# Patient Record
Sex: MALE | Race: BLACK OR AFRICAN AMERICAN | NOT HISPANIC OR LATINO | Employment: FULL TIME | URBAN - METROPOLITAN AREA
[De-identification: names, ages, dates, MRNs, and addresses within clinical notes are randomized per-mention and may not be internally consistent; named-entity substitution may affect disease eponyms.]

---

## 2022-01-10 ENCOUNTER — OFFICE VISIT (OUTPATIENT)
Dept: FAMILY MEDICINE CLINIC | Facility: CLINIC | Age: 37
End: 2022-01-10
Payer: COMMERCIAL

## 2022-01-10 DIAGNOSIS — B34.9 VIRAL SYNDROME: Primary | ICD-10-CM

## 2022-01-10 PROCEDURE — U0003 INFECTIOUS AGENT DETECTION BY NUCLEIC ACID (DNA OR RNA); SEVERE ACUTE RESPIRATORY SYNDROME CORONAVIRUS 2 (SARS-COV-2) (CORONAVIRUS DISEASE [COVID-19]), AMPLIFIED PROBE TECHNIQUE, MAKING USE OF HIGH THROUGHPUT TECHNOLOGIES AS DESCRIBED BY CMS-2020-01-R: HCPCS | Performed by: FAMILY MEDICINE

## 2022-01-10 PROCEDURE — 99203 OFFICE O/P NEW LOW 30 MIN: CPT | Performed by: FAMILY MEDICINE

## 2022-01-10 PROCEDURE — U0005 INFEC AGEN DETEC AMPLI PROBE: HCPCS | Performed by: FAMILY MEDICINE

## 2022-01-10 NOTE — PROGRESS NOTES
Assessment/Plan:  Patient will rest and increase fluids  Patient have COVID testing done at this time  Self quarantine  Note to return to work Saturday       Diagnoses and all orders for this visit:    Viral syndrome            Subjective:        Patient ID: Maura Haines is a 39 y o  male  Patient is here as a new patient to establish care  Past medical history surgical history allergies medications family history and social history all reviewed present time  Patient is here with fever since Wednesday  The fever has improved  Patient with backache /achiness  This is improved  Patient had some diarrhea  This is improved  Patient also had cough  Patient with some URI symptoms  No medications use  Patient did use NyQuil as well as TheraFlu  Patient will need COVID testing for work  The following portions of the patient's history were reviewed and updated as appropriate: allergies, current medications, past family history, past medical history, past social history, past surgical history and problem list       Review of Systems   Constitutional: Negative  HENT: Positive for congestion, postnasal drip, rhinorrhea, sinus pressure and sinus pain  Eyes: Negative  Respiratory: Positive for cough  Cardiovascular: Negative  Gastrointestinal: Negative  Endocrine: Negative  Genitourinary: Negative  Musculoskeletal: Negative  Skin: Negative  Allergic/Immunologic: Negative  Neurological: Negative  Hematological: Negative  Psychiatric/Behavioral: Negative  Objective: There were no vitals taken for this visit  Physical Exam  Vitals and nursing note reviewed  Constitutional:       General: He is not in acute distress  Appearance: Normal appearance  He is not ill-appearing, toxic-appearing or diaphoretic  HENT:      Head: Normocephalic and atraumatic        Right Ear: Tympanic membrane, ear canal and external ear normal  There is no impacted cerumen  Left Ear: Tympanic membrane, ear canal and external ear normal  There is no impacted cerumen  Nose: Rhinorrhea present  No congestion  Mouth/Throat:      Mouth: Mucous membranes are moist       Pharynx: Oropharyngeal exudate present  No posterior oropharyngeal erythema  Eyes:      General: No scleral icterus  Right eye: No discharge  Left eye: No discharge  Extraocular Movements: Extraocular movements intact  Conjunctiva/sclera: Conjunctivae normal       Pupils: Pupils are equal, round, and reactive to light  Neck:      Vascular: No carotid bruit  Cardiovascular:      Rate and Rhythm: Normal rate and regular rhythm  Pulses: Normal pulses  Heart sounds: Normal heart sounds  No murmur heard  No friction rub  No gallop  Pulmonary:      Effort: Pulmonary effort is normal  No respiratory distress  Breath sounds: Normal breath sounds  No stridor  No wheezing, rhonchi or rales  Chest:      Chest wall: No tenderness  Abdominal:      General: Abdomen is flat  Bowel sounds are normal  There is no distension  Palpations: Abdomen is soft  Tenderness: There is no abdominal tenderness  There is no guarding or rebound  Musculoskeletal:         General: No swelling, tenderness, deformity or signs of injury  Normal range of motion  Cervical back: Normal range of motion and neck supple  No rigidity  No muscular tenderness  Right lower leg: No edema  Left lower leg: No edema  Lymphadenopathy:      Cervical: No cervical adenopathy  Skin:     General: Skin is warm and dry  Capillary Refill: Capillary refill takes less than 2 seconds  Coloration: Skin is not jaundiced  Findings: No bruising, erythema, lesion or rash  Neurological:      General: No focal deficit present  Mental Status: He is alert and oriented to person, place, and time  Cranial Nerves: No cranial nerve deficit        Sensory: No sensory deficit  Motor: No weakness  Coordination: Coordination normal       Gait: Gait normal    Psychiatric:         Mood and Affect: Mood normal          Behavior: Behavior normal          Thought Content:  Thought content normal          Judgment: Judgment normal

## 2022-01-10 NOTE — LETTER
January 10, 2022     Patient: Willow Ontiveros   YOB: 1985   Date of Visit: 1/10/2022       To Whom it May Concern:    Willow Ontiveros is under my professional care  He was seen in my office on 1/10/2022  He may return to work on 01/15/2022  If you have any questions or concerns, please don't hesitate to call           Sincerely,          Joseph Marroquin DO        CC: No Recipients

## 2022-01-11 LAB — SARS-COV-2 RNA RESP QL NAA+PROBE: POSITIVE

## 2022-08-15 ENCOUNTER — DOCUMENTATION (OUTPATIENT)
Dept: FAMILY MEDICINE CLINIC | Facility: CLINIC | Age: 37
End: 2022-08-15

## 2022-08-15 ENCOUNTER — OFFICE VISIT (OUTPATIENT)
Dept: FAMILY MEDICINE CLINIC | Facility: CLINIC | Age: 37
End: 2022-08-15
Payer: COMMERCIAL

## 2022-08-15 VITALS
WEIGHT: 131.8 LBS | BODY MASS INDEX: 19.52 KG/M2 | DIASTOLIC BLOOD PRESSURE: 82 MMHG | HEIGHT: 69 IN | TEMPERATURE: 98.9 F | SYSTOLIC BLOOD PRESSURE: 120 MMHG

## 2022-08-15 DIAGNOSIS — H60.332 ACUTE SWIMMER'S EAR OF LEFT SIDE: Primary | ICD-10-CM

## 2022-08-15 DIAGNOSIS — H66.002 NON-RECURRENT ACUTE SUPPURATIVE OTITIS MEDIA OF LEFT EAR WITHOUT SPONTANEOUS RUPTURE OF TYMPANIC MEMBRANE: ICD-10-CM

## 2022-08-15 PROCEDURE — 99213 OFFICE O/P EST LOW 20 MIN: CPT | Performed by: FAMILY MEDICINE

## 2022-08-15 RX ORDER — NEOMYCIN SULFATE, POLYMYXIN B SULFATE AND HYDROCORTISONE 10; 3.5; 1 MG/ML; MG/ML; [USP'U]/ML
4 SUSPENSION/ DROPS AURICULAR (OTIC) 4 TIMES DAILY
Qty: 10 ML | Refills: 0 | Status: SHIPPED | OUTPATIENT
Start: 2022-08-15

## 2022-08-15 RX ORDER — AMOXICILLIN 500 MG/1
1000 CAPSULE ORAL EVERY 12 HOURS SCHEDULED
Qty: 28 CAPSULE | Refills: 0 | Status: SHIPPED | OUTPATIENT
Start: 2022-08-15 | End: 2022-08-22

## 2022-08-15 RX ORDER — IBUPROFEN 600 MG/1
600 TABLET ORAL EVERY 6 HOURS PRN
Qty: 90 TABLET | Refills: 0 | Status: SHIPPED | OUTPATIENT
Start: 2022-08-15

## 2022-08-15 NOTE — PROGRESS NOTES
Assessment/Plan: note for work given at this time  Patient have modified duty for the next week       Diagnoses and all orders for this visit:    Acute swimmer's ear of left side  -     neomycin-polymyxin-hydrocortisone (CORTISPORIN) 0 35%-10,000 units/mL-1% otic suspension; Administer 4 drops into the left ear 4 (four) times a day  -     amoxicillin (AMOXIL) 500 mg capsule; Take 2 capsules (1,000 mg total) by mouth every 12 (twelve) hours for 7 days  -     ibuprofen (MOTRIN) 600 mg tablet; Take 1 tablet (600 mg total) by mouth every 6 (six) hours as needed for mild pain or moderate pain    Non-recurrent acute suppurative otitis media of left ear without spontaneous rupture of tympanic membrane  -     amoxicillin (AMOXIL) 500 mg capsule; Take 2 capsules (1,000 mg total) by mouth every 12 (twelve) hours for 7 days  -     ibuprofen (MOTRIN) 600 mg tablet; Take 1 tablet (600 mg total) by mouth every 6 (six) hours as needed for mild pain or moderate pain            Subjective:        Patient ID: Lisa Mazariegos is a 39 y o  male  Patient is here with left ear swelling and pain over the past 2 days  No fever noted  No other URI symptoms noted with this  Patient is using Motrin with some relief  The following portions of the patient's history were reviewed and updated as appropriate: allergies, current medications, past family history, past medical history, past social history, past surgical history and problem list       Review of Systems   Constitutional: Negative  HENT: Positive for ear pain  Eyes: Negative  Respiratory: Negative  Cardiovascular: Negative  Gastrointestinal: Negative  Endocrine: Negative  Genitourinary: Negative  Musculoskeletal: Negative  Skin: Negative  Allergic/Immunologic: Negative  Neurological: Negative  Hematological: Negative  Psychiatric/Behavioral: Negative              Objective:               /82 (BP Location: Right arm, Patient Position: Sitting, Cuff Size: Standard)   Temp 98 9 °F (37 2 °C) (Temporal)   Ht 5' 9" (1 753 m) Comment: Per patient  Wt 59 8 kg (131 lb 12 8 oz)   BMI 19 46 kg/m²          Physical Exam  Vitals and nursing note reviewed  Constitutional:       General: He is not in acute distress  Appearance: Normal appearance  He is not ill-appearing, toxic-appearing or diaphoretic  HENT:      Head: Normocephalic and atraumatic  Right Ear: Tympanic membrane, ear canal and external ear normal  There is no impacted cerumen  Left Ear: External ear normal  There is no impacted cerumen  Ears:      Comments: Left ear canal with debris /discharge  Nose: Nose normal  No congestion or rhinorrhea  Mouth/Throat:      Mouth: Mucous membranes are moist       Pharynx: No oropharyngeal exudate or posterior oropharyngeal erythema  Eyes:      General: No scleral icterus  Right eye: No discharge  Left eye: No discharge  Extraocular Movements: Extraocular movements intact  Conjunctiva/sclera: Conjunctivae normal       Pupils: Pupils are equal, round, and reactive to light  Neck:      Vascular: No carotid bruit  Cardiovascular:      Rate and Rhythm: Normal rate and regular rhythm  Pulses: Normal pulses  Heart sounds: Normal heart sounds  No murmur heard  No friction rub  No gallop  Pulmonary:      Effort: Pulmonary effort is normal  No respiratory distress  Breath sounds: Normal breath sounds  No stridor  No wheezing, rhonchi or rales  Chest:      Chest wall: No tenderness  Musculoskeletal:         General: No swelling, tenderness, deformity or signs of injury  Normal range of motion  Cervical back: Normal range of motion and neck supple  No rigidity  No muscular tenderness  Right lower leg: No edema  Left lower leg: No edema  Lymphadenopathy:      Cervical: No cervical adenopathy  Skin:     General: Skin is warm and dry        Capillary Refill: Capillary refill takes less than 2 seconds  Coloration: Skin is not jaundiced  Findings: No bruising, erythema, lesion or rash  Neurological:      Mental Status: He is alert and oriented to person, place, and time  Mental status is at baseline  Cranial Nerves: No cranial nerve deficit  Sensory: No sensory deficit  Motor: No weakness  Coordination: Coordination normal       Gait: Gait normal    Psychiatric:         Mood and Affect: Mood normal          Behavior: Behavior normal          Thought Content:  Thought content normal          Judgment: Judgment normal

## 2022-08-26 ENCOUNTER — OFFICE VISIT (OUTPATIENT)
Dept: FAMILY MEDICINE CLINIC | Facility: CLINIC | Age: 37
End: 2022-08-26
Payer: COMMERCIAL

## 2022-08-26 VITALS
TEMPERATURE: 97.1 F | SYSTOLIC BLOOD PRESSURE: 110 MMHG | DIASTOLIC BLOOD PRESSURE: 74 MMHG | HEART RATE: 61 BPM | BODY MASS INDEX: 19.4 KG/M2 | WEIGHT: 131 LBS | HEIGHT: 69 IN | RESPIRATION RATE: 18 BRPM | OXYGEN SATURATION: 98 %

## 2022-08-26 DIAGNOSIS — H60.332 ACUTE SWIMMER'S EAR OF LEFT SIDE: ICD-10-CM

## 2022-08-26 DIAGNOSIS — H66.002 NON-RECURRENT ACUTE SUPPURATIVE OTITIS MEDIA OF LEFT EAR WITHOUT SPONTANEOUS RUPTURE OF TYMPANIC MEMBRANE: Primary | ICD-10-CM

## 2022-08-26 PROCEDURE — 99213 OFFICE O/P EST LOW 20 MIN: CPT | Performed by: FAMILY MEDICINE

## 2022-08-26 RX ORDER — LEVOFLOXACIN 500 MG/1
500 TABLET, FILM COATED ORAL EVERY 24 HOURS
Qty: 7 TABLET | Refills: 0 | Status: SHIPPED | OUTPATIENT
Start: 2022-08-26 | End: 2022-09-02

## 2022-08-26 NOTE — PROGRESS NOTES
Assessment/Plan:  Patient will try to refrain from getting water in ear  Diagnoses and all orders for this visit:    Non-recurrent acute suppurative otitis media of left ear without spontaneous rupture of tympanic membrane  -     levofloxacin (LEVAQUIN) 500 mg tablet; Take 1 tablet (500 mg total) by mouth every 24 hours for 7 days    Acute swimmer's ear of left side  -     levofloxacin (LEVAQUIN) 500 mg tablet; Take 1 tablet (500 mg total) by mouth every 24 hours for 7 days            Subjective:        Patient ID: Albert Merchant is a 39 y o  male  Patient follow-up on left otitis media as well as some reserve  Patient has seen improvement with treatment but has noticed slight recurrence at this time  No fever noted  Scant discharge noted  No URI symptoms otherwise  The following portions of the patient's history were reviewed and updated as appropriate: allergies, current medications, past family history, past medical history, past social history, past surgical history and problem list       Review of Systems   Constitutional: Negative  HENT: Positive for ear pain  Eyes: Negative  Respiratory: Negative  Cardiovascular: Negative  Gastrointestinal: Negative  Endocrine: Negative  Genitourinary: Negative  Musculoskeletal: Negative  Skin: Negative  Allergic/Immunologic: Negative  Neurological: Negative  Hematological: Negative  Psychiatric/Behavioral: Negative  Objective:        Depression Screening and Follow-up Plan: Patient was screened for depression during today's encounter  They screened negative with a PHQ-2 score of 0             /74 (BP Location: Right arm, Patient Position: Sitting, Cuff Size: Adult)   Pulse 61   Temp (!) 97 1 °F (36 2 °C) (Tympanic)   Resp 18   Ht 5' 9" (1 753 m)   Wt 59 4 kg (131 lb)   SpO2 98%   BMI 19 35 kg/m²          Physical Exam  Vitals and nursing note reviewed     Constitutional:       General: He is not in acute distress  Appearance: Normal appearance  He is not ill-appearing, toxic-appearing or diaphoretic  HENT:      Head: Normocephalic and atraumatic  Right Ear: Tympanic membrane, ear canal and external ear normal       Left Ear: Tympanic membrane normal       Ears:      Comments: White discharge left external auditory canal   Neurological:      Mental Status: He is alert

## 2022-10-13 ENCOUNTER — OFFICE VISIT (OUTPATIENT)
Dept: FAMILY MEDICINE CLINIC | Facility: CLINIC | Age: 37
End: 2022-10-13
Payer: COMMERCIAL

## 2022-10-13 VITALS
SYSTOLIC BLOOD PRESSURE: 110 MMHG | HEART RATE: 65 BPM | DIASTOLIC BLOOD PRESSURE: 68 MMHG | BODY MASS INDEX: 19.55 KG/M2 | OXYGEN SATURATION: 98 % | TEMPERATURE: 98.7 F | WEIGHT: 132 LBS | HEIGHT: 69 IN

## 2022-10-13 DIAGNOSIS — Z23 ENCOUNTER FOR IMMUNIZATION: ICD-10-CM

## 2022-10-13 DIAGNOSIS — Z00.00 WELL ADULT EXAM: Primary | ICD-10-CM

## 2022-10-13 PROCEDURE — 90715 TDAP VACCINE 7 YRS/> IM: CPT

## 2022-10-13 PROCEDURE — 99395 PREV VISIT EST AGE 18-39: CPT | Performed by: FAMILY MEDICINE

## 2022-10-13 PROCEDURE — 90471 IMMUNIZATION ADMIN: CPT

## 2022-10-13 NOTE — PROGRESS NOTES
Assessment/Plan:  Adacel shot given at this time  Some other vaccines reviewed  No early family history of colon or prostate cancer  Patient will consider laboratory studies for the future  Follow-up in 1 year  Recommend exercise and appropriate diet well       Diagnoses and all orders for this visit:    Well adult exam            Subjective:        Patient ID: Kaleigh Allred is a 39 y o  male  Patient is here for wellness exam   Labs and vaccines reviewed  No early family history of colon or prostate cancer  Patient feeling fairly well overall  The following portions of the patient's history were reviewed and updated as appropriate: allergies, current medications, past family history, past medical history, past social history, past surgical history and problem list       Review of Systems   Constitutional: Negative  HENT: Negative  Eyes: Negative  Respiratory: Negative  Cardiovascular: Negative  Gastrointestinal: Negative  Endocrine: Negative  Genitourinary: Negative  Musculoskeletal: Negative  Skin: Negative  Allergic/Immunologic: Negative  Neurological: Negative  Hematological: Negative  Psychiatric/Behavioral: Negative  Objective:               /68 (BP Location: Left arm, Patient Position: Sitting, Cuff Size: Standard)   Pulse 65   Temp 98 7 °F (37 1 °C) (Temporal)   Ht 5' 9" (1 753 m)   Wt 59 9 kg (132 lb)   SpO2 98%   BMI 19 49 kg/m²          Physical Exam  Vitals and nursing note reviewed  Constitutional:       General: He is not in acute distress  Appearance: Normal appearance  He is not ill-appearing, toxic-appearing or diaphoretic  HENT:      Head: Normocephalic and atraumatic  Right Ear: Tympanic membrane, ear canal and external ear normal  There is no impacted cerumen  Left Ear: Tympanic membrane, ear canal and external ear normal  There is no impacted cerumen        Nose: Nose normal  No congestion or rhinorrhea  Mouth/Throat:      Mouth: Mucous membranes are moist       Pharynx: No oropharyngeal exudate or posterior oropharyngeal erythema  Eyes:      General: No scleral icterus  Right eye: No discharge  Left eye: No discharge  Extraocular Movements: Extraocular movements intact  Conjunctiva/sclera: Conjunctivae normal       Pupils: Pupils are equal, round, and reactive to light  Neck:      Vascular: No carotid bruit  Cardiovascular:      Rate and Rhythm: Normal rate and regular rhythm  Pulses: Normal pulses  Heart sounds: Normal heart sounds  No murmur heard  No friction rub  No gallop  Pulmonary:      Effort: Pulmonary effort is normal  No respiratory distress  Breath sounds: Normal breath sounds  No stridor  No wheezing, rhonchi or rales  Chest:      Chest wall: No tenderness  Abdominal:      General: Abdomen is flat  Bowel sounds are normal  There is no distension  Palpations: Abdomen is soft  Tenderness: There is no abdominal tenderness  There is no guarding or rebound  Musculoskeletal:         General: No swelling, tenderness, deformity or signs of injury  Normal range of motion  Cervical back: Normal range of motion and neck supple  No rigidity  No muscular tenderness  Right lower leg: No edema  Left lower leg: No edema  Lymphadenopathy:      Cervical: No cervical adenopathy  Skin:     General: Skin is warm and dry  Capillary Refill: Capillary refill takes less than 2 seconds  Coloration: Skin is not jaundiced  Findings: No bruising, erythema, lesion or rash  Neurological:      Mental Status: He is alert and oriented to person, place, and time  Mental status is at baseline  Cranial Nerves: No cranial nerve deficit  Sensory: No sensory deficit  Motor: No weakness        Coordination: Coordination normal       Gait: Gait normal    Psychiatric:         Mood and Affect: Mood normal  Behavior: Behavior normal          Thought Content:  Thought content normal          Judgment: Judgment normal

## 2022-10-14 PROBLEM — H60.332 ACUTE SWIMMER'S EAR OF LEFT SIDE: Status: RESOLVED | Noted: 2022-08-15 | Resolved: 2022-10-14

## 2022-10-14 PROBLEM — H66.002 NON-RECURRENT ACUTE SUPPURATIVE OTITIS MEDIA OF LEFT EAR: Status: RESOLVED | Noted: 2022-08-15 | Resolved: 2022-10-14

## 2022-12-12 PROBLEM — Z00.00 WELL ADULT EXAM: Status: RESOLVED | Noted: 2022-10-13 | Resolved: 2022-12-12

## 2024-03-11 ENCOUNTER — DOCUMENTATION (OUTPATIENT)
Dept: FAMILY MEDICINE CLINIC | Facility: CLINIC | Age: 39
End: 2024-03-11

## 2024-07-18 ENCOUNTER — EVALUATION (OUTPATIENT)
Dept: PHYSICAL THERAPY | Facility: CLINIC | Age: 39
End: 2024-07-18
Payer: COMMERCIAL

## 2024-07-18 VITALS — DIASTOLIC BLOOD PRESSURE: 78 MMHG | SYSTOLIC BLOOD PRESSURE: 116 MMHG

## 2024-07-18 DIAGNOSIS — G89.29 NECK PAIN, CHRONIC: Primary | ICD-10-CM

## 2024-07-18 DIAGNOSIS — G89.29 CHRONIC LEFT-SIDED THORACIC BACK PAIN: ICD-10-CM

## 2024-07-18 DIAGNOSIS — M54.6 CHRONIC LEFT-SIDED THORACIC BACK PAIN: ICD-10-CM

## 2024-07-18 DIAGNOSIS — M54.2 NECK PAIN, CHRONIC: Primary | ICD-10-CM

## 2024-07-18 PROCEDURE — 97161 PT EVAL LOW COMPLEX 20 MIN: CPT

## 2024-07-18 PROCEDURE — 97112 NEUROMUSCULAR REEDUCATION: CPT
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
## 2024-07-22 ENCOUNTER — OFFICE VISIT (OUTPATIENT)
Dept: PHYSICAL THERAPY | Facility: CLINIC | Age: 39
End: 2024-07-22
Payer: COMMERCIAL

## 2024-07-22 DIAGNOSIS — G89.29 CHRONIC LEFT-SIDED THORACIC BACK PAIN: ICD-10-CM

## 2024-07-22 DIAGNOSIS — M54.2 NECK PAIN, CHRONIC: Primary | ICD-10-CM

## 2024-07-22 DIAGNOSIS — G89.29 NECK PAIN, CHRONIC: Primary | ICD-10-CM

## 2024-07-22 DIAGNOSIS — M54.6 CHRONIC LEFT-SIDED THORACIC BACK PAIN: ICD-10-CM

## 2024-07-22 PROCEDURE — 97110 THERAPEUTIC EXERCISES: CPT

## 2024-07-22 PROCEDURE — 97112 NEUROMUSCULAR REEDUCATION: CPT
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
## 2024-07-25 ENCOUNTER — OFFICE VISIT (OUTPATIENT)
Dept: PHYSICAL THERAPY | Facility: CLINIC | Age: 39
End: 2024-07-25
Payer: COMMERCIAL

## 2024-07-25 DIAGNOSIS — M54.6 CHRONIC LEFT-SIDED THORACIC BACK PAIN: ICD-10-CM

## 2024-07-25 DIAGNOSIS — M54.2 NECK PAIN, CHRONIC: Primary | ICD-10-CM

## 2024-07-25 DIAGNOSIS — G89.29 CHRONIC LEFT-SIDED THORACIC BACK PAIN: ICD-10-CM

## 2024-07-25 DIAGNOSIS — G89.29 NECK PAIN, CHRONIC: Primary | ICD-10-CM

## 2024-07-25 PROCEDURE — 97110 THERAPEUTIC EXERCISES: CPT

## 2024-07-25 PROCEDURE — 97112 NEUROMUSCULAR REEDUCATION: CPT
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
## 2024-07-29 ENCOUNTER — OFFICE VISIT (OUTPATIENT)
Dept: PHYSICAL THERAPY | Facility: CLINIC | Age: 39
End: 2024-07-29
Payer: COMMERCIAL

## 2024-07-29 DIAGNOSIS — G89.29 CHRONIC LEFT-SIDED THORACIC BACK PAIN: ICD-10-CM

## 2024-07-29 DIAGNOSIS — M54.6 CHRONIC LEFT-SIDED THORACIC BACK PAIN: ICD-10-CM

## 2024-07-29 DIAGNOSIS — M54.2 NECK PAIN, CHRONIC: Primary | ICD-10-CM

## 2024-07-29 DIAGNOSIS — G89.29 NECK PAIN, CHRONIC: Primary | ICD-10-CM

## 2024-07-29 PROCEDURE — 97112 NEUROMUSCULAR REEDUCATION: CPT

## 2024-07-29 PROCEDURE — 97110 THERAPEUTIC EXERCISES: CPT
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
## 2024-08-02 ENCOUNTER — OFFICE VISIT (OUTPATIENT)
Dept: PHYSICAL THERAPY | Facility: CLINIC | Age: 39
End: 2024-08-02
Payer: COMMERCIAL

## 2024-08-02 DIAGNOSIS — M54.6 CHRONIC LEFT-SIDED THORACIC BACK PAIN: ICD-10-CM

## 2024-08-02 DIAGNOSIS — G89.29 NECK PAIN, CHRONIC: Primary | ICD-10-CM

## 2024-08-02 DIAGNOSIS — M54.2 NECK PAIN, CHRONIC: Primary | ICD-10-CM

## 2024-08-02 DIAGNOSIS — G89.29 CHRONIC LEFT-SIDED THORACIC BACK PAIN: ICD-10-CM

## 2024-08-02 PROCEDURE — 97112 NEUROMUSCULAR REEDUCATION: CPT

## 2024-08-02 PROCEDURE — 97110 THERAPEUTIC EXERCISES: CPT

## 2024-08-02 PROCEDURE — 97530 THERAPEUTIC ACTIVITIES: CPT
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
## 2024-08-05 ENCOUNTER — OFFICE VISIT (OUTPATIENT)
Dept: PHYSICAL THERAPY | Facility: CLINIC | Age: 39
End: 2024-08-05
Payer: COMMERCIAL

## 2024-08-05 DIAGNOSIS — M54.6 CHRONIC LEFT-SIDED THORACIC BACK PAIN: ICD-10-CM

## 2024-08-05 DIAGNOSIS — G89.29 CHRONIC LEFT-SIDED THORACIC BACK PAIN: ICD-10-CM

## 2024-08-05 DIAGNOSIS — M54.2 NECK PAIN, CHRONIC: Primary | ICD-10-CM

## 2024-08-05 DIAGNOSIS — G89.29 NECK PAIN, CHRONIC: Primary | ICD-10-CM

## 2024-08-05 PROCEDURE — 97110 THERAPEUTIC EXERCISES: CPT
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
## 2024-08-09 ENCOUNTER — OFFICE VISIT (OUTPATIENT)
Dept: PHYSICAL THERAPY | Facility: CLINIC | Age: 39
End: 2024-08-09
Payer: COMMERCIAL

## 2024-08-09 DIAGNOSIS — M54.2 NECK PAIN, CHRONIC: Primary | ICD-10-CM

## 2024-08-09 DIAGNOSIS — G89.29 NECK PAIN, CHRONIC: Primary | ICD-10-CM

## 2024-08-09 DIAGNOSIS — M54.6 CHRONIC LEFT-SIDED THORACIC BACK PAIN: ICD-10-CM

## 2024-08-09 DIAGNOSIS — G89.29 CHRONIC LEFT-SIDED THORACIC BACK PAIN: ICD-10-CM

## 2024-08-09 PROCEDURE — 97112 NEUROMUSCULAR REEDUCATION: CPT

## 2024-08-09 PROCEDURE — 97535 SELF CARE MNGMENT TRAINING: CPT

## 2024-08-09 PROCEDURE — 97110 THERAPEUTIC EXERCISES: CPT
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
## 2024-08-12 ENCOUNTER — OFFICE VISIT (OUTPATIENT)
Dept: PHYSICAL THERAPY | Facility: CLINIC | Age: 39
End: 2024-08-12
Payer: COMMERCIAL

## 2024-08-12 DIAGNOSIS — G89.29 NECK PAIN, CHRONIC: Primary | ICD-10-CM

## 2024-08-12 DIAGNOSIS — M54.2 NECK PAIN, CHRONIC: Primary | ICD-10-CM

## 2024-08-12 DIAGNOSIS — G89.29 CHRONIC LEFT-SIDED THORACIC BACK PAIN: ICD-10-CM

## 2024-08-12 DIAGNOSIS — M54.6 CHRONIC LEFT-SIDED THORACIC BACK PAIN: ICD-10-CM

## 2024-08-12 PROCEDURE — 97535 SELF CARE MNGMENT TRAINING: CPT

## 2024-08-12 PROCEDURE — 97112 NEUROMUSCULAR REEDUCATION: CPT

## 2024-08-12 PROCEDURE — 97110 THERAPEUTIC EXERCISES: CPT
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
## 2024-08-16 ENCOUNTER — OFFICE VISIT (OUTPATIENT)
Dept: PHYSICAL THERAPY | Facility: CLINIC | Age: 39
End: 2024-08-16
Payer: COMMERCIAL

## 2024-08-16 DIAGNOSIS — G89.29 NECK PAIN, CHRONIC: Primary | ICD-10-CM

## 2024-08-16 DIAGNOSIS — M54.6 CHRONIC LEFT-SIDED THORACIC BACK PAIN: ICD-10-CM

## 2024-08-16 DIAGNOSIS — M54.2 NECK PAIN, CHRONIC: Primary | ICD-10-CM

## 2024-08-16 DIAGNOSIS — G89.29 CHRONIC LEFT-SIDED THORACIC BACK PAIN: ICD-10-CM

## 2024-08-16 PROCEDURE — 97535 SELF CARE MNGMENT TRAINING: CPT

## 2024-08-16 PROCEDURE — 97112 NEUROMUSCULAR REEDUCATION: CPT

## 2024-08-16 PROCEDURE — 97110 THERAPEUTIC EXERCISES: CPT

## 2024-08-23 ENCOUNTER — OFFICE VISIT (OUTPATIENT)
Dept: PHYSICAL THERAPY | Facility: CLINIC | Age: 39
End: 2024-08-23
Payer: COMMERCIAL

## 2024-08-23 DIAGNOSIS — G89.29 CHRONIC LEFT-SIDED THORACIC BACK PAIN: ICD-10-CM

## 2024-08-23 DIAGNOSIS — M54.6 CHRONIC LEFT-SIDED THORACIC BACK PAIN: ICD-10-CM

## 2024-08-23 DIAGNOSIS — M54.2 NECK PAIN, CHRONIC: Primary | ICD-10-CM

## 2024-08-23 DIAGNOSIS — G89.29 NECK PAIN, CHRONIC: Primary | ICD-10-CM

## 2024-08-23 PROCEDURE — 97112 NEUROMUSCULAR REEDUCATION: CPT

## 2024-08-23 PROCEDURE — 97110 THERAPEUTIC EXERCISES: CPT
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
## 2024-08-30 ENCOUNTER — OFFICE VISIT (OUTPATIENT)
Dept: PHYSICAL THERAPY | Facility: CLINIC | Age: 39
End: 2024-08-30
Payer: COMMERCIAL

## 2024-08-30 DIAGNOSIS — M54.2 NECK PAIN, CHRONIC: Primary | ICD-10-CM

## 2024-08-30 DIAGNOSIS — G89.29 NECK PAIN, CHRONIC: Primary | ICD-10-CM

## 2024-08-30 DIAGNOSIS — G89.29 CHRONIC LEFT-SIDED THORACIC BACK PAIN: ICD-10-CM

## 2024-08-30 DIAGNOSIS — M54.6 CHRONIC LEFT-SIDED THORACIC BACK PAIN: ICD-10-CM

## 2024-08-30 PROCEDURE — 97112 NEUROMUSCULAR REEDUCATION: CPT

## 2024-08-30 PROCEDURE — 97110 THERAPEUTIC EXERCISES: CPT

## 2024-08-30 PROCEDURE — 97140 MANUAL THERAPY 1/> REGIONS: CPT
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
## 2024-09-04 ENCOUNTER — OFFICE VISIT (OUTPATIENT)
Dept: PHYSICAL THERAPY | Facility: CLINIC | Age: 39
End: 2024-09-04
Payer: COMMERCIAL

## 2024-09-04 DIAGNOSIS — M54.2 NECK PAIN, CHRONIC: Primary | ICD-10-CM

## 2024-09-04 DIAGNOSIS — M54.6 CHRONIC LEFT-SIDED THORACIC BACK PAIN: ICD-10-CM

## 2024-09-04 DIAGNOSIS — G89.29 CHRONIC LEFT-SIDED THORACIC BACK PAIN: ICD-10-CM

## 2024-09-04 DIAGNOSIS — G89.29 NECK PAIN, CHRONIC: Primary | ICD-10-CM

## 2024-09-04 PROCEDURE — 97140 MANUAL THERAPY 1/> REGIONS: CPT

## 2024-09-04 PROCEDURE — 97112 NEUROMUSCULAR REEDUCATION: CPT

## 2024-09-04 PROCEDURE — 97110 THERAPEUTIC EXERCISES: CPT
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
## 2024-09-06 ENCOUNTER — OFFICE VISIT (OUTPATIENT)
Dept: PHYSICAL THERAPY | Facility: CLINIC | Age: 39
End: 2024-09-06
Payer: COMMERCIAL

## 2024-09-06 DIAGNOSIS — M54.6 CHRONIC LEFT-SIDED THORACIC BACK PAIN: ICD-10-CM

## 2024-09-06 DIAGNOSIS — G89.29 NECK PAIN, CHRONIC: Primary | ICD-10-CM

## 2024-09-06 DIAGNOSIS — G89.29 CHRONIC LEFT-SIDED THORACIC BACK PAIN: ICD-10-CM

## 2024-09-06 DIAGNOSIS — M54.2 NECK PAIN, CHRONIC: Primary | ICD-10-CM

## 2024-09-06 PROCEDURE — 97110 THERAPEUTIC EXERCISES: CPT

## 2024-09-06 PROCEDURE — 97140 MANUAL THERAPY 1/> REGIONS: CPT
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
## 2024-09-09 ENCOUNTER — APPOINTMENT (OUTPATIENT)
Dept: PHYSICAL THERAPY | Facility: CLINIC | Age: 39
End: 2024-09-09
Payer: COMMERCIAL
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
## 2024-09-13 ENCOUNTER — OFFICE VISIT (OUTPATIENT)
Dept: PHYSICAL THERAPY | Facility: CLINIC | Age: 39
End: 2024-09-13
Payer: COMMERCIAL

## 2024-09-13 DIAGNOSIS — G89.29 CHRONIC LEFT-SIDED THORACIC BACK PAIN: ICD-10-CM

## 2024-09-13 DIAGNOSIS — G89.29 NECK PAIN, CHRONIC: Primary | ICD-10-CM

## 2024-09-13 DIAGNOSIS — M54.6 CHRONIC LEFT-SIDED THORACIC BACK PAIN: ICD-10-CM

## 2024-09-13 DIAGNOSIS — M54.2 NECK PAIN, CHRONIC: Primary | ICD-10-CM

## 2024-09-13 PROCEDURE — 97140 MANUAL THERAPY 1/> REGIONS: CPT

## 2024-09-13 PROCEDURE — 97110 THERAPEUTIC EXERCISES: CPT

## 2024-09-13 PROCEDURE — 97164 PT RE-EVAL EST PLAN CARE: CPT | Performed by: PHYSICAL THERAPIST
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
## 2024-09-16 ENCOUNTER — APPOINTMENT (OUTPATIENT)
Dept: PHYSICAL THERAPY | Facility: CLINIC | Age: 39
End: 2024-09-16
Payer: COMMERCIAL
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
## 2024-09-20 ENCOUNTER — OFFICE VISIT (OUTPATIENT)
Dept: PHYSICAL THERAPY | Facility: CLINIC | Age: 39
End: 2024-09-20
Payer: COMMERCIAL

## 2024-09-20 DIAGNOSIS — M54.2 NECK PAIN, CHRONIC: Primary | ICD-10-CM

## 2024-09-20 DIAGNOSIS — G89.29 CHRONIC LEFT-SIDED THORACIC BACK PAIN: ICD-10-CM

## 2024-09-20 DIAGNOSIS — G89.29 NECK PAIN, CHRONIC: Primary | ICD-10-CM

## 2024-09-20 DIAGNOSIS — M54.6 CHRONIC LEFT-SIDED THORACIC BACK PAIN: ICD-10-CM

## 2024-09-20 PROCEDURE — 97140 MANUAL THERAPY 1/> REGIONS: CPT

## 2024-09-20 PROCEDURE — 97112 NEUROMUSCULAR REEDUCATION: CPT

## 2024-09-20 PROCEDURE — 97110 THERAPEUTIC EXERCISES: CPT
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
## 2024-09-25 ENCOUNTER — OFFICE VISIT (OUTPATIENT)
Dept: PHYSICAL THERAPY | Facility: CLINIC | Age: 39
End: 2024-09-25
Payer: COMMERCIAL

## 2024-09-25 DIAGNOSIS — M54.6 CHRONIC LEFT-SIDED THORACIC BACK PAIN: ICD-10-CM

## 2024-09-25 DIAGNOSIS — M54.2 NECK PAIN, CHRONIC: Primary | ICD-10-CM

## 2024-09-25 DIAGNOSIS — G89.29 CHRONIC LEFT-SIDED THORACIC BACK PAIN: ICD-10-CM

## 2024-09-25 DIAGNOSIS — G89.29 NECK PAIN, CHRONIC: Primary | ICD-10-CM

## 2024-09-25 PROCEDURE — 97110 THERAPEUTIC EXERCISES: CPT

## 2024-09-25 PROCEDURE — 97112 NEUROMUSCULAR REEDUCATION: CPT
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
## 2024-09-27 ENCOUNTER — OFFICE VISIT (OUTPATIENT)
Dept: PHYSICAL THERAPY | Facility: CLINIC | Age: 39
End: 2024-09-27
Payer: COMMERCIAL

## 2024-09-27 DIAGNOSIS — G89.29 NECK PAIN, CHRONIC: Primary | ICD-10-CM

## 2024-09-27 DIAGNOSIS — M54.6 CHRONIC LEFT-SIDED THORACIC BACK PAIN: ICD-10-CM

## 2024-09-27 DIAGNOSIS — M54.2 NECK PAIN, CHRONIC: Primary | ICD-10-CM

## 2024-09-27 DIAGNOSIS — G89.29 CHRONIC LEFT-SIDED THORACIC BACK PAIN: ICD-10-CM

## 2024-09-27 PROCEDURE — 97110 THERAPEUTIC EXERCISES: CPT

## 2024-09-27 PROCEDURE — 97112 NEUROMUSCULAR REEDUCATION: CPT

## 2024-09-27 PROCEDURE — 97140 MANUAL THERAPY 1/> REGIONS: CPT
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
## 2024-09-30 ENCOUNTER — OFFICE VISIT (OUTPATIENT)
Dept: PHYSICAL THERAPY | Facility: CLINIC | Age: 39
End: 2024-09-30
Payer: COMMERCIAL

## 2024-09-30 DIAGNOSIS — G89.29 NECK PAIN, CHRONIC: Primary | ICD-10-CM

## 2024-09-30 DIAGNOSIS — M54.6 CHRONIC LEFT-SIDED THORACIC BACK PAIN: ICD-10-CM

## 2024-09-30 DIAGNOSIS — G89.29 CHRONIC LEFT-SIDED THORACIC BACK PAIN: ICD-10-CM

## 2024-09-30 DIAGNOSIS — M54.2 NECK PAIN, CHRONIC: Primary | ICD-10-CM

## 2024-09-30 PROCEDURE — 97140 MANUAL THERAPY 1/> REGIONS: CPT

## 2024-09-30 PROCEDURE — 97110 THERAPEUTIC EXERCISES: CPT

## 2024-09-30 PROCEDURE — 97112 NEUROMUSCULAR REEDUCATION: CPT
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
## 2024-10-04 ENCOUNTER — OFFICE VISIT (OUTPATIENT)
Dept: PHYSICAL THERAPY | Facility: CLINIC | Age: 39
End: 2024-10-04
Payer: COMMERCIAL

## 2024-10-04 DIAGNOSIS — M54.2 NECK PAIN, CHRONIC: Primary | ICD-10-CM

## 2024-10-04 DIAGNOSIS — G89.29 NECK PAIN, CHRONIC: Primary | ICD-10-CM

## 2024-10-04 DIAGNOSIS — M54.6 CHRONIC LEFT-SIDED THORACIC BACK PAIN: ICD-10-CM

## 2024-10-04 DIAGNOSIS — G89.29 CHRONIC LEFT-SIDED THORACIC BACK PAIN: ICD-10-CM

## 2024-10-04 PROCEDURE — 97112 NEUROMUSCULAR REEDUCATION: CPT

## 2024-10-04 PROCEDURE — 97140 MANUAL THERAPY 1/> REGIONS: CPT

## 2024-10-04 PROCEDURE — 97010 HOT OR COLD PACKS THERAPY: CPT

## 2024-10-04 PROCEDURE — 97110 THERAPEUTIC EXERCISES: CPT
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
## 2024-10-07 ENCOUNTER — OFFICE VISIT (OUTPATIENT)
Dept: PHYSICAL THERAPY | Facility: CLINIC | Age: 39
End: 2024-10-07
Payer: COMMERCIAL

## 2024-10-07 DIAGNOSIS — G89.29 NECK PAIN, CHRONIC: Primary | ICD-10-CM

## 2024-10-07 DIAGNOSIS — M54.2 NECK PAIN, CHRONIC: Primary | ICD-10-CM

## 2024-10-07 DIAGNOSIS — G89.29 CHRONIC LEFT-SIDED THORACIC BACK PAIN: ICD-10-CM

## 2024-10-07 DIAGNOSIS — M54.6 CHRONIC LEFT-SIDED THORACIC BACK PAIN: ICD-10-CM

## 2024-10-07 PROCEDURE — 97112 NEUROMUSCULAR REEDUCATION: CPT

## 2024-10-07 PROCEDURE — 97110 THERAPEUTIC EXERCISES: CPT

## 2024-10-07 PROCEDURE — 97530 THERAPEUTIC ACTIVITIES: CPT
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
## 2024-10-11 ENCOUNTER — APPOINTMENT (OUTPATIENT)
Dept: PHYSICAL THERAPY | Facility: CLINIC | Age: 39
End: 2024-10-11
Payer: COMMERCIAL

## 2024-10-14 ENCOUNTER — APPOINTMENT (OUTPATIENT)
Dept: PHYSICAL THERAPY | Facility: CLINIC | Age: 39
End: 2024-10-14
Payer: COMMERCIAL

## 2024-10-14 NOTE — PROGRESS NOTES
PT Re-Evaluation     Today's date: 2024  Patient name: Elvira Grant  : 1985  MRN: [id number]  Referring provider: Koki Conrad,*  Dx:   Encounter Diagnosis     ICD-10-CM    1. Neck pain, chronic  M54.2     G89.29       2. Chronic left-sided thoracic back pain  M54.6     G89.29           Assessment  Assessment details:  Elvira has attended 2 months of PT for neck pain adding in vestibular dx as per physician. Tolerated session without adverse effects. Recommend continued skilled therapy to improve overall strength and mobility for functional return with decreased compensation and pain.       Elvira has attended 14 visits of physical therapy and demonstrates improvements in cervical ROM and UE strength leading to improved function with lifting and carrying, but continues to have high pain levels as well as scapular dyskinesis with hypertonicity in surrounding scapular muscles, as well as thoracic hypomobility continuing to affect his function with lifting, working, sleeping, exercising, prolonged sitting or standing, and reaching/lifting overhead.  Pt will benefit from continued skilled care to meet set goals and address functional impairments.       Goals  Pt will demonstrate Loving with HEP to promote PT carry over and improve ability to manage symptoms independently once Discharged - met  Pt will demonstrate 20% improvement in FOTO score to increase ease with all ADLs and functional activities per PLOF.   Pt will demonstrate 10 deg improvement in cervical AROM to increase tolerance to turning head when driving, and looking down when reading. - met  Pt will demonstrate 4+/5 in bilateral Ue/periscap strength to increase tolerance to lifting overhead and improve prolonged sitting   Pt will demonstrate ability to perform repeated overhead lifting at least 5Ibs without pain and no signs of compensation, or poor scapular movement to improve ease with putting away items in overhead shelves  at work with min to no difficulty.   Pt will demonstrate completing proper pushing motion to improve tolerance to pushing boxes, opening doors etc. Without discomfort to improve tolerance to work activities.     Plan  Patient would benefit from: PT eval and skilled physical therapy    Frequency: 2x week  Plan of Care beginning date: 10/14/2024  Plan of Care expiration date: 2024  Treatment plan discussed with: patient        Subjective Evaluation    History of Present Illness  10/14/24:      : Pt reports improved function, but increased pain and symptoms with continued numbness and tingling in his left arm.  He continues to have some difficulty sleeping.  His neck motion is improving but he still has pain turning.  He continues at work with some pain in his shoulder lifting objects and totes 5-15lbs.      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   Hypotonic in the levator scapulae.   Tenderness of the cervical paraspinals, levator scapulae, lower trapezius, middle trapezius and upper trapezius.   Trigger point to cervical paraspinals, levator scapulae, lower trapezius, middle trapezius and upper trapezius.     Right   Hypertonic in the serratus anterior.   Tenderness of the levator scapulae, serratus anterior and teres minor.   Trigger point to levator scapulae,  teres minor and upper trapezius.     Tenderness     Left Shoulder   No tenderness     Right Shoulder  Tenderness in the lateral scapula and medial scapula and subscapularis. No tenderness in the acromion, biceps tendon (proximal) and bicipital groove.       Active Range of Motion   Cervical  Subcranial protraction:  WFL   Subcranial retraction: minimal  Flexion:  WFL w pain  Extension:  WFL w pain  Left lateral flexion:  WFL with pain  Right lateral flexion:  WFL   Left rotation:  with pain Restriction level: minimal  Right rotation:  with pain Restriction level: minimal  Left Shoulder   Normal active range of motion    Right Shoulder   Normal active range of motion    Additional Active Range of Motion Details  Extension compensation excessive upper cervical extension , lower cervical extension     Scapular Mobility     Right Shoulder   Scapular Dyskinesis: grade I  Scapular mobility: good  Scapular Mobility with Shoulder to 90° FF   Scapular winging: minimal  Scapular elevation: minimal  Upward rotation: premature  Downward rotation: excessive    Scapular Mobility beyond 90° FF   Scapular winging: minimal  Upward rotation: premature  Downward rotation: excessive    Joint Play   Joints within functional limits: C4, C5, C6, C7 and T1     Hypomobile: C1, C2 and C3, T2-T7    Strength/Myotome Testing   Cervical Spine     Left   Interossei strength (t1): 4+    Right   Interossei strength (t1): 4+    Left Shoulder     Planes of Motion   Flexion: 5  Abduction: 5   External rotation at 0°: 4  Internal rotation at 0°: 4     Isolated Muscles   Lower trapezius: 4   Middle trapezius: 4   Serratus anterior: 4    Right Shoulder     Planes of Motion   Flexion: 5   Abduction: 5   External rotation at 0°: 5   Internal rotation at 0°: 5     Isolated Muscles   Lower trapezius: 4+   Middle trapezius: 4+   Serratus anterior: 4+     Left Elbow   Flexion: 5  Extension: 5    Right Elbow   Flexion: 5  Extension: 5    Left Wrist/Hand   Wrist  "extension: 4+  Thumb extension: 4+    Right Wrist/Hand   Wrist extension: 4+  Thumb extension: 4+    Tests   Cervical   Negative Lhermitte's sign, alar ligament test, Sharp-Shane test, transverse ligament test, VBI, craniocervical flexion test  and neck flexor muscle endurance test.     Left   Negative Spurling's Test A, Spurling's Test B and cervical flexion-rotation test.     Right   Negative Spurling's Test A, Spurling's Test B and cervical flexion-rotation test.     Left Shoulder   Negative ULTT1.     Right Shoulder   Positive Hawkin's, painful arc, wall push, scapular relocation and scapular assistance .   Negative ULTT1.   Neuro Exam:     Headaches   Patient reports headaches: No.   Graphical documentation:      and       Flowsheet Rows      Flowsheet Row Most Recent Value   PT/OT G-Codes    Current Score 59   Projected Score 73           Date 8/30 9/4 9/6 9/13 9/20 9/25 9/27 9/30 10/4 10/7   Visit # 11 12 13 14 15 16 17 18 19 20   FOTO       X               Re-eval       SF                     Focus on NM control of Left scapula and posterior cervical musculature.      Manuals  9/4 9/6 9/13 9/20 9/26 9/27 9/30 10/4 10/7   T/S Mobe    PWK J-scoop SF PWK   PWK         UT/LS Str            PWK         Scapular Mob    PWK SF PWK   PWK HY PWK     R Shldr PROM and STM      SF PWK             CS gentle traction  PK PWK   PWK             SOR  PK PWK       PWK HY PWK     T spine STM    PWK   PWK   PWK HY PWK     Neuro Re-Ed  9/4 9/6 9/13 9/20 9/25 9/27 9/30 10/4 10/7   Push up plus                  Scap wall push ups  x 20    Serratus press   4# 30x punches supine         30x 2# punches 30x 2# punches       Doorway Pec Str            3x30\" 3x30\" 3x30\"      Openbooks        10x3\" ea 10x3\"            SNAG ext                      SNAG rot                       UT/LS Str                      Back         3' 3'           Lax Ball Rollout          3'           Deep Neck flex supine      NV 20x5\" 20x5\"  20x5\"  20x5\" " " 15x5\"      Prone neck ext   np                   Pain/postural ucation, dx edu                  KR   Ther Ex  9/4 9/6 9/13 9/20 9/25 9/27 9/30 10/4 10/7   UBE  3'/3' L=5 3'/3' L=5 3'/3' L=5 3'/3' 3'/3' 3'/3' 3'/3' 3'/3' 3'/3'    Foam roll protocol   np                   No moneys                  GTB x 20    T/S ext, rot  10x5\" seated ea 10x5\" seated ea             Pball stretch 10x10\" / thoracic rot stretch 5x10    Tband rows, ext  Purple band 3\"   x 30 ea Black TB 30x 3\" ea     30x 15# ea 20x 15# ea 20x 15# ea 20x 15# ea 15# x30 ea   B/l ER            20x 5# 20x 5# 20x 5#     B/l IR            20x 5# 20x 5# 20x 5#     D2 Flexion                      90/90 ER      2# 15x 20x 2# 20x            Quad Ts, Ys, Ws as able                      Prone I, T, Y  1# 15x 2ea c stablizatoin to decrease Lshrug  1# 15x ea c stabilizatiom to decrease Lshrugh T only 2# 15x ea c stabilizatiom to decrease Lshrugh T only 2# 15x ea c stabilizatiom to decrease Lshrugh T only   10x all directions 2# on SB 2x10 all directions 2# on SB 10x all directions 2# on SB     Repeated flex, scap with scap assist                      Shoulder SHurgs          20x 3\" 2# 20x 3\" 2# 20x 3\" 2# 20x 3\" 2#     Scap retraction          20x3\" 2# 20x3\" 2# 20x3\" 2# 20x3\" 2#     Ther Activity  9/4   9/13       9/30 10/4 10/7                                                 Gait Training  9/4   9/13       9/30 10/4 10/7                                                 Modalities  9/4   9/13       9/30 10/4 10/7   [facility]                8'                                    "

## 2024-10-18 ENCOUNTER — EVALUATION (OUTPATIENT)
Dept: PHYSICAL THERAPY | Facility: CLINIC | Age: 39
End: 2024-10-18
Payer: COMMERCIAL

## 2024-10-18 DIAGNOSIS — G89.29 NECK PAIN, CHRONIC: Primary | ICD-10-CM

## 2024-10-18 DIAGNOSIS — M54.6 CHRONIC LEFT-SIDED THORACIC BACK PAIN: ICD-10-CM

## 2024-10-18 DIAGNOSIS — M54.2 NECK PAIN, CHRONIC: Primary | ICD-10-CM

## 2024-10-18 DIAGNOSIS — G89.29 CHRONIC LEFT-SIDED THORACIC BACK PAIN: ICD-10-CM

## 2024-10-18 PROCEDURE — 97164 PT RE-EVAL EST PLAN CARE: CPT

## 2024-10-18 PROCEDURE — 97140 MANUAL THERAPY 1/> REGIONS: CPT

## 2024-10-18 PROCEDURE — 97112 NEUROMUSCULAR REEDUCATION: CPT
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
## 2024-10-22 ENCOUNTER — EVALUATION (OUTPATIENT)
Dept: PHYSICAL THERAPY | Facility: CLINIC | Age: 39
End: 2024-10-22
Payer: COMMERCIAL

## 2024-10-22 DIAGNOSIS — H83.2X3 VESTIBULAR HYPOFUNCTION OF BOTH EARS: Primary | ICD-10-CM

## 2024-10-22 PROCEDURE — 97140 MANUAL THERAPY 1/> REGIONS: CPT

## 2024-10-22 PROCEDURE — 97112 NEUROMUSCULAR REEDUCATION: CPT

## 2024-10-22 PROCEDURE — 97162 PT EVAL MOD COMPLEX 30 MIN: CPT
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
## 2024-10-28 ENCOUNTER — OFFICE VISIT (OUTPATIENT)
Dept: PHYSICAL THERAPY | Facility: CLINIC | Age: 39
End: 2024-10-28
Payer: COMMERCIAL

## 2024-10-28 DIAGNOSIS — H83.2X3 VESTIBULAR HYPOFUNCTION OF BOTH EARS: Primary | ICD-10-CM

## 2024-10-28 DIAGNOSIS — M54.6 CHRONIC LEFT-SIDED THORACIC BACK PAIN: ICD-10-CM

## 2024-10-28 DIAGNOSIS — G89.29 CHRONIC LEFT-SIDED THORACIC BACK PAIN: ICD-10-CM

## 2024-10-28 DIAGNOSIS — G89.29 NECK PAIN, CHRONIC: ICD-10-CM

## 2024-10-28 DIAGNOSIS — M54.2 NECK PAIN, CHRONIC: ICD-10-CM

## 2024-10-28 PROCEDURE — 97530 THERAPEUTIC ACTIVITIES: CPT

## 2024-10-28 PROCEDURE — 97140 MANUAL THERAPY 1/> REGIONS: CPT

## 2024-10-28 PROCEDURE — 97112 NEUROMUSCULAR REEDUCATION: CPT

## 2024-10-28 PROCEDURE — 97110 THERAPEUTIC EXERCISES: CPT

## 2024-10-28 NOTE — PROGRESS NOTES
"Daily Note     Today's date: 10/28/2024  Patient name: Elvira Grant  : 1985  MRN: [id number]  Referring provider: Enrique Colmenares MD  Dx:   Encounter Diagnosis     ICD-10-CM    1. Vestibular hypofunction of both ears  H83.2X3       2. Neck pain, chronic  M54.2     G89.29       3. Chronic left-sided thoracic back pain  M54.6     G89.29                      Subjective: \" I got an injection on Friday and my neck is feeling better- I haven't noticed much of a difference with the vestibular exercises- all my pain is concentrated in that shoulder blade area\"       Objective: See treatment diary below      Assessment: Elvira presents to therapy with neck/ shoulder and vestibular dx. Added MFD to the L periscap region noting improved mobility and reduced pain post release. Worked on VOR exercises noting continued presence of hypofunction- eyes fatigued post. Encouraged to regularly perform at home.  Symptoms increased with horizontal movement as compared to vertical movement.  Introduced balance exercises today noting increased difficulty maintaining balance with foam and head mobility. Tolerated session without adverse effects. Recommend continued skilled therapy to improve overall strength and mobility for functional return with decreased compensation and pain.        Plan: Continue per plan of care.      Precautions:         Date 10/18 10/22 10/28  9/20 9/25 9/27 9/30 10/4 10/7   Visit # 21 22 23  15 16 17 18 19 20   FOTO XXX            Re-eval XXX Eval               Focus on NM control of Left scapula and posterior cervical musculature.     Manuals 10/18 10/22 10/28  9/20 9/26 9/27 9/30 10/4 10/7   T/S Mobe     PWK  PWK      UT/LS Str PWK      PWK      Scapular Mob PWK    PWK  PWK HY PWK    R Shldr PROM and STM     PWK        Cupping    Scap on L           CS gentle traction     PWK        SOR PWK      PWK HY PWK    T spine STM PWK    PWK  PWK HY PWK    Vestibular Testing  PWK carole-hallpike, BBQ roll       " "    Neuro Re-Ed 10/18 10/22 10/28  9/20 9/25 9/27 9/30 10/4 10/7   Pt Education  PWK           VOR 1 horizontal  2x30\" 2x 15          VOR 1 Vertical  2x30\" 2x15           VOR 2 Horizontal   L eye pressure/ fatigue x 15          VOR 2 Vertical             SLS    W/rockerboard           Tandem Stance   EC foam 3 x 20\"           Balance Beam   Tandem walking on foam w/ HT HNx 5 laps           Biodex   nv          Thoracic ext    5\" x 10 over foam roll        Scap wall push ups  x 20    Serratus press        30x 2# punches 30x 2# punches      Doorway Pec Str       3x30\" 3x30\" 3x30\"     Openbooks     10x3\" ea 10x3\"        SNAG ext             SNAG rot              UT/LS Str             Back      3' 3'       Lax Ball Rollout      3'       Deep Neck flex supine     20x5\" 20x5\"  20x5\"  20x5\"  15x5\"     Foam roll protocol    X15 ea          Pain/postural ucation, dx edu, vestibular edu PWK         KR   Ther Ex 10/18 10/22 10/28  9/20 9/25 9/27 9/30 10/4 10/7   UBE 3'/3'  3'/3'   3'/3' 3'/3' 3'/3' 3'/3' 3'/3' 3'/3'    Scap stretch    MFD 5 x 10\"           No moneys          GTB x 20    T/S ext, rot   Pball stretch 10x10\" / thoracic rot stretch 5x10 w/ MFD       Pball stretch 10x10\" / thoracic rot stretch 5x10    Tband rows, ext      30x 15# ea 20x 15# ea 20x 15# ea 20x 15# ea 15# x30 ea   B/l ER       20x 5# 20x 5# 20x 5#    B/l IR       20x 5# 20x 5# 20x 5#    D2 Flexion             90/90 ER     20x 2# 20x        Quad Ts, Ys, Ws as able             Prone I, T, Y     2# 15x ea c stabilizatiom to decrease Lshrugh T only  10x all directions 2# on SB 2x10 all directions 2# on SB 10x all directions 2# on SB    Shoulder SHurgs      20x 3\" 2# 20x 3\" 2# 20x 3\" 2# 20x 3\" 2#    Scap retraction      20x3\" 2# 20x3\" 2# 20x3\" 2# 20x3\" 2#    Ther Activity 10/18 10/22 10/28     9/30 10/4 10/7                             Gait Training 10/18 10/22 10/28     9/30 10/4 10/7                             Modalities  10/22 10/28     9/30 " 10/4 10/7   [facility]         8'

## 2024-11-01 ENCOUNTER — OFFICE VISIT (OUTPATIENT)
Dept: PHYSICAL THERAPY | Facility: CLINIC | Age: 39
End: 2024-11-01
Payer: COMMERCIAL

## 2024-11-01 DIAGNOSIS — M54.2 NECK PAIN, CHRONIC: ICD-10-CM

## 2024-11-01 DIAGNOSIS — M54.6 CHRONIC LEFT-SIDED THORACIC BACK PAIN: ICD-10-CM

## 2024-11-01 DIAGNOSIS — G89.29 NECK PAIN, CHRONIC: ICD-10-CM

## 2024-11-01 DIAGNOSIS — H83.2X3 VESTIBULAR HYPOFUNCTION OF BOTH EARS: Primary | ICD-10-CM

## 2024-11-01 DIAGNOSIS — G89.29 CHRONIC LEFT-SIDED THORACIC BACK PAIN: ICD-10-CM

## 2024-11-01 PROCEDURE — 97112 NEUROMUSCULAR REEDUCATION: CPT

## 2024-11-01 PROCEDURE — 97110 THERAPEUTIC EXERCISES: CPT

## 2024-11-01 NOTE — PROGRESS NOTES
Daily Note     Today's date: 2024  Patient name: Elvira Grant  : 1985  MRN: [id number]  Referring provider: Enrique Colmenares MD  Dx:   Encounter Diagnosis     ICD-10-CM    1. Vestibular hypofunction of both ears  H83.2X3       2. Neck pain, chronic  M54.2     G89.29       3. Chronic left-sided thoracic back pain  M54.6     G89.29           Start Time: 0800  Stop Time: 09  Total time in clinic (min): 60 minutes    Subjective: Pt reports that he received an injection in his neck last Friday, which has helped him decrease his overall symptom intensity in his neck and head (headaches, fogginess, and dizziness), but noted it has intensified his shoulder symptoms.       Objective: See treatment diary below      Assessment: Pt tolerated treatment well. Pt had increased dizziness/fogginess with all VOR 1 both vertical and horizontal, VO2, and gaze stabilization. Pt needed moderate cueing for corrections on vestibular exercises, along with rest breaks after each set due to exacerbation of symptoms. Pt had increased difficulty with VO2 and large exacerbation of symptoms, hold exercises in upcoming visits until pt showed improved performance and tolerance with VO1. Added Biodex and progressed balance exercises from last session, which pt remains challenged with, especially with EC. Continue to work on balance and progress as he tolerates. Pt had increased symptom intensity and difficulty in prone ITY, likely due to not performing exercises in few weeks and increased overall shoulder pain today. Patient exhibited good technique with therapeutic exercises and would benefit from continued PT      Plan: Continue per plan of care.  Progress treatment as tolerated.       Precautions:         Date 10/18 10/22 10/28 11/1    9/30 10/4 10/7   Visit # 21 22 23 24    18 19 20   FOTO XXX            Re-eval XXX Eval               Focus on NM control of Left scapula and posterior cervical musculature.     Manuals 10/18  "10/22 10/28 11/1    9/30 10/4 10/7   T/S Mobe             UT/LS Str PWK            Scapular Mob PWK       HY PWK    R Shldr PROM and STM             Cupping    Scap on L           CS gentle traction             SOR PWK       HY PWK    T spine STM PWK       HY PWK    Vestibular Testing  PWK carole-hallpike, BBQ roll           Neuro Re-Ed 10/18 10/22 10/28 11/1    9/30 10/4 10/7   Pt Education  PWK           VOR 1 horizontal  2x30\" 2x 15 3x30\"         VOR 1 Vertical  2x30\" 2x15  3x30\"          VOR 2 Horizontal   L eye pressure/ fatigue x 15 3x15 very  Diffcult  (HOLD TILL VOR1 IMPROVE)         VOR 2 Vertical             Gaze stabilization    4 letters w/ roation 10x ea          SLS    W/rockerboard  3x30\" ea, EC         Tandem Stance   EC foam 3 x 20\"  3x30\" foam EC         Balance Beam   Tandem walking on foam w/ HT HNx 5 laps  3 laps EO, 2 laps EC         Biodex   nv 2x maze floor 6, 3' target trace lvl 6         Thoracic ext    5\" x 10 over foam roll        Scap wall push ups  x 20    Serratus press         30x 2# punches      Doorway Pec Str        3x30\" 3x30\"     Openbooks             SNAG ext             SNAG rot              UT/LS Str             Back              Lax Ball Rollout             Deep Neck flex supine        20x5\"  15x5\"     Foam roll protocol    X15 ea          Pain/postural ucation, dx edu, vestibular edu PWK         KR   Ther Ex 10/18 10/22 10/28 11/1    9/30 10/4 10/7   UBE 3'/3'  3'/3'  3'/3'    3'/3' 3'/3' 3'/3'    Scap stretch    MFD 5 x 10\"           No moneys          GTB x 20    T/S ext, rot   Pball stretch 10x10\" / thoracic rot stretch 5x10 w/ MFD 15x5\" ea over foam roller      Pball stretch 10x10\" / thoracic rot stretch 5x10    Tband rows, ext        20x 15# ea 20x 15# ea 15# x30 ea   B/l ER        20x 5# 20x 5#    B/l IR        20x 5# 20x 5#    D2 Flexion             90/90 ER             Quad Ts, Ys, Ws as able             Prone I, T, Y    20x ea 2# on SB    2x10 all directions 2# " "on SB 10x all directions 2# on SB    Shoulder SHurgs        20x 3\" 2# 20x 3\" 2#    Scap retraction        20x3\" 2# 20x3\" 2#    Ther Activity 10/18 10/22 10/28 11/1    9/30 10/4 10/7                             Gait Training 10/18 10/22 10/28 11/1    9/30 10/4 10/7                             Modalities  10/22 10/28 11/1    9/30 10/4 10/7   [facility]         8'                          "

## 2024-11-04 ENCOUNTER — OFFICE VISIT (OUTPATIENT)
Dept: PHYSICAL THERAPY | Facility: CLINIC | Age: 39
End: 2024-11-04
Payer: COMMERCIAL

## 2024-11-04 DIAGNOSIS — M54.6 CHRONIC LEFT-SIDED THORACIC BACK PAIN: ICD-10-CM

## 2024-11-04 DIAGNOSIS — G89.29 CHRONIC LEFT-SIDED THORACIC BACK PAIN: ICD-10-CM

## 2024-11-04 DIAGNOSIS — M54.2 NECK PAIN, CHRONIC: ICD-10-CM

## 2024-11-04 DIAGNOSIS — H83.2X3 VESTIBULAR HYPOFUNCTION OF BOTH EARS: Primary | ICD-10-CM

## 2024-11-04 DIAGNOSIS — G89.29 NECK PAIN, CHRONIC: ICD-10-CM

## 2024-11-04 PROCEDURE — 97140 MANUAL THERAPY 1/> REGIONS: CPT

## 2024-11-04 PROCEDURE — 97110 THERAPEUTIC EXERCISES: CPT

## 2024-11-04 PROCEDURE — 97010 HOT OR COLD PACKS THERAPY: CPT

## 2024-11-04 NOTE — PROGRESS NOTES
Daily Note     Today's date: 2024  Patient name: Elvira Grant  : 1985  MRN: [id number]  Referring provider: Enrique Colmenares MD  Dx:   Encounter Diagnosis     ICD-10-CM    1. Vestibular hypofunction of both ears  H83.2X3       2. Neck pain, chronic  M54.2     G89.29       3. Chronic left-sided thoracic back pain  M54.6     G89.29           Start Time: 1403          Subjective: Pt presents to the clinic with reports of increased pain in the cervicothoracic area of the spine. States the pain is where he received injections. States the dizziness has been better and not causing him any problems. Pt states he is not having a good day.    Objective: See treatment diary below      Assessment: Pt presents to the clinic with increased pain this date. Manual therapy was utilized this date to try and relax the musculature and all radicular symptoms. Pt tolerated c/s distraction well with no increase in symptoms. Pt experienced some relief of symptoms with manual therapy this date. Will resume regular activities/exercises during the next session. Pt ended session with hot pack to try and relieve symptoms. No excessive redness/irritation observed with removal of hot pack. Tolerated treatment fair. Patient demonstrated fatigue post treatment and would benefit from continued PT      Plan: Continue per plan of care.      Precautions:         Date 10/18 10/22 10/28 11/1 11/4   9/30 10/4 10/7   Visit # 21 22 23 24 25   18 19 20   FOTO XXX    nv        Re-eval XXX Eval               Focus on NM control of Left scapula and posterior cervical musculature.     Manuals 10/18 10/22 10/28 11/1 11/4   9/30 10/4 10/7   T/S Mobe             UT/LS Str PWK            Scapular Mob PWK       HY PWK    R Shldr PROM and STM             Cupping    Scap on L           CS gentle traction     NH 10 min        SOR PWK    NH 3 min   HY PWK    T spine STM PWK    NH   HY PWK    Vestibular Testing  PWK carole-hallpike, BBQ roll          "  Neuro Re-Ed 10/18 10/22 10/28 11/1 11/4   9/30 10/4 10/7   Pt Education  PWK           VOR 1 horizontal  2x30\" 2x 15 3x30\"         VOR 1 Vertical  2x30\" 2x15  3x30\"          VOR 2 Horizontal   L eye pressure/ fatigue x 15 3x15 very  Diffcult  (HOLD TILL VOR1 IMPROVE)         VOR 2 Vertical             Gaze stabilization    4 letters w/ roation 10x ea          SLS    W/rockerboard  3x30\" ea, EC         Tandem Stance   EC foam 3 x 20\"  3x30\" foam EC         Balance Beam   Tandem walking on foam w/ HT HNx 5 laps  3 laps EO, 2 laps EC         Biodex   nv 2x maze floor 6, 3' target trace lvl 6         Thoracic ext    5\" x 10 over foam roll        Scap wall push ups  x 20    Serratus press         30x 2# punches      Doorway Pec Str        3x30\" 3x30\"     Openbooks             SNAG ext             SNAG rot              UT/LS Str             Back              Lax Ball Rollout             Deep Neck flex supine        20x5\"  15x5\"     Foam roll protocol    X15 ea          Pain/postural ucation, dx edu, vestibular edu PWK         KR   Ther Ex 10/18 10/22 10/28 11/1 11/4   9/30 10/4 10/7   UBE 3'/3'  3'/3'  3'/3' 3'/3'   3'/3' 3'/3' 3'/3'    Scap stretch    MFD 5 x 10\"           No moneys          GTB x 20    T/S ext, rot   Pball stretch 10x10\" / thoracic rot stretch 5x10 w/ MFD 15x5\" ea over foam roller 15x5\" ea over foam roller     Pball stretch 10x10\" / thoracic rot stretch 5x10    Tband rows, ext        20x 15# ea 20x 15# ea 15# x30 ea   B/l ER        20x 5# 20x 5#    B/l IR        20x 5# 20x 5#    D2 Flexion             90/90 ER             Quad Ts, Ys, Ws as able             Prone I, T, Y    20x ea 2# on SB    2x10 all directions 2# on SB 10x all directions 2# on SB    Shoulder SHurgs        20x 3\" 2# 20x 3\" 2#    Scap retraction        20x3\" 2# 20x3\" 2#    Ther Activity 10/18 10/22 10/28 11/1    9/30 10/4 10/7                             Gait Training 10/18 10/22 10/28 11/1    9/30 10/4 10/7                     "         Modalities  10/22 10/28 11/1 11/4   9/30 10/4 10/7   [facility]     10'    8'

## 2024-11-08 ENCOUNTER — OFFICE VISIT (OUTPATIENT)
Dept: PHYSICAL THERAPY | Facility: CLINIC | Age: 39
End: 2024-11-08
Payer: COMMERCIAL

## 2024-11-08 DIAGNOSIS — M54.2 NECK PAIN, CHRONIC: ICD-10-CM

## 2024-11-08 DIAGNOSIS — G89.29 CHRONIC LEFT-SIDED THORACIC BACK PAIN: ICD-10-CM

## 2024-11-08 DIAGNOSIS — G89.29 NECK PAIN, CHRONIC: ICD-10-CM

## 2024-11-08 DIAGNOSIS — H83.2X3 VESTIBULAR HYPOFUNCTION OF BOTH EARS: Primary | ICD-10-CM

## 2024-11-08 DIAGNOSIS — M54.6 CHRONIC LEFT-SIDED THORACIC BACK PAIN: ICD-10-CM

## 2024-11-08 PROCEDURE — 97112 NEUROMUSCULAR REEDUCATION: CPT

## 2024-11-08 PROCEDURE — 97010 HOT OR COLD PACKS THERAPY: CPT

## 2024-11-08 PROCEDURE — 97140 MANUAL THERAPY 1/> REGIONS: CPT

## 2024-11-08 NOTE — PROGRESS NOTES
"Daily Note     Today's date: 2024  Patient name: Elvira Grant  : 1985  MRN: [id number]  Referring provider: Enrique Colmenares MD  Dx:   Encounter Diagnosis     ICD-10-CM    1. Vestibular hypofunction of both ears  H83.2X3       2. Neck pain, chronic  M54.2     G89.29       3. Chronic left-sided thoracic back pain  M54.6     G89.29           Start Time: 0800  Stop Time: 905  Total time in clinic (min): 65 minutes    Subjective: Pt reports that he has been having a lot of pain in his neck, upper back, and down his left arm ever since he received his Cortizone shot. He noted that his symptoms were so intense on Monday he was unable to tolerate many exercises. He is having similar symptoms coming into today's session, but noted to a slightly lesser degree. Pt also noted he has noticed his left arm trembling recently, mostly when his arm is at rest or he is holding his phone.      Objective: See treatment diary below      Assessment: Pt tolerated treatment fair. Pt was experiencing \"a floating black dot that increases in size with both end range VOR1 both to the left and right\". Pt noted that this was the first time of him experiencing these symptoms or seeing black dots. Further vestibular testing was performed by PT, with assistance for second PT for an extra option on the case. No adverse symptoms seen by either PT with vestibular or oculomotor testing. Pt was educated on abnormal symptom with VOR1 and noted that he was going to call and schedule another follow up appointment with his neurologist after the session. Added both median and ulnar nerve glides due to pt noted increased symptoms going down his left arm. Pt was challenged with nerve glides, with noted increased symptoms with exercise, but was able to complete all sets and reps with proper form. Patient exhibited good technique with therapeutic exercises and would benefit from continued PT.Continue to monitor how pt is feeling and " "progress as he tolerates.      Plan: Continue per plan of care.  Progress treatment as tolerated.       Precautions:         Date 10/18 10/22 10/28 11/1 11/4 11/8  9/30 10/4 10/7   Visit # 21 22 23 24 25 26  18 19 20   FOTO XXX    nv        Re-eval XXX Eval               Focus on NM control of Left scapula and posterior cervical musculature.     Manuals 10/18 10/22 10/28 11/1 11/4 11/8  9/30 10/4 10/7   T/S Mobe             UT/LS Str PWK     PWK       Scapular Mob PWK       HY PWK    R Shldr PROM and STM             Cupping    Scap on L           CS gentle traction     NH 10 min PWK       SOR PWK    NH 3 min PWK  HY PWK    T spine STM PWK    NH PWK  HY PWK    Vestibular Testing  PWK carole-hallpike, BBQ roll    PWK smooth puriust saccades w. DK       Neuro Re-Ed 10/18 10/22 10/28 11/1 11/4 11/8  9/30 10/4 10/7   Pt Education  PWK           VOR 1 horizontal  2x30\" 2x 15 3x30\"  3x30\"       VOR 1 Vertical  2x30\" 2x15  3x30\"   3x30\"        VOR 2 Horizontal   L eye pressure/ fatigue x 15 3x15 very  Diffcult  (HOLD TILL VOR1 IMPROVE)         VOR 2 Vertical             Gaze stabilization    4 letters w/ roation 10x ea   4 letters w/ rotation 10x       Ulnar Nerve Glide     30x 30x       Median Nerve Glide     30x 30x       SLS    W/rockerboard  3x30\" ea, EC         Tandem Stance   EC foam 3 x 20\"  3x30\" foam EC         Balance Beam   Tandem walking on foam w/ HT HNx 5 laps  3 laps EO, 2 laps EC         Biodex   nv 2x maze floor 6, 3' target trace lvl 6         Thoracic ext    5\" x 10 over foam roll        Scap wall push ups  x 20    Serratus press         30x 2# punches      Doorway Pec Str        3x30\" 3x30\"     Openbooks             SNAG ext      15x5\"        SNAG rot       15x5\"       UT/LS Str      3x30\" L       Back              Lax Ball Rollout             Deep Neck flex supine        20x5\"  15x5\"     Foam roll protocol    X15 ea          Pain/postural ucation, dx edu, vestibular edu PWK         KR   Ther Ex " "10/18 10/22 10/28 11/1 11/4   9/30 10/4 10/7   UBE 3'/3'  3'/3'  3'/3' 3'/3'   3'/3' 3'/3' 3'/3'    Scap stretch    MFD 5 x 10\"           No moneys          GTB x 20    T/S ext, rot   Pball stretch 10x10\" / thoracic rot stretch 5x10 w/ MFD 15x5\" ea over foam roller 15x5\" ea over foam roller     Pball stretch 10x10\" / thoracic rot stretch 5x10    Tband rows, ext        20x 15# ea 20x 15# ea 15# x30 ea   B/l ER        20x 5# 20x 5#    B/l IR        20x 5# 20x 5#    D2 Flexion             90/90 ER             Quad Ts, Ys, Ws as able             Prone I, T, Y    20x ea 2# on SB    2x10 all directions 2# on SB 10x all directions 2# on SB    Shoulder SHurgs        20x 3\" 2# 20x 3\" 2#    Scap retraction        20x3\" 2# 20x3\" 2#    Ther Activity 10/18 10/22 10/28 11/1    9/30 10/4 10/7                             Gait Training 10/18 10/22 10/28 11/1    9/30 10/4 10/7                             Modalities  10/22 10/28 11/1 11/4 11/8  9/30 10/4 10/7   MHP     10' 10'   8'                              "

## 2024-11-11 ENCOUNTER — OFFICE VISIT (OUTPATIENT)
Dept: PHYSICAL THERAPY | Facility: CLINIC | Age: 39
End: 2024-11-11
Payer: COMMERCIAL

## 2024-11-11 DIAGNOSIS — M54.6 CHRONIC LEFT-SIDED THORACIC BACK PAIN: ICD-10-CM

## 2024-11-11 DIAGNOSIS — G89.29 CHRONIC LEFT-SIDED THORACIC BACK PAIN: ICD-10-CM

## 2024-11-11 DIAGNOSIS — M54.2 NECK PAIN, CHRONIC: ICD-10-CM

## 2024-11-11 DIAGNOSIS — G89.29 NECK PAIN, CHRONIC: ICD-10-CM

## 2024-11-11 DIAGNOSIS — H83.2X3 VESTIBULAR HYPOFUNCTION OF BOTH EARS: Primary | ICD-10-CM

## 2024-11-11 PROCEDURE — 97112 NEUROMUSCULAR REEDUCATION: CPT

## 2024-11-11 PROCEDURE — 97110 THERAPEUTIC EXERCISES: CPT

## 2024-11-11 PROCEDURE — 97140 MANUAL THERAPY 1/> REGIONS: CPT

## 2024-11-11 PROCEDURE — 97530 THERAPEUTIC ACTIVITIES: CPT

## 2024-11-11 NOTE — PROGRESS NOTES
"Daily Note     Today's date: 2024  Patient name: Elvira Grant  : 1985  MRN: [id number]  Referring provider: Enrique Colmenares MD  Dx:   Encounter Diagnosis     ICD-10-CM    1. Vestibular hypofunction of both ears  H83.2X3       2. Neck pain, chronic  M54.2     G89.29       3. Chronic left-sided thoracic back pain  M54.6     G89.29                        Subjective: \" The pain just came back today from when we did the cupping-my whole neck is tight - I didn't do anything different\"       Objective: See treatment diary below      Assessment: Elvira presents to therapy with neck/ shoulder pain and vestibular deficits. Increased focus on manual release of the cervical/ scap region today noting increased palpable restriction throughout. Reduced restriction post manual release with palpable banding/ hypertonicity throughout. Noted increased inflammation with MFD- improved mobility and pain post.  Tolerated session without adverse effects. Recommend continued skilled therapy to improve overall strength and mobility for functional return with decreased compensation and pain.        Plan: Continue per plan of care.  Progress treatment as tolerated.       Precautions:         Date 10/18 10/22 10/28 11/1 11/4 11/8 11/11 9/30 10/4 10/7   Visit # 21 22 23 24 25 26 27 18 19 20   FOTO XXX    nv        Re-eval XXX Eval               Focus on NM control of Left scapula and posterior cervical musculature.     Manuals 10/18 10/22 10/28 11/1 11/4 11/8 11/11 9/30 10/4 10/7   T/S Mobe             UT/LS Str PWK     PWK KR      Scapular Mob PWK       HY PWK    R Shldr PROM and STM             Cupping    Scap on L     KR      CS gentle traction     NH 10 min PWK KR and MFR       SOR PWK    NH 3 min PWK KR  HY PWK    T spine STM PWK    NH PWK  HY PWK    Vestibular Testing  PWK carole-hallpike, BBQ roll    PWK smooth puriust saccades w. DK       Neuro Re-Ed 10/18 10/22 10/28 11/1 11/4 11/8 11/11 9/30 10/4 10/7   Pt Education  " "PWK           VOR 1 horizontal  2x30\" 2x 15 3x30\"  3x30\"       VOR 1 Vertical  2x30\" 2x15  3x30\"   3x30\"        VOR 2 Horizontal   L eye pressure/ fatigue x 15 3x15 very  Diffcult  (HOLD TILL VOR1 IMPROVE)         VOR 2 Vertical             Gaze stabilization    4 letters w/ roation 10x ea   4 letters w/ rotation 10x       Ulnar Nerve Glide     30x 30x       Median Nerve Glide     30x 30x       SLS    W/rockerboard  3x30\" ea, EC         Tandem Stance   EC foam 3 x 20\"  3x30\" foam EC         Balance Beam   Tandem walking on foam w/ HT HNx 5 laps  3 laps EO, 2 laps EC         Biodex   nv 2x maze floor 6, 3' target trace lvl 6         Thoracic ext    5\" x 10 over foam roll        Scap wall push ups  x 20    Serratus press         30x 2# punches      Doorway Pec Str        3x30\" 3x30\"     Openbooks             SNAG ext      15x5\"        SNAG rot       15x5\"       UT/LS Str      3x30\" L       Back              Lax Ball Rollout             Deep Neck flex supine        20x5\"  15x5\"     Foam roll protocol    X15 ea    X15 3 x 20\" pec       Pain/postural ucation, dx edu, vestibular edu PWK         KR   Ther Ex 10/18 10/22 10/28 11/1 11/4 11/8 11/11 9/30 10/4 10/7   UBE 3'/3'  3'/3'  3'/3' 3'/3'  3'post 3'/3' 3'/3' 3'/3'    Scap stretch    MFD 5 x 10\"           No moneys          GTB x 20    T/S ext, rot   Pball stretch 10x10\" / thoracic rot stretch 5x10 w/ MFD 15x5\" ea over foam roller 15x5\" ea over foam roller     Pball stretch 10x10\" / thoracic rot stretch 5x10    Tband rows, ext        20x 15# ea 20x 15# ea 15# x30 ea   B/l ER        20x 5# 20x 5#    B/l IR        20x 5# 20x 5#    D2 Flexion             Pball roll out        10x10\"      Quad Ts, Ys, Ws as able             Prone I, T, Y    20x ea 2# on SB    2x10 all directions 2# on SB 10x all directions 2# on SB    Shoulder SHurgs        20x 3\" 2# 20x 3\" 2#    Scap retraction        20x3\" 2# 20x3\" 2#    Ther Activity 10/18 10/22 10/28 11/1  11/8 11/11 9/30 10/4 " 10/7                             Gait Training 10/18 10/22 10/28 11/1    9/30 10/4 10/7                             Modalities  10/22 10/28 11/1 11/4 11/8  9/30 10/4 10/7   [facility]     10' 10'   8'

## 2024-11-15 ENCOUNTER — OFFICE VISIT (OUTPATIENT)
Dept: PHYSICAL THERAPY | Facility: CLINIC | Age: 39
End: 2024-11-15
Payer: COMMERCIAL

## 2024-11-15 DIAGNOSIS — H83.2X3 VESTIBULAR HYPOFUNCTION OF BOTH EARS: Primary | ICD-10-CM

## 2024-11-15 DIAGNOSIS — M54.6 CHRONIC LEFT-SIDED THORACIC BACK PAIN: ICD-10-CM

## 2024-11-15 DIAGNOSIS — G89.29 CHRONIC LEFT-SIDED THORACIC BACK PAIN: ICD-10-CM

## 2024-11-15 DIAGNOSIS — M54.2 NECK PAIN, CHRONIC: ICD-10-CM

## 2024-11-15 DIAGNOSIS — G89.29 NECK PAIN, CHRONIC: ICD-10-CM

## 2024-11-15 PROCEDURE — 97140 MANUAL THERAPY 1/> REGIONS: CPT

## 2024-11-15 PROCEDURE — 97112 NEUROMUSCULAR REEDUCATION: CPT

## 2024-11-15 PROCEDURE — 97110 THERAPEUTIC EXERCISES: CPT

## 2024-11-15 NOTE — PROGRESS NOTES
"Daily Note     Today's date: 11/15/2024  Patient name: Elvira Grant  : 1985  MRN: [id number]  Referring provider: Enrique Colmenares MD  Dx:   Encounter Diagnosis     ICD-10-CM    1. Vestibular hypofunction of both ears  H83.2X3       2. Neck pain, chronic  M54.2     G89.29       3. Chronic left-sided thoracic back pain  M54.6     G89.29           Start Time: 0800  Stop Time: 0840  Total time in clinic (min): 40 minutes    Subjective: Pt reports that his upper trap region is sore coming into today's session but for the previous couple of days it was his throacic paraspinals and scaular stabilization muscles that casued his discomfort. Pt also reports that since starting PT and medications for vestibular issues, it has gotten slightly better with less intense symptoms       Objective: See treatment diary below      Assessment: Pt tolerated treatment well. Pt responded well to all cervical STM and stretching during today's session, with successful reduction of symptoms post manual interventions. Primary STM and stretching was focused on SCM due to increased tension felt and seen with palpation and noted by pt. Pt also showed improved tolerance with vestibular exercises, not having any increased symptoms throughout. Pt did note that he was still seeing a \"black and white dot\" and end range ocular field with VOR1 in both vertical and horizontal, but not increased fuzziness or increased symptoms in his head. Patient exhibited good technique with therapeutic exercises and would benefit from continued PT      Plan: Continue per plan of care.  Progress treatment as tolerated.       Precautions:         Date 10/18 10/22 10/28 11/1 11/4 11/8 11/11 11/15     Visit # 21 22 23 24 25 26 27 28     FOTO XXX    nv   XXX     Re-eval XXX Eval               Focus on NM control of Left scapula and posterior cervical musculature.     Manuals 10/18 10/22 10/28 11/1 11/4 11/8 11/11 11/15     T/S Joseluise             UT/LS Str PWK " "    PWK KR PWK +SCM     Scapular Mob PWK            R Shldr PROM and STM             Cupping    Scap on L     KR      CS gentle traction     NH 10 min PWK KR and MFR       SOR PWK    NH 3 min PWK KR  PWK     T spine STM PWK    NH PWK  PWK      Vestibular Testing  PWK carole-hallpike, BBQ roll    PWK smooth puriust saccades w. DK       Neuro Re-Ed 10/18 10/22 10/28 11/1 11/4 11/8 11/11 11/15     Pt Education  PWK           VOR 1 horizontal  2x30\" 2x 15 3x30\"  3x30\"  3x30\"     VOR 1 Vertical  2x30\" 2x15  3x30\"   3x30\"   3x30\"     VOR 2 Horizontal   L eye pressure/ fatigue x 15 3x15 very  Diffcult  (HOLD TILL VOR1 IMPROVE)         VOR 2 Vertical             Gaze stabilization    4 letters w/ roation 10x ea   4 letters w/ rotation 10x  4 letters w/ rooation 10x     Ulnar Nerve Glide     30x 30x       Median Nerve Glide     30x 30x       SLS    W/rockerboard  3x30\" ea, EC         Tandem Stance   EC foam 3 x 20\"  3x30\" foam EC         Balance Beam   Tandem walking on foam w/ HT HNx 5 laps  3 laps EO, 2 laps EC         Biodex   nv 2x maze floor 6, 3' target trace lvl 6         Thoracic ext    5\" x 10 over foam roll           Serratus press              Doorway Pec Str             Openbooks             SNAG ext      15x5\"        SNAG rot       15x5\"       UT/LS Str      3x30\" L       Back              Lax Ball Rollout             Deep Neck flex supine             Foam roll protocol    X15 ea    X15 3 x 20\" pec       Pain/postural ucation, dx edu, vestibular edu PWK            Ther Ex 10/18 10/22 10/28 11/1 11/4 11/8 11/11 11/15     UBE 3'/3'  3'/3'  3'/3' 3'/3'  3'post 3'/3'     Scap stretch    MFD 5 x 10\"           No moneys             T/S ext, rot   Pball stretch 10x10\" / thoracic rot stretch 5x10 w/ MFD 15x5\" ea over foam roller 15x5\" ea over foam roller   15x 5\" ea over FR     Tband rows, ext             B/l ER             B/l IR             D2 Flexion             Pball roll out        10x10\"      Quad Ts, Ys, Ws " as able             Prone I, T, Y    20x ea 2# on SB         Shoulder SHurgs             Scap retraction             Ther Activity 10/18 10/22 10/28 11/1  11/8 11/11                                Gait Training 10/18 10/22 10/28 11/1                                   Modalities  10/22 10/28 11/1 11/4 11/8       MHP     10' 10'

## 2024-11-18 ENCOUNTER — OFFICE VISIT (OUTPATIENT)
Dept: PHYSICAL THERAPY | Facility: CLINIC | Age: 39
End: 2024-11-18
Payer: COMMERCIAL

## 2024-11-18 DIAGNOSIS — M54.2 NECK PAIN, CHRONIC: ICD-10-CM

## 2024-11-18 DIAGNOSIS — G89.29 CHRONIC LEFT-SIDED THORACIC BACK PAIN: ICD-10-CM

## 2024-11-18 DIAGNOSIS — G89.29 NECK PAIN, CHRONIC: ICD-10-CM

## 2024-11-18 DIAGNOSIS — H83.2X3 VESTIBULAR HYPOFUNCTION OF BOTH EARS: Primary | ICD-10-CM

## 2024-11-18 DIAGNOSIS — M54.6 CHRONIC LEFT-SIDED THORACIC BACK PAIN: ICD-10-CM

## 2024-11-18 PROCEDURE — 97530 THERAPEUTIC ACTIVITIES: CPT

## 2024-11-18 PROCEDURE — 97112 NEUROMUSCULAR REEDUCATION: CPT

## 2024-11-18 PROCEDURE — 97140 MANUAL THERAPY 1/> REGIONS: CPT

## 2024-11-18 PROCEDURE — 97110 THERAPEUTIC EXERCISES: CPT

## 2024-11-18 NOTE — PROGRESS NOTES
"Daily Note     Today's date: 2024  Patient name: Elvira Grant  : 1985  MRN: [id number]  Referring provider: Enrique Colmenares MD  Dx:   Encounter Diagnosis     ICD-10-CM    1. Vestibular hypofunction of both ears  H83.2X3       2. Neck pain, chronic  M54.2     G89.29       3. Chronic left-sided thoracic back pain  M54.6     G89.29                        Subjective: \" I have pain in the neck and a coupe days ago my fingers were numb and today my thumb locked up a bit\"       Objective: See treatment diary below      Assessment: Elvira presents to therapy with shoulder/ neck pain and dizziness. Added 1st rib mob due to increased symptoms in the arm noting relief of tingling post. 1st rib elevation noted secondary to increased UT and levator restriction. Note ant rotation in shoulder with increased pec restriction. Focused on decreased post chain activation with fatigue noted. Tolerated session without adverse effects. Recommend continued skilled therapy to improve overall strength and mobility for functional return with decreased compensation and pain.        Plan: Continue per plan of care.  Progress treatment as tolerated.       Precautions:         Date 10/18 10/22 10/28 11/1 11/4 11/8 11/11 11/15 11/18    Visit # 21 22 23 24 25 26 27 28 29    FOTO XXX    nv   XXX     Re-eval XXX Eval               Focus on NM control of Left scapula and posterior cervical musculature.     Manuals 10/18 10/22 10/28 11/1 11/4 11/8 11/11 11/15 11/18    T/S Mobe             UT/LS Str PWK     PWK KR PWK +SCM     Scapular Mob PWK            R Shldr PROM and STM         1st rib mob grade IV KR     Cupping    Scap on L     KR      CS gentle traction     NH 10 min PWK KR and MFR   KR and MFR / pec release     SOR PWK    NH 3 min PWK KR  PWK KR     T spine STM PWK    NH PWK  PWK      Vestibular Testing  PWK carole-hallpike, BBQ roll    PWK smooth puriust saccades w. DK       Neuro Re-Ed 10/18 10/22 10/28 11/1 11/4 11/8 11/11 " "11/15 11/18    Pt Education  PWK           VOR 1 horizontal  2x30\" 2x 15 3x30\"  3x30\"  3x30\"     VOR 1 Vertical  2x30\" 2x15  3x30\"   3x30\"   3x30\"     VOR 2 Horizontal   L eye pressure/ fatigue x 15 3x15 very  Diffcult  (HOLD TILL VOR1 IMPROVE)         VOR 2 Vertical             Gaze stabilization    4 letters w/ roation 10x ea   4 letters w/ rotation 10x  4 letters w/ rooation 10x     Ulnar Nerve Glide     30x 30x   Manual x 15     Median Nerve Glide     30x 30x   Manual x 15     SLS    W/rockerboard  3x30\" ea, EC         Tandem Stance   EC foam 3 x 20\"  3x30\" foam EC         Balance Beam   Tandem walking on foam w/ HT HNx 5 laps  3 laps EO, 2 laps EC         Biodex   nv 2x maze floor 6, 3' target trace lvl 6         Thoracic ext    5\" x 10 over foam roll       5\" x 10 over foam roll     Serratus press              Doorway Pec Str             Openbooks             SNAG ext      15x5\"        SNAG rot       15x5\"       UT/LS Str      3x30\" L       Back              pball stretch          10x10\"     Deep Neck flex supine             Foam roll protocol    X15 ea    X15 3 x 20\" pec       Pain/postural ucation, dx edu, vestibular edu PWK            Ther Ex 10/18 10/22 10/28 11/1 11/4 11/8 11/11 11/15 11/18    UBE 3'/3'  3'/3'  3'/3' 3'/3'  3'post 3'/3' 3'/ 3'    Scap stretch    MFD 5 x 10\"           No moneys             T/S ext, rot   Pball stretch 10x10\" / thoracic rot stretch 5x10 w/ MFD 15x5\" ea over foam roller 15x5\" ea over foam roller   15x 5\" ea over FR     Tband rows, ext         nv    B/l ER             B/l IR             D2 Flexion             Pball roll out        10x10\"  10x10\"     Quad Ts, Ys, Ws as able         Prone x 20 ea     Prone I, T, Y    20x ea 2# on SB     Prone x 30 ea     Shoulder SHurgs             Scap retraction             Ther Activity 10/18 10/22 10/28 11/1  11/8 11/11  11/18                              Gait Training 10/18 10/22 10/28 11/1                                 "   Modalities  10/22 10/28 11/1 11/4 11/8   11/18    [facility]     10' 10'

## 2024-11-22 ENCOUNTER — OFFICE VISIT (OUTPATIENT)
Dept: PHYSICAL THERAPY | Facility: CLINIC | Age: 39
End: 2024-11-22
Payer: COMMERCIAL

## 2024-11-22 DIAGNOSIS — M54.2 NECK PAIN, CHRONIC: ICD-10-CM

## 2024-11-22 DIAGNOSIS — G89.29 CHRONIC LEFT-SIDED THORACIC BACK PAIN: ICD-10-CM

## 2024-11-22 DIAGNOSIS — H83.2X3 VESTIBULAR HYPOFUNCTION OF BOTH EARS: Primary | ICD-10-CM

## 2024-11-22 DIAGNOSIS — M54.6 CHRONIC LEFT-SIDED THORACIC BACK PAIN: ICD-10-CM

## 2024-11-22 DIAGNOSIS — G89.29 NECK PAIN, CHRONIC: ICD-10-CM

## 2024-11-22 PROCEDURE — 97110 THERAPEUTIC EXERCISES: CPT

## 2024-11-22 PROCEDURE — 97112 NEUROMUSCULAR REEDUCATION: CPT

## 2024-11-22 NOTE — PROGRESS NOTES
Daily Note     Today's date: 2024  Patient name: Elvira Grant  : 1985  MRN: [id number]  Referring provider: Enrique Colmenares MD  Dx:   Encounter Diagnosis     ICD-10-CM    1. Vestibular hypofunction of both ears  H83.2X3       2. Neck pain, chronic  M54.2     G89.29       3. Chronic left-sided thoracic back pain  M54.6     G89.29           Start Time: 0800  Stop Time: 0850  Total time in clinic (min): 50 minutes    Subjective: Pt reports that he is feeling bad overall coming into today's session. He reports that he attempted to stop taking his concussion medication the past few days and he noted that he has been more foggy and forgetful than before he started taking the medication. Pt also reported that he is having pain in his shoulder and neck coming into the session. Pt reported that his chiropractor discharged his due to no more success with treatments. He also noted he is contacting his neurologist later today for a follow up appointment.        Objective: See treatment diary below      Assessment: Pt tolerated treatment well. Pt remains to have ocular visual disturbance with VOR 1 with both horizontal and vertical end range eye movements. Pt describes the disturbance as always in line with the pen, but 5 inches away from it, and a black, grey, shadow appearance. Pt also reports dizziness during gaze stabilization exercse, noting that the dizziness lasts for 5 seconds at a time, but is very intense requiring pt to take several rest breaks. Pt was encouraged to make follow up appointment with neurologist, for further check up on vestibular/neurological symptoms.Patient exhibited good technique with therapeutic exercises and would benefit from continued PT      Plan: Continue per plan of care.  Progress treatment as tolerated.       Precautions:         Date 10/18 10/22 10/28 11/1 11/4 11/8 11/11 11/15 11/18 11/22   Visit # 21 22 23 24 25 26 27 28 29 30   FOTO XXX    nv   XXX     Re-eval XXX  "Eval               Focus on NM control of Left scapula and posterior cervical musculature.     Manuals 10/18 10/22 10/28 11/1 11/4 11/8 11/11 11/15 11/18 11/22   T/S Mobe             UT/LS Str PWK     PWK KR PWK +SCM     Scapular Mob PWK            R Shldr PROM and STM         1st rib mob grade IV KR     Cupping    Scap on L     KR      CS gentle traction     NH 10 min PWK KR and MFR   KR and MFR / pec release     SOR PWK    NH 3 min PWK KR  PWK KR     T spine STM PWK    NH PWK  PWK      Vestibular Testing  PWK carole-hallpike, BBQ roll    PWK smooth puriust saccades w. DK       Neuro Re-Ed 10/18 10/22 10/28 11/1 11/4 11/8 11/11 11/15 11/18 11/22   Pt Education  PWK           VOR 1 horizontal  2x30\" 2x 15 3x30\"  3x30\"  3x30\"  3x30\"   VOR 1 Vertical  2x30\" 2x15  3x30\"   3x30\"   3x30\"  3x30\"   VOR 2 Horizontal   L eye pressure/ fatigue x 15 3x15 very  Diffcult  (HOLD TILL VOR1 IMPROVE)         VOR 2 Vertical             Gaze stabilization    4 letters w/ roation 10x ea   4 letters w/ rotation 10x  4 letters w/ rooation 10x  4 letters w/ rotation 10x   Ulnar Nerve Glide     30x 30x   Manual x 15     Median Nerve Glide     30x 30x   Manual x 15     SLS    W/rockerboard  3x30\" ea, EC         Tandem Stance   EC foam 3 x 20\"  3x30\" foam EC      3x30\" EC on foam   Balance Beam   Tandem walking on foam w/ HT HNx 5 laps  3 laps EO, 2 laps EC         Biodex   nv 2x maze floor 6, 3' target trace lvl 6         Thoracic ext    5\" x 10 over foam roll       5\" x 10 over foam roll     Serratus press              Doorway Pec Str             Openbooks             SNAG ext      15x5\"        SNAG rot       15x5\"       UT/LS Str      3x30\" L       Back              pball stretch          10x10\"     Deep Neck flex supine             Foam roll protocol    X15 ea    X15 3 x 20\" pec       Pain/postural ucation, dx edu, vestibular edu PWK            Ther Ex 10/18 10/22 10/28 11/1 11/4 11/8 11/11 11/15 11/18 11/22   UBE 3'/3'  3'/3'  3'/3' " "3'/3'  3'post 3'/3' 3'/ 3' 3'/3'   Scap stretch    MFD 5 x 10\"           No moneys             T/S ext, rot   Pball stretch 10x10\" / thoracic rot stretch 5x10 w/ MFD 15x5\" ea over foam roller 15x5\" ea over foam roller   15x 5\" ea over FR     Tband rows, ext         nv 20x ea 10#   B/l ER             B/l IR             D2 Flexion             Pball roll out        10x10\"  10x10\"  10x3\" ea 3-way   Quad Ts, Ys, Ws as able         Prone x 20 ea     Prone I, T, Y    20x ea 2# on SB     Prone x 30 ea     Shoulder SHurgs             Scap retraction             Ther Activity 10/18 10/22 10/28 11/1  11/8 11/11  11/18                              Gait Training 10/18 10/22 10/28 11/1                                   Modalities  10/22 10/28 11/1 11/4 11/8   11/18    MHP     10' 10'                                     "

## 2024-11-25 ENCOUNTER — OFFICE VISIT (OUTPATIENT)
Dept: PHYSICAL THERAPY | Facility: CLINIC | Age: 39
End: 2024-11-25
Payer: COMMERCIAL

## 2024-11-25 DIAGNOSIS — H83.2X3 VESTIBULAR HYPOFUNCTION OF BOTH EARS: Primary | ICD-10-CM

## 2024-11-25 DIAGNOSIS — M54.6 CHRONIC LEFT-SIDED THORACIC BACK PAIN: ICD-10-CM

## 2024-11-25 DIAGNOSIS — G89.29 NECK PAIN, CHRONIC: ICD-10-CM

## 2024-11-25 DIAGNOSIS — G89.29 CHRONIC LEFT-SIDED THORACIC BACK PAIN: ICD-10-CM

## 2024-11-25 DIAGNOSIS — M54.2 NECK PAIN, CHRONIC: ICD-10-CM

## 2024-11-25 PROCEDURE — 97112 NEUROMUSCULAR REEDUCATION: CPT

## 2024-11-25 PROCEDURE — 97110 THERAPEUTIC EXERCISES: CPT

## 2024-11-25 PROCEDURE — 97140 MANUAL THERAPY 1/> REGIONS: CPT

## 2024-11-25 NOTE — PROGRESS NOTES
"Daily Note     Today's date: 2024  Patient name: Elvira Grant  : 1985  MRN: [id number]  Referring provider: Enrique Colmenares MD  Dx:   Encounter Diagnosis     ICD-10-CM    1. Vestibular hypofunction of both ears  H83.2X3       2. Neck pain, chronic  M54.2     G89.29       3. Chronic left-sided thoracic back pain  M54.6     G89.29             Start Time: 1704          Subjective: \"  I just feel it in my neck and shoulder blade\"       Objective: See treatment diary below      Assessment: Elvira presents to therapy with neck pain and visual deficits. Focused on increased cervical and shoulder blade mobility noting slight improvement post stretching with reduced UT shoulder compensation. Note increased levator restriction throughout slight improvement post release. Improved scap activation with prone exercises. Tolerated session without adverse effects. Recommend continued skilled therapy to improve overall strength and mobility for functional return with decreased compensation and pain.        Plan: Continue per plan of care.  Progress treatment as tolerated.       Precautions:         Date 11/25 10/22 10/28 11/1 11/4 11/8 11/11 11/15 11/18 11/22   Visit # 31 22 23 24 25 26 27 28 29 30   FOTO     nv   XXX     Re-eval  Eval               Focus on NM control of Left scapula and posterior cervical musculature.     Manuals 11/25 10/22 10/28 11/1 11/4 11/8 11/11 11/15 11/18 11/22   T/S Mobe             UT/LS Str      PWK KR PWK +SCM     Scapular Mob             R Shldr PROM and STM         1st rib mob grade IV KR     Cupping    Scap on L     KR      CS gentle traction     NH 10 min PWK KR and MFR   KR and MFR / pec release     SOR     NH 3 min PWK KR  PWK KR     T spine STM     NH PWK  PWK      Vestibular Testing  PWK carole-hallpike, BBQ roll    PWK smooth puriust saccades w. DK       Neuro Re-Ed 11/25 10/22 10/28 11/1 11/4 11/8 11/11 11/15 11/18 11/22   Pt Education  PWK           VOR 1 horizontal  2x30\" " "2x 15 3x30\"  3x30\"  3x30\"  3x30\"   VOR 1 Vertical  2x30\" 2x15  3x30\"   3x30\"   3x30\"  3x30\"   VOR 2 Horizontal   L eye pressure/ fatigue x 15 3x15 very  Diffcult  (HOLD TILL VOR1 IMPROVE)         VOR 2 Vertical             Gaze stabilization    4 letters w/ roation 10x ea   4 letters w/ rotation 10x  4 letters w/ rooation 10x  4 letters w/ rotation 10x   Ulnar Nerve Glide     30x 30x   Manual x 15     Median Nerve Glide     30x 30x   Manual x 15     SLS    W/rockerboard  3x30\" ea, EC         Tandem Stance   EC foam 3 x 20\"  3x30\" foam EC      3x30\" EC on foam   Balance Beam   Tandem walking on foam w/ HT HNx 5 laps  3 laps EO, 2 laps EC         Biodex   nv 2x maze floor 6, 3' target trace lvl 6         Thoracic ext  5x10\" over foam roll   5\" x 10 over foam roll       5\" x 10 over foam roll     Serratus press              Doorway Pec Str             Openbooks             SNAG ext      15x5\"        SNAG rot       15x5\"       UT/LS Str 3x30\" with towel      3x30\" L       Back              pball stretch          10x10\"     Deep Neck flex supine             Foam roll protocol    X15 ea    X15 3 x 20\" pec       Pain/postural ucation, dx edu, vestibular edu             Ther Ex 11/25 10/22 10/28 11/1 11/4 11/8 11/11 11/15 11/18 11/22   UBE   3'/3'  3'/3' 3'/3'  3'post 3'/3' 3'/ 3' 3'/3'   Scap stretch    MFD 5 x 10\"           No moneys             T/S ext, rot   Pball stretch 10x10\" / thoracic rot stretch 5x10 w/ MFD 15x5\" ea over foam roller 15x5\" ea over foam roller   15x 5\" ea over FR     Tband rows, ext         nv 20x ea 10#   B/l ER             B/l IR             D2 Flexion             Pball roll out  5x10\" 3 way       10x10\"  10x10\"  10x3\" ea 3-way   Quad Ts, Ys, Ws as able         Prone x 20 ea     Prone I, T, Y 20x ea    20x ea 2# on SB     Prone x 30 ea     Shoulder SHurgs             Scap retraction             Ther Activity  10/22 10/28 11/1  11/8 11/11  11/18                              Gait Training  " 10/22 10/28 11/1                                   Modalities  10/22 10/28 11/1 11/4 11/8   11/18    [facility]     10' 10'

## 2024-11-29 ENCOUNTER — EVALUATION (OUTPATIENT)
Dept: PHYSICAL THERAPY | Facility: CLINIC | Age: 39
End: 2024-11-29
Payer: COMMERCIAL

## 2024-11-29 DIAGNOSIS — G89.29 NECK PAIN, CHRONIC: ICD-10-CM

## 2024-11-29 DIAGNOSIS — M54.6 CHRONIC LEFT-SIDED THORACIC BACK PAIN: ICD-10-CM

## 2024-11-29 DIAGNOSIS — H83.2X3 VESTIBULAR HYPOFUNCTION OF BOTH EARS: Primary | ICD-10-CM

## 2024-11-29 DIAGNOSIS — M54.2 NECK PAIN, CHRONIC: ICD-10-CM

## 2024-11-29 DIAGNOSIS — G89.29 CHRONIC LEFT-SIDED THORACIC BACK PAIN: ICD-10-CM

## 2024-11-29 PROCEDURE — 97164 PT RE-EVAL EST PLAN CARE: CPT

## 2024-11-29 PROCEDURE — 97110 THERAPEUTIC EXERCISES: CPT

## 2024-11-29 PROCEDURE — 97112 NEUROMUSCULAR REEDUCATION: CPT

## 2024-11-29 PROCEDURE — 97140 MANUAL THERAPY 1/> REGIONS: CPT

## 2024-11-29 NOTE — PROGRESS NOTES
Re-Evaluation     Today's date: 2024  Patient name: Elvira Grant  : 1985  MRN: [id number]  Referring provider: Enrique Colmenares MD  Dx:   Encounter Diagnosis     ICD-10-CM    1. Vestibular hypofunction of both ears  H83.2X3       2. Neck pain, chronic  M54.2     G89.29       3. Chronic left-sided thoracic back pain  M54.6     G89.29           Start Time: 08  Stop Time: 08  Total time in clinic (min): 50 minutes    Subjective: Pt reports that he was feeling good coming into today's session with minimal pain or discomfort overall, due to minimal overall activity over the last few days. Pt reported that after UBE and UT/LS stretch, he reported that he was having pain in his left side of his neck, and numbness and tingling going down his left arm all the way to his thumb.       Objective: See treatment diary below    Pain  Current pain ratin  At worst pain ratin  At best pain ratin    Cervical exam   Ligament Laxity Testing   Alar ligament: WNL  Sharp Shane: WNL    Modified VBI   Seated posture: forward head posture    Oculomotor exam   Oculomotor ROM: WNL  Resting nystagmus: not present   Gaze holding nystagmus: not present left  and not present right  Smooth pursuits: within normal limits  Vertical saccades: normal  Horizontal saccades: normal and twitching of eyelids with smooth purisit back to central at both right and left side coming back to midline  Convergence: normal  Cover test: normal  Crossover test: normal  Head thrust: left normal and right normal    Positional testing   Mauricio-Hallpike   Left posterior canal: WNL  Right posterior canal: WNL  Roll test   Left horizontal canal: WNL  Right horizontal canal: WNL  Positional testing comment: decreased focus on both stationary and moving objects  Poor control with VOR 1 both vertically and horizontally  Increased dryness of eyes and twitching of eyelids with positional testing      Balance assessments   MCTSIB   Eyes open  level surface: 60 sec  Eyes open foam surface: 60 seconds  Eyes closed level surface: 60 sec, sway side to side  Eyes closed foam surface: 60 seconds, extreme sway, no loss of balance    Palpation   Left   Hypertonic in the lower trapezius, middle trapezius and upper trapezius.   Hypotonic in the levator scapulae.   Tenderness of the cervical paraspinals, levator scapulae, lower trapezius, middle trapezius and upper trapezius.   Trigger point to cervical paraspinals, levator scapulae, lower trapezius, middle trapezius and upper trapezius.     Right   Hypertonic in the serratus anterior.   Tenderness of the levator scapulae, serratus anterior and teres minor.   Trigger point to levator scapulae, teres minor and upper trapezius.       Active Range of Motion   Cervical  Subcranial protraction:  WFL   Subcranial retraction: minimal  Flexion:  minimal w pain  Extension:  WFL w pain  Left lateral flexion:  minmal with pain  Right lateral flexion:  WFL   Left rotation:   Restriction level: WFL  Right rotation:  Restriction level: WFL  Left Shoulder   Normal active range of motion    B/l Shoulder   Normal active range of motion    Additional Active Range of Motion Details  Extension compensation excessive upper cervical extension , lower cervical extension     Scapular Mobility     Left Shoulder   Scapular Dyskinesis: grade I  Scapular mobility: good  Scapular Mobility with Shoulder to 90° FF   Scapular winging: minimal  Scapular elevation: minimal  Upward rotation: premature  Downward rotation: excessive    Scapular Mobility beyond 90° FF   Scapular winging: minimal  Upward rotation: premature  Downward rotation: excessive    Joint Play   Joints within functional limits: C4, C5, C6, C7 and T1     Hypomobile: C1, C2 and C3, T2-T7    Strength/Myotome Testing   Cervical Spine     Left   Interossei strength (t1): 4+    Right   Interossei strength (t1): 4+    Left Shoulder     Planes of Motion   Flexion: 4  Abduction:  4  External rotation at 0°: 4  Internal rotation at 0°: 4    Isolated Muscles   Lower trapezius: 4   Middle trapezius: 4   Serratus anterior: 4    Right Shoulder     Planes of Motion   Flexion: 5   Abduction: 5   External rotation at 0°: 5   Internal rotation at 0°: 5     Isolated Muscles   Lower trapezius: 4+   Middle trapezius: 4+   Serratus anterior: 4+     Left Elbow   Flexion: 5  Extension: 5    Right Elbow   Flexion: 5  Extension: 5    Left Wrist/Hand   Wrist extension: 5  Thumb extension: 5    Right Wrist/Hand   Wrist extension: 5  Thumb extension: 5    Other Comments:  Increased tension of left median nerve    Assessment: During today's re-evaluation pt has shown improvements in tolerance to VOR, improved duration with balance exercises (with sway still present with all eye closes), and slight overall decreased symptom intensity in both concussion/vestibular symptom as well as shoulder and cervical  symptoms since previous evaluation. Though improvements were shown, pt remains to be limited with all strength and endurance of all left shoulder, rotator cuff, scapular stabilization, and postural musculature, increased tension in left median nerve, increased tension in cervical + thoracic paraspinals, UT, LS, and SCM, hypomobility of cervical and thoracic spine and left scapula, vestibular/oculomotor hypofunction, decreased tolerance to VOR and gaze stabilization exercises, and poor posture with forward head and rounded shoulders. Pt would benefit from continued skilled PT to address functional deficits assessed during today's re-evaluation. Pt tolerated treatment well. Patient exhibited good technique with therapeutic exercises and would benefit from continued PT    Goals- STG+LTG  Pt will demonstrate [place] with HEP to promote PT carry over and improve ability to manage symptoms independently once Discharged - met  Pt will demonstrate 20% improvement in FOTO score to increase ease with all ADLs and  "functional activities per PLOF.  - progressing  Pt will demonstrate 10 deg improvement in cervical AROM to increase tolerance to turning head when driving, and looking down when reading. - met  Pt will demonstrate 4+/5 in bilateral Ue/periscap strength to increase tolerance to lifting overhead and improve prolonged sitting - met  Pt will demonstrate ability to perform repeated overhead lifting at least 5Ibs without pain and no signs of compensation, or poor scapular movement to improve ease with putting away items in overhead shelves at work with min to no difficulty. - progressing  Pt will demonstrate completing proper pushing motion to improve tolerance to pushing boxes, opening doors etc. Without discomfort to improve tolerance to work activities. - progressing  Pt will be able to perform VOR 1 for 1+ minutes both horizontally and vertically, with no increase in symptoms. - progressing  Pt will be independent with HEP - met  Pt will not have an dizziness with end range rotation of either right or left.- met for today    Plan: Continue per plan of care.  Progress treatment as tolerated.       Precautions:         Date 11/25 11/29   11/4 11/8 11/11 11/15 11/18 11/22   Visit # 31 32   25 26 27 28 29 30   FOTO     nv   XXX     Re-eval                 Focus on NM control of Left scapula and posterior cervical musculature.     Manuals 11/25 11/29   11/4 11/8 11/11 11/15 11/18 11/22   T/S Mobe  PWK           UT/LS Str  PWK    PWK KR PWK +SCM     Scapular Mob  PWK           R Shldr PROM and STM         1st rib mob grade IV KR     Cupping        KR      CS gentle traction     NH 10 min PWK KR and MFR   KR and MFR / pec release     SOR  PWK   NH 3 min PWK KR  PWK KR     Median Nerve Flossing  PWK           T spine STM  PWK   NH PWK  PWK      Vestibular Testing      PWK smooth puriust saccades w. DK       Neuro Re-Ed 11/25 11/29   11/4 11/8 11/11 11/15 11/18 11/22   Pt Education  PWK           VOR 1 horizontal      3x30\"  " "3x30\"  3x30\"   VOR 1 Vertical      3x30\"   3x30\"  3x30\"   VOR 2 Horizontal             VOR 2 Vertical             Gaze stabilization      4 letters w/ rotation 10x  4 letters w/ rooation 10x  4 letters w/ rotation 10x   Ulnar Nerve Glide     30x 30x   Manual x 15     Median Nerve Glide  30x   30x 30x   Manual x 15     SLS              Tandem Stance  1' EC no foam 1' EC w/ foam        3x30\" EC on foam   Balance Beam             Biodex             Thoracic ext  5x10\" over foam roll         5\" x 10 over foam roll     Serratus press              Doorway Pec Str             Openbooks             SNAG ext      15x5\"        SNAG rot       15x5\"       UT/LS Str 3x30\" with towel  3x30\" ea    3x30\" L       Back              pball stretch          10x10\"     Deep Neck flex supine             Foam roll protocol        X15 3 x 20\" pec       Pain/postural ucation, dx edu, vestibular edu             Ther Ex 11/25 11/29   11/4 11/8 11/11 11/15 11/18 11/22   UBE  3'/3'   3'/3'  3'post 3'/3' 3'/ 3' 3'/3'   Scap stretch              No moneys             T/S ext, rot     15x5\" ea over foam roller   15x 5\" ea over FR     Tband rows, ext         nv 20x ea 10#   B/l ER             B/l IR             D2 Flexion             Pball roll out  5x10\" 3 way  5x5\" 3-way      10x10\"  10x10\"  10x3\" ea 3-way   Quad Ts, Ys, Ws as able         Prone x 20 ea     Prone I, T, Y 20x ea         Prone x 30 ea     Shoulder SHurgs             Scap retraction             Ther Activity      11/8 11/11 11/18                              Gait Training                                       Modalities     11/4 11/8 11/18    MHP     10' 10'                                       "

## 2024-12-04 ENCOUNTER — OFFICE VISIT (OUTPATIENT)
Dept: PHYSICAL THERAPY | Facility: CLINIC | Age: 39
End: 2024-12-04
Payer: COMMERCIAL

## 2024-12-04 DIAGNOSIS — M54.6 CHRONIC LEFT-SIDED THORACIC BACK PAIN: ICD-10-CM

## 2024-12-04 DIAGNOSIS — H83.2X3 VESTIBULAR HYPOFUNCTION OF BOTH EARS: Primary | ICD-10-CM

## 2024-12-04 DIAGNOSIS — M54.2 NECK PAIN, CHRONIC: ICD-10-CM

## 2024-12-04 DIAGNOSIS — G89.29 CHRONIC LEFT-SIDED THORACIC BACK PAIN: ICD-10-CM

## 2024-12-04 DIAGNOSIS — G89.29 NECK PAIN, CHRONIC: ICD-10-CM

## 2024-12-04 PROCEDURE — 97140 MANUAL THERAPY 1/> REGIONS: CPT

## 2024-12-04 PROCEDURE — 97110 THERAPEUTIC EXERCISES: CPT

## 2024-12-04 PROCEDURE — 97112 NEUROMUSCULAR REEDUCATION: CPT

## 2024-12-04 PROCEDURE — 97530 THERAPEUTIC ACTIVITIES: CPT

## 2024-12-04 NOTE — PROGRESS NOTES
"Daily Note     Today's date: 2024  Patient name: Elivra Grnat  : 1985  MRN: [id number]  Referring provider: Enrique Colmenares MD  Dx:   Encounter Diagnosis     ICD-10-CM    1. Vestibular hypofunction of both ears  H83.2X3       2. Neck pain, chronic  M54.2     G89.29       3. Chronic left-sided thoracic back pain  M54.6     G89.29                      Subjective: \" I have the scapular pain today\"       Objective: See treatment diary below      Assessment: Elvira presents to therapy with neck pain/ dizziness. Notes increased scap pain on arrival- added MFD to lower border with release. Note increased winging present. Progressed scap activation to tolerance with increased activation and hold.  Cuing for reduced UT activation;compensation. Fatigue in the form of shaking noted with prolonged scap activation. Tolerated session without adverse effects. Recommend continued skilled therapy to improve overall strength and mobility for functional return with decreased compensation and pain.        Plan: Continue per plan of care.  Progress as tolerated      Precautions:         Date 11/25 11/29 12/4  11/4 11/8 11/11 11/15 11/18 11/22   Visit # 31 32 33  25 26 27 28 29 30   FOTO     nv   XXX     Re-eval                 Focus on NM control of Left scapula and posterior cervical musculature.     Manuals 11/25 11/29 12/4  11/4 11/8 11/11 11/15 11/18 11/22   T/S Mobe  PWK           UT/LS Str  PWK    PWK KR PWK +SCM     Scapular Mob  PWK MFD to scap lower border           R Shldr PROM and STM         1st rib mob grade IV KR     Cupping        KR      CS gentle traction     NH 10 min PWK KR and MFR   KR and MFR / pec release     SOR  PWK   NH 3 min PWK KR  PWK KR     Median Nerve Flossing  PWK           T spine STM  PWK   NH PWK  PWK      Vestibular Testing      PWK smooth puriust saccades w. DK       Neuro Re-Ed 11/25 11/29 12/4  11/4 11/8 11/11 11/15 11/18 11/22   Pt Education  PWK           VOR 1 horizontal      " "3x30\"  3x30\"  3x30\"   VOR 1 Vertical      3x30\"   3x30\"  3x30\"   VOR 2 Horizontal             VOR 2 Vertical             Gaze stabilization      4 letters w/ rotation 10x  4 letters w/ rooation 10x  4 letters w/ rotation 10x   Ulnar Nerve Glide     30x 30x   Manual x 15     Median Nerve Glide  30x   30x 30x   Manual x 15     SLS              Tandem Stance  1' EC no foam 1' EC w/ foam        3x30\" EC on foam   Balance Beam             Biodex             Thoracic ext  5x10\" over foam roll         5\" x 10 over foam roll     Serratus press              Doorway Pec Str             OTB   Y lift x 10          SNAG ext      15x5\"        SNAG rot       15x5\"       UT/LS Str 3x30\" with towel  3x30\" ea    3x30\" L       Back              Wall walks    GTB x 15       10x10\"     Deep Neck flex supine             Foam roll protocol        X15 3 x 20\" pec       Pain/postural ucation, dx edu, vestibular edu             Ther Ex 11/25 11/29 12/4  11/4 11/8 11/11 11/15 11/18 11/22   UBE  3'/3' 3'/3'  3'/3'  3'post 3'/3' 3'/ 3' 3'/3'   Scap stretch              No moneys/ horizontal abd   5\" x 30 GTB          T/S ext, rot     15x5\" ea over foam roller   15x 5\" ea over FR     Tband rows, ext         nv 20x ea 10#   B/l ER             B/l IR             D2 Flexion             Pball roll out  5x10\" 3 way  5x5\" 3-way  10x10\" 3 way with MFD     10x10\"  10x10\"  10x3\" ea 3-way   Quad Ts, Ys, Ws as able         Prone x 20 ea     Prone I, T, Y 20x ea         Prone x 30 ea     Shoulder SHurgs             Scap border stretch    5\" x 10 with MFD           Ther Activity   12/4 11/8 11/11 11/18    Patient education    KR scap winging                       Gait Training                                       Modalities     11/4 11/8 11/18    P     10' 10'                                         " nausea/vomiting

## 2024-12-06 ENCOUNTER — OFFICE VISIT (OUTPATIENT)
Dept: PHYSICAL THERAPY | Facility: CLINIC | Age: 39
End: 2024-12-06
Payer: COMMERCIAL

## 2024-12-06 ENCOUNTER — APPOINTMENT (OUTPATIENT)
Dept: PHYSICAL THERAPY | Facility: CLINIC | Age: 39
End: 2024-12-06
Payer: COMMERCIAL

## 2024-12-06 DIAGNOSIS — M54.6 CHRONIC LEFT-SIDED THORACIC BACK PAIN: ICD-10-CM

## 2024-12-06 DIAGNOSIS — G89.29 NECK PAIN, CHRONIC: ICD-10-CM

## 2024-12-06 DIAGNOSIS — H83.2X3 VESTIBULAR HYPOFUNCTION OF BOTH EARS: Primary | ICD-10-CM

## 2024-12-06 DIAGNOSIS — G89.29 CHRONIC LEFT-SIDED THORACIC BACK PAIN: ICD-10-CM

## 2024-12-06 DIAGNOSIS — M54.2 NECK PAIN, CHRONIC: ICD-10-CM

## 2024-12-06 PROCEDURE — 97110 THERAPEUTIC EXERCISES: CPT

## 2024-12-06 PROCEDURE — 97140 MANUAL THERAPY 1/> REGIONS: CPT

## 2024-12-06 NOTE — PROGRESS NOTES
Daily Note     Today's date: 2024  Patient name: Elvira Grant  : 1985  MRN: [id number]  Referring provider: Enrique Colmenares MD  Dx:   Encounter Diagnosis     ICD-10-CM    1. Vestibular hypofunction of both ears  H83.2X3       2. Neck pain, chronic  M54.2     G89.29       3. Chronic left-sided thoracic back pain  M54.6     G89.29             Start Time: 0815  Stop Time: 09  Total time in clinic (min): 47 minutes    Subjective: Patient reports increased pain levels today since this past Wednesday. He ranks pain today 9/10 at his left scapular area.      Objective: See treatment diary below      Assessment: Patient tolerated treatment well. Per subjective, patient noting increased periscapular pain today. Focused session today more on manuals, rom and light flexibility. Patient reports positive response to exercises, noting decreased pain intensity following. Recommend continued skilled therapy to improve overall strength and mobility for functional return with decreased compensation and pain.        Plan: Continue per plan of care.  Progress as tolerated      Precautions:         Date 11/25 11/29 12/4 12/6 11/4 11/8 11/11 11/15 11/18 11/22   Visit # 31 32 33 34 25 26 27 28 29 30   FOTO     nv   XXX     Re-eval                 Focus on NM control of Left scapula and posterior cervical musculature.     Manuals 11/25 11/29 12/4 12/6 11/4 11/8 11/11 11/15 11/18 11/22   T/S Mobe  PWK           UT/LS Str  PWK  TE  PWK KR PWK +SCM     Scapular Mob  PWK MFD to scap lower border  TE          R Shldr PROM and STM         1st rib mob grade IV KR     Cupping        KR      CS gentle traction     NH 10 min PWK KR and MFR   KR and MFR / pec release     SOR  PWK   NH 3 min PWK KR  PWK KR     Median Nerve Flossing  PWK           T spine STM  PWK   NH PWK  PWK      Vestibular Testing      PWK smooth puriust saccades w. DK       Neuro Re-Ed 11/25 11/29 12/4 12/6 11/4 11/8 11/11 11/15 11/18 11/22   Pt Education   "PWK           VOR 1 horizontal      3x30\"  3x30\"  3x30\"   VOR 1 Vertical      3x30\"   3x30\"  3x30\"   VOR 2 Horizontal             VOR 2 Vertical             Gaze stabilization      4 letters w/ rotation 10x  4 letters w/ rooation 10x  4 letters w/ rotation 10x   Ulnar Nerve Glide     30x 30x   Manual x 15     Median Nerve Glide  30x   30x 30x   Manual x 15     SLS              Tandem Stance  1' EC no foam 1' EC w/ foam        3x30\" EC on foam   Balance Beam             Biodex             Thoracic ext  5x10\" over foam roll         5\" x 10 over foam roll     Serratus press              Doorway Pec Str             OTB   Y lift x 10 Y lift x10 arom         SNAG ext      15x5\"        SNAG rot       15x5\"       UT/LS Str 3x30\" with towel  3x30\" ea    3x30\" L       Back              Wall walks    GTB x 15       10x10\"     Deep Neck flex supine             Foam roll protocol        X15 3 x 20\" pec       Pain/postural ucation, dx edu, vestibular edu             Ther Ex 11/25 11/29 12/4 12/6 11/4 11/8 11/11 11/15 11/18 11/22   UBE  3'/3' 3'/3' 3'/3' 3'/3'  3'post 3'/3' 3'/ 3' 3'/3'   Scap stretch              No moneys/ horizontal abd   5\" x 30 GTB 5\" x 10 GTB          T/S ext, rot     15x5\" ea over foam roller   15x 5\" ea over FR     Tband rows, ext         nv 20x ea 10#   B/l ER             B/l IR             D2 Flexion             Pball roll out  5x10\" 3 way  5x5\" 3-way  10x10\" 3 way with MFD  10x10\" 3way     10x10\"  10x10\"  10x3\" ea 3-way   Quad Ts, Ys, Ws as able         Prone x 20 ea     Prone I, T, Y 20x ea    20x prone T      Prone x 30 ea     Shoulder SHurgs             Scap border stretch    5\" x 10 with MFD  Hold p!         Ther Activity   12/4 11/8 11/11 11/18    Patient education    KR scap winging                       Gait Training                                       Modalities     11/4 11/8 11/18    MHP     10' 10'                                         "

## 2024-12-11 ENCOUNTER — OFFICE VISIT (OUTPATIENT)
Dept: PHYSICAL THERAPY | Facility: CLINIC | Age: 39
End: 2024-12-11
Payer: COMMERCIAL

## 2024-12-11 DIAGNOSIS — G89.29 NECK PAIN, CHRONIC: Primary | ICD-10-CM

## 2024-12-11 DIAGNOSIS — M54.2 NECK PAIN, CHRONIC: Primary | ICD-10-CM

## 2024-12-11 DIAGNOSIS — G89.29 CHRONIC LEFT-SIDED THORACIC BACK PAIN: ICD-10-CM

## 2024-12-11 DIAGNOSIS — M54.6 CHRONIC LEFT-SIDED THORACIC BACK PAIN: ICD-10-CM

## 2024-12-11 PROCEDURE — 97140 MANUAL THERAPY 1/> REGIONS: CPT

## 2024-12-11 PROCEDURE — 97110 THERAPEUTIC EXERCISES: CPT

## 2024-12-11 PROCEDURE — 97010 HOT OR COLD PACKS THERAPY: CPT

## 2024-12-11 NOTE — PROGRESS NOTES
Daily Note     Today's date: 2024  Patient name: Elvira Grant  : 1985  MRN: [id number]  Referring provider: Enrique Colmenares MD  Dx:   Encounter Diagnosis     ICD-10-CM    1. Neck pain, chronic  M54.2     G89.29       2. Chronic left-sided thoracic back pain  M54.6     G89.29           Start Time: 1700  Stop Time: 1750  Total time in clinic (min): 50 minutes    Subjective: Pt reports that he is having strong, /10 pain in his left scapula coming into today's session. He also noted that he is currently not taking his gabapentin (sine ) due to being unable to find it. He noted that since stopping gabapentin he has not been any more foggy but has noted forgetful ness still. Pt noted he has a follow up appointment for symptoms next year..      Objective: See treatment diary below      Assessment: Pt tolerated treatment well. After UBE was performed as warm up, several manual STM and stretching was performed to attempt to reduce tension and overall symptom intensity in thoracic and scapular musculature noted coming into today's session. There was increased tension in all thoracic and related musculature felt with STM, so MHP was utilized at attempt to assist with symptom reduction. Pt responded well to manual interventions + MHP with slight reduction in overall symptom intensity post manual interventions. Remainder of PT session was focused on exercises to help improve the strength, endurance, and neuromuscular control of scapular and postural stabilization musculature. Patient exhibited good technique with therapeutic exercises and would benefit from continued PT      Plan: Continue per plan of care.  Progress treatment as tolerated.       Precautions:         Date    Visit # 31 32 33 34 35    29 30   FOTO             Re-eval                 Focus on NM control of Left scapula and posterior cervical musculature.     Manuals     " 11/18 11/22   T/S Mobe  PWK   PWK        UT/LS Str  PWK  TE         Scapular Mob  PWK MFD to scap lower border  TE  PWK        R Shldr PROM and STM     PWK L    1st rib mob grade IV KR     Cupping              CS gentle traction         KR and MFR / pec release     SOR  PWK       KR     Median Nerve Flossing  PWK           T spine STM  PWK   PWK        Vestibular Testing             Neuro Re-Ed 11/25 11/29 12/4 12/6 12/11 11/18 11/22   Pt Education  PWK           VOR 1 horizontal          3x30\"   VOR 1 Vertical          3x30\"   VOR 2 Horizontal             VOR 2 Vertical             Gaze stabilization          4 letters w/ rotation 10x   Ulnar Nerve Glide         Manual x 15     Median Nerve Glide  30x       Manual x 15     SLS              Tandem Stance  1' EC no foam 1' EC w/ foam        3x30\" EC on foam   Balance Beam             Biodex             Thoracic ext  5x10\" over foam roll         5\" x 10 over foam roll     Serratus press              Doorway Pec Str             OTB   Y lift x 10 Y lift x10 arom         SNAG ext             SNAG rot              UT/LS Str 3x30\" with towel  3x30\" ea           Back              Wall walks    GTB x 15       10x10\"     Deep Neck flex supine             Foam roll protocol              Pain/postural ucation, dx edu, vestibular edu             Ther Ex 11/25 11/29 12/4 12/6 12/11 11/18 11/22   UBE  3'/3' 3'/3' 3'/3' 3'/3'    3'/ 3' 3'/3'   Scap stretch              No moneys/ horizontal abd   5\" x 30 GTB 5\" x 10 GTB          T/S ext, rot             Tband rows, ext     15x ea at [place] 12#    nv 20x ea 10#   B/l ER             B/l IR             D2 Flexion             Pball roll out  5x10\" 3 way  5x5\" 3-way  10x10\" 3 way with MFD  10x10\" 3way   10x3\" 3-way    10x10\"  10x3\" ea 3-way   Quad Ts, Ys, Ws as able         Prone x 20 ea     Prone I, T, Y 20x ea    20x prone T  15x 2# SB    Prone x 30 ea     Shoulder SHurgs             Scap border stretch    5\" x 10 with " SUNIL  Hold p!         Ther Activity   12/4 12/11 11/18    Patient education    KR scap winging                       Gait Training     12/11                                  Modalities     12/11 11/18    MHP     10' T-spine

## 2024-12-13 ENCOUNTER — OFFICE VISIT (OUTPATIENT)
Dept: PHYSICAL THERAPY | Facility: CLINIC | Age: 39
End: 2024-12-13
Payer: COMMERCIAL

## 2024-12-13 DIAGNOSIS — M54.6 CHRONIC LEFT-SIDED THORACIC BACK PAIN: ICD-10-CM

## 2024-12-13 DIAGNOSIS — G89.29 CHRONIC LEFT-SIDED THORACIC BACK PAIN: ICD-10-CM

## 2024-12-13 DIAGNOSIS — H83.2X3 VESTIBULAR HYPOFUNCTION OF BOTH EARS: ICD-10-CM

## 2024-12-13 DIAGNOSIS — G89.29 NECK PAIN, CHRONIC: Primary | ICD-10-CM

## 2024-12-13 DIAGNOSIS — M54.2 NECK PAIN, CHRONIC: Primary | ICD-10-CM

## 2024-12-13 PROCEDURE — 97110 THERAPEUTIC EXERCISES: CPT

## 2024-12-13 PROCEDURE — 97140 MANUAL THERAPY 1/> REGIONS: CPT

## 2024-12-13 PROCEDURE — 97010 HOT OR COLD PACKS THERAPY: CPT

## 2024-12-13 NOTE — PROGRESS NOTES
Daily Note     Today's date: 2024  Patient name: Elvira Grant  : 1985  MRN: [id number]  Referring provider: Enrique Colmenares MD  Dx:   Encounter Diagnosis     ICD-10-CM    1. Neck pain, chronic  M54.2     G89.29       2. Chronic left-sided thoracic back pain  M54.6     G89.29       3. Vestibular hypofunction of both ears  H83.2X3           Start Time: 830  Stop Time: 925  Total time in clinic (min): 55 minutes    Subjective: Pt reports that he felt much better after last PT session, with reduced tension in full thoracic spine, shoulder, and scapula region. Pt also noted decreased overall symptom intensity compared to previous session.      Objective: See treatment diary below      Assessment: Pt tolerated treatment well. PT session was kept at same intensity as previous visit due to pt reporting successful reduction in overall symptom intensity and decreased tension throughout thoracic spine, shoulder, and scapular regions. Pt continued to show positive signs of improvements throughout full duration of PT session today. Pt remains challenged with several strengthening exercises, having slight increased pain, but noted overall he has improved tolerance and performance with each exercise. Patient exhibited good technique with therapeutic exercises and would benefit from continued PT      Plan: Continue per plan of care.  Progress treatment as tolerated.       Precautions:         Date    Visit # 31 32 33 34 35 36   29 30   FOTO             Re-eval                 Focus on NM control of Left scapula and posterior cervical musculature.     Manuals    T/S Mobe  PWK   PWK PWK       UT/LS Str  PWK  TE         Scapular Mob  PWK MFD to scap lower border  TE  PWK PWK       R Shldr PROM and STM     PWK L PKW L    1st rib mob grade IV KR     Cupping              CS gentle traction         KR and MFR / pec  "release     SOR  PWK       KR     Median Nerve Flossing  PWK           T spine STM  PWK   PWK PWK       Vestibular Testing             Neuro Re-Ed 11/25 11/29 12/4 12/6 12/11 12/13 11/18 11/22   Pt Education  PWK           VOR 1 horizontal          3x30\"   VOR 1 Vertical          3x30\"   VOR 2 Horizontal             VOR 2 Vertical             Gaze stabilization          4 letters w/ rotation 10x   Ulnar Nerve Glide         Manual x 15     Median Nerve Glide  30x       Manual x 15     SLS              Tandem Stance  1' EC no foam 1' EC w/ foam        3x30\" EC on foam   Balance Beam             Biodex             Thoracic ext  5x10\" over foam roll         5\" x 10 over foam roll     Serratus press              Doorway Pec Str      2x30\"       OTB   Y lift x 10 Y lift x10 arom         SNAG ext             SNAG rot              UT/LS Str 3x30\" with towel  3x30\" ea           Back              Wall walks    GTB x 15       10x10\"     Deep Neck flex supine             Foam roll protocol              Pain/postural ucation, dx edu, vestibular edu             Ther Ex 11/25 11/29 12/4 12/6 12/11 12/13 11/18 11/22   UBE  3'/3' 3'/3' 3'/3' 3'/3' 3'/3'   3'/ 3' 3'/3'   Scap stretch              No moneys/ horizontal abd   5\" x 30 GTB 5\" x 10 GTB          T/S ext, rot             Tband rows, ext     15x ea at [place] 12# 15x ea at [place] 12#   nv 20x ea 10#   B/l ER             B/l IR             D2 Flexion             Pball roll out  5x10\" 3 way  5x5\" 3-way  10x10\" 3 way with MFD  10x10\" 3way   10x3\" 3-way 10x3\" 3-way    10x10\"  10x3\" ea 3-way   Quad Ts, Ys, Ws as able         Prone x 20 ea     Prone I, T, Y 20x ea    20x prone T  15x 2# SB 15x ea 2# SB   Prone x 30 ea     Shoulder SHurgs             Scap border stretch    5\" x 10 with MFD  Hold p!         Ther Activity   12/4 12/11 12/113 11/18    Patient education    KR scap winging                       Gait Training     12/11 12/13                               "   Modalities     12/11 12/13 11/18    P     10' T-spine 10' T-spine

## 2024-12-16 ENCOUNTER — OFFICE VISIT (OUTPATIENT)
Dept: PHYSICAL THERAPY | Facility: CLINIC | Age: 39
End: 2024-12-16
Payer: COMMERCIAL

## 2024-12-16 DIAGNOSIS — G89.29 NECK PAIN, CHRONIC: Primary | ICD-10-CM

## 2024-12-16 DIAGNOSIS — M54.6 CHRONIC LEFT-SIDED THORACIC BACK PAIN: ICD-10-CM

## 2024-12-16 DIAGNOSIS — H83.2X3 VESTIBULAR HYPOFUNCTION OF BOTH EARS: ICD-10-CM

## 2024-12-16 DIAGNOSIS — G89.29 CHRONIC LEFT-SIDED THORACIC BACK PAIN: ICD-10-CM

## 2024-12-16 DIAGNOSIS — M54.2 NECK PAIN, CHRONIC: Primary | ICD-10-CM

## 2024-12-16 PROCEDURE — 97140 MANUAL THERAPY 1/> REGIONS: CPT

## 2024-12-16 PROCEDURE — 97110 THERAPEUTIC EXERCISES: CPT

## 2024-12-16 PROCEDURE — 97112 NEUROMUSCULAR REEDUCATION: CPT

## 2024-12-16 NOTE — PROGRESS NOTES
"Daily Note     Today's date: 2024  Patient name: Elvira Grant  : 1985  MRN: [id number]  Referring provider: Enrique Colmenares MD  Dx:   Encounter Diagnosis     ICD-10-CM    1. Neck pain, chronic  M54.2     G89.29       2. Chronic left-sided thoracic back pain  M54.6     G89.29       3. Vestibular hypofunction of both ears  H83.2X3                        Subjective: \" My shoulder blade is hurting \" \" I stayed out of work today cause of pain\" \" the relief when I am here lasts til the next day\"- pt 7 min late accommodated        Objective: See treatment diary below      Assessment: Elvira presents to therapy with neck pain./ dizziness. Arrived with increased scap pain today. Added MFR to med border and inf border noting increased restriction throughout- improved mobility post/. Increased repetitions for scap exercises focusing on neuro control and improved activation. Session shortened patient got sick and left early. Tolerated session without adverse effects. Recommend continued skilled therapy to improve overall strength and mobility for functional return with decreased compensation and pain.        Plan: Continue per plan of care.  Progress treatment as tolerated.       Precautions:         Date    Visit # 31 32 33 34 35 36 37  29 30   FOTO             Re-eval                 Focus on NM control of Left scapula and posterior cervical musculature.     Manuals    T/S Mobe  PWK   PWK PWK       UT/LS Str  PWK  TE         Scapular Mob  PWK MFD to scap lower border  TE  PWK PWK       R Shldr PROM and STM     PWK L PKW L    1st rib mob grade IV KR     Cupping        KR scap border       CS gentle traction         KR and MFR / pec release     SOR  PWK       KR     Median Nerve Flossing  PWK           T spine STM  PWK   PWK PWK       Vestibular Testing             Neuro Re-Ed  " "12/13 12/16 11/18 11/22   Pt Education  PWK           VOR 1 horizontal          3x30\"   VOR 1 Vertical          3x30\"   VOR 2 Horizontal             VOR 2 Vertical             Gaze stabilization          4 letters w/ rotation 10x   Ulnar Nerve Glide         Manual x 15     Median Nerve Glide  30x       Manual x 15     SLS              Tandem Stance  1' EC no foam 1' EC w/ foam        3x30\" EC on foam   Balance Beam             Biodex             Thoracic ext  5x10\" over foam roll         5\" x 10 over foam roll     Serratus press              Doorway Pec Str      2x30\"       OTB   Y lift x 10 Y lift x10 arom   Y lift x10 OTB       SNAG ext             SNAG rot              UT/LS Str 3x30\" with towel  3x30\" ea           Back              Wall walks    GTB x 15       10x10\"     Deep Neck flex supine             Foam roll protocol              Pain/postural ucation, dx edu, vestibular edu     ;               Ther Ex 11/25 11/29 12/4 12/6 12/11 12/13 12/16 11/18 11/22   UBE  3'/3' 3'/3' 3'/3' 3'/3' 3'/3'   3'/ 3' 3'/3'   Scap stretch              No moneys/ horizontal abd   5\" x 30 GTB 5\" x 10 GTB    OTB x 15       T/S ext, rot             Tband rows, ext     15x ea at [place] 12# 15x ea at [place] 12# 20 x ea 12# [place]   nv 20x ea 10#   B/l ER             B/l IR             D2 Flexion             Pball roll out  5x10\" 3 way  5x5\" 3-way  10x10\" 3 way with MFD  10x10\" 3way   10x3\" 3-way 10x3\" 3-way  10x3\" 3-way w/ MFD  10x10\"  10x3\" ea 3-way   Quad Ts, Ys, Ws as able         Prone x 20 ea     Prone I, T, Y 20x ea    20x prone T  15x 2# SB 15x ea 2# SB   Prone x 30 ea     Shoulder SHurgs             Scap border stretch    5\" x 10 with MFD  Hold p!   5x10\" with MFD       Ther Activity   12/4 12/11 12/113 12/16 11/18    Patient education    KR scap winging                       Gait Training     12/11 12/13 12/16                                Modalities     12/11 12/13 12/16 11/18    [facility]     10' T-spine 10' " T-spine

## 2024-12-19 ENCOUNTER — APPOINTMENT (OUTPATIENT)
Dept: PHYSICAL THERAPY | Facility: CLINIC | Age: 39
End: 2024-12-19
Payer: COMMERCIAL

## 2024-12-19 NOTE — PROGRESS NOTES
"Daily Note     Today's date: 2024  Patient name: Elvira Grant  : 1985  MRN: [id number]  Referring provider: Enrique Colmenares MD  Dx:   No diagnosis found.                   Subjective: \" My       Objective: See treatment diary below      Assessment: Elvira presents to therapy with neck pain./ dizziness.  Tolerated session without adverse effects. Recommend continued skilled therapy to improve overall strength and mobility for functional return with decreased compensation and pain.        Plan: Continue per plan of care.  Progress treatment as tolerated.       Precautions:         Date    Visit # 31 32 33 34 35 36 37  29 30   FOTO             Re-eval                 Focus on NM control of Left scapula and posterior cervical musculature.     Manuals    T/S Mobe  PWK   PWK PWK       UT/LS Str  PWK  TE         Scapular Mob  PWK MFD to scap lower border  TE  PWK PWK       R Shldr PROM and STM     PWK L PKW L    1st rib mob grade IV KR     Cupping        KR scap border       CS gentle traction         KR and MFR / pec release     SOR  PWK       KR     Median Nerve Flossing  PWK           T spine STM  PWK   PWK PWK       Vestibular Testing             Neuro Re-Ed    Pt Education  PWK           VOR 1 horizontal          3x30\"   VOR 1 Vertical          3x30\"   VOR 2 Horizontal             VOR 2 Vertical             Gaze stabilization          4 letters w/ rotation 10x   Ulnar Nerve Glide         Manual x 15     Median Nerve Glide  30x       Manual x 15     SLS              Tandem Stance  1' EC no foam 1' EC w/ foam        3x30\" EC on foam   Balance Beam             Biodex             Thoracic ext  5x10\" over foam roll         5\" x 10 over foam roll     Serratus press              Doorway Pec Str      2x30\"       OTB   Y lift x 10 Y lift " "x10 arom   Y lift x10 OTB       SNAG ext             SNAG rot              UT/LS Str 3x30\" with towel  3x30\" ea           Back              Wall walks    GTB x 15       10x10\"     Deep Neck flex supine             Foam roll protocol              Pain/postural ucation, dx edu, vestibular edu     ;               Ther Ex 11/25 11/29 12/4 12/6 12/11 12/13 12/16 12/19 11/18 11/22   UBE  3'/3' 3'/3' 3'/3' 3'/3' 3'/3'   3'/ 3' 3'/3'   Scap stretch              No moneys/ horizontal abd   5\" x 30 GTB 5\" x 10 GTB    OTB x 15       T/S ext, rot             Tband rows, ext     15x ea at [place] 12# 15x ea at [place] 12# 20 x ea 12# [place]   nv 20x ea 10#   B/l ER             B/l IR             D2 Flexion             Pball roll out  5x10\" 3 way  5x5\" 3-way  10x10\" 3 way with MFD  10x10\" 3way   10x3\" 3-way 10x3\" 3-way  10x3\" 3-way w/ MFD  10x10\"  10x3\" ea 3-way   Quad Ts, Ys, Ws as able         Prone x 20 ea     Prone I, T, Y 20x ea    20x prone T  15x 2# SB 15x ea 2# SB   Prone x 30 ea     Shoulder SHurgs             Scap border stretch    5\" x 10 with MFD  Hold p!   5x10\" with MFD       Ther Activity   12/4 12/11 12/113 12/16 12/19 11/18    Patient education    KR scap winging                       Gait Training     12/11 12/13 12/16 12/19                               Modalities     12/11 12/13 12/16 12/19 11/18    MHP     10' T-spine 10' T-spine                                             "

## 2024-12-23 ENCOUNTER — OFFICE VISIT (OUTPATIENT)
Dept: PHYSICAL THERAPY | Facility: CLINIC | Age: 39
End: 2024-12-23
Payer: COMMERCIAL

## 2024-12-23 DIAGNOSIS — G89.29 NECK PAIN, CHRONIC: Primary | ICD-10-CM

## 2024-12-23 DIAGNOSIS — H83.2X3 VESTIBULAR HYPOFUNCTION OF BOTH EARS: ICD-10-CM

## 2024-12-23 DIAGNOSIS — M54.6 CHRONIC LEFT-SIDED THORACIC BACK PAIN: ICD-10-CM

## 2024-12-23 DIAGNOSIS — G89.29 CHRONIC LEFT-SIDED THORACIC BACK PAIN: ICD-10-CM

## 2024-12-23 DIAGNOSIS — M54.2 NECK PAIN, CHRONIC: Primary | ICD-10-CM

## 2024-12-23 PROCEDURE — 97140 MANUAL THERAPY 1/> REGIONS: CPT

## 2024-12-23 PROCEDURE — 97110 THERAPEUTIC EXERCISES: CPT

## 2024-12-23 PROCEDURE — 97112 NEUROMUSCULAR REEDUCATION: CPT

## 2024-12-23 PROCEDURE — 97530 THERAPEUTIC ACTIVITIES: CPT

## 2024-12-23 NOTE — PROGRESS NOTES
"Daily Note     Today's date: 2024  Patient name: Elvira Grant  : 1985  MRN: [id number]  Referring provider: Enrique Colmenares MD  Dx:   Encounter Diagnosis     ICD-10-CM    1. Neck pain, chronic  M54.2     G89.29       2. Chronic left-sided thoracic back pain  M54.6     G89.29       3. Vestibular hypofunction of both ears  H83.2X3                          Subjective: \" I still have pain in that shoulder blade spot but less than before\"       Objective: See treatment diary below      Assessment: Elvira presents to therapy with neck pain./ dizziness. Increased scap pain upon arrival today- focused on manual release with MFD to tolerance.  Overuse of UT noted throughout overhead movement. Fatigue with prolonged activation. Increased inflammation noted under scap border post MFD. Tolerated session without adverse effects. Recommend continued skilled therapy to improve overall strength and mobility for functional return with decreased compensation and pain.        Plan: Continue per plan of care.  Progress treatment as tolerated.       Precautions:         Date    Visit # 31 32 33 34 35 36 [phone] 38 29 30   FOTO             Re-eval                 Focus on NM control of Left scapula and posterior cervical musculature.     Manuals    T/S Mobe  PWK   PWK PWK       UT/LS Str  PWK  TE         Scapular Mob  PWK MFD to scap lower border  TE  PWK PWK       R Shldr PROM and STM     PWK L PKW L    1st rib mob grade IV KR     Cupping        KR scap border  KR and MFR / pec release KR and MFR / pec release      CS gentle traction         KR and MFR / pec release     SOR  PWK       KR     Median Nerve Flossing  PWK           T spine STM  PWK   PWK PWK       Vestibular Testing             Neuro Re-Ed    Pt Education  PWK           VOR 1 horizontal     " "     3x30\"   VOR 1 Vertical          3x30\"   VOR 2 Horizontal             VOR 2 Vertical             Gaze stabilization          4 letters w/ rotation 10x   Ulnar Nerve Glide         Manual x 15     Median Nerve Glide  30x       Manual x 15     SLS              Tandem Stance  1' EC no foam 1' EC w/ foam        3x30\" EC on foam   Balance Beam             Biodex             Thoracic ext  5x10\" over foam roll         5\" x 10 over foam roll     Serratus press              Doorway Pec Str      2x30\"       OTB   Y lift x 10 Y lift x10 arom   Y lift x10 OTB       SNAG ext             SNAG rot              UT/LS Str 3x30\" with towel  3x30\" ea           Back              Wall walks    GTB x 15      GTB x 15  10x10\"     Deep Neck flex supine             Foam roll protocol              Pain/postural ucation, dx edu, vestibular edu     ;               Ther Ex 11/25 11/29 12/4 12/6 12/11 12/13 12/16 12/23 11/18 11/22   UBE  3'/3' 3'/3' 3'/3' 3'/3' 3'/3'   3'/ 3' 3'/3'   Scap stretch              No moneys/ horizontal abd   5\" x 30 GTB 5\" x 10 GTB    OTB x 15  GTB x 15 with MFD      T/S ext, rot             Tband rows, ext     15x ea at [place] 12# 15x ea at [place] 12# 20 x ea 12# [place]  20 x ea 12# [place]  nv 20x ea 10#   B/l ER             B/l IR             D2 Flexion             Pball roll out  5x10\" 3 way  5x5\" 3-way  10x10\" 3 way with MFD  10x10\" 3way   10x3\" 3-way 10x3\" 3-way  10x3\" 3-way w/ MFD 10x3\" 3-way w/ MFD 10x10\"  10x3\" ea 3-way   Quad Ts, Ys, Ws as able         Prone x 20 ea     Prone I, T, Y 20x ea    20x prone T  15x 2# SB 15x ea 2# SB  With MFD 2# x 15 ea  Prone x 30 ea     Shoulder SHurgs             Scap border stretch    5\" x 10 with MFD  Hold p!   5x10\" with MFD       Ther Activity   12/4 12/11 12/113 12/16 12/23 11/18    Patient education    KR scap winging     KR                  Gait Training     12/11 12/13 12/16 12/23                               Modalities     12/11 12/13 12/16 12/23 11/18  "   MHP     10' T-spine 10' T-spine

## 2024-12-27 ENCOUNTER — OFFICE VISIT (OUTPATIENT)
Dept: PHYSICAL THERAPY | Facility: CLINIC | Age: 39
End: 2024-12-27
Payer: COMMERCIAL

## 2024-12-27 DIAGNOSIS — G89.29 CHRONIC LEFT-SIDED THORACIC BACK PAIN: ICD-10-CM

## 2024-12-27 DIAGNOSIS — M54.6 CHRONIC LEFT-SIDED THORACIC BACK PAIN: ICD-10-CM

## 2024-12-27 DIAGNOSIS — M54.2 NECK PAIN, CHRONIC: Primary | ICD-10-CM

## 2024-12-27 DIAGNOSIS — G89.29 NECK PAIN, CHRONIC: Primary | ICD-10-CM

## 2024-12-27 PROCEDURE — 97010 HOT OR COLD PACKS THERAPY: CPT

## 2024-12-27 PROCEDURE — 97140 MANUAL THERAPY 1/> REGIONS: CPT

## 2024-12-27 PROCEDURE — 97110 THERAPEUTIC EXERCISES: CPT

## 2024-12-27 NOTE — PROGRESS NOTES
Daily Note     Today's date: 2024  Patient name: Elvira Grant  : 1985  MRN: [id number]  Referring provider: Enrique Colmenares MD  Dx:   Encounter Diagnosis     ICD-10-CM    1. Neck pain, chronic  M54.2     G89.29       2. Chronic left-sided thoracic back pain  M54.6     G89.29           Start Time: 0800  Stop Time: 0850  Total time in clinic (min): 50 minutes    Subjective: Pt reports that his shoulder/upper back is feeling better coming into today's session, nothing that he has not had to do much lifting over the last few days. Pt reports that his head has still been 'foggy' but has been feeling better overall.       Objective: See treatment diary below      Assessment: Pt tolerated treatment well. Pt responded well to both MHP and manual STM during today's session with reduced tension in all shoulder and thoracic musculature and overall symptom intensity. Progressed rows and extensions at [employer] machine from 12# last session to 14# during today's session. Progressed prone ITYs on SB form 2# dumbbells to 3# dumbbells. Pt was able to tolerate all progressed exercises well, being able to complete all sets and reps with proper form and appropriate levels of fatigue post session. Patient exhibited good technique with therapeutic exercises and would benefit from continued PT. Currently the vestibular hypofunction diagnosis is being put on hold due, to symptoms being non consistent with PT interventions and symptom intensity decreasing. Pt has follow up appointment with neurologist in March (earliest appointment time available)       Plan: Continue per plan of care.  Progress treatment as tolerated.       Precautions:         Date     Visit # 31 32 33 34 35 36 [phone]    FOTO      XXX       Re-eval                 Focus on NM control of Left scapula and posterior cervical musculature.     Manuals    "  T/S Mobe  PWK   PWK PWK   PWK    UT/LS Str  PWK  TE         Scapular Mob  PWK MFD to scap lower border  TE  PWK PWK   PWK    R Shldr PROM and STM     PWK L PKW L    PWK L    Cupping        KR scap border  KR and MFR / pec release KR and MFR / pec release      CS gentle traction             SOR  PWK           Median Nerve Flossing  PWK           T spine STM  PWK   PWK PWK   PWK    Vestibular Testing             Neuro Re-Ed 11/25 11/29 12/4 12/6 12/11 12/13 12/16 12/23 12/27    Pt Education  PWK           VOR 1 horizontal             VOR 1 Vertical             VOR 2 Horizontal             VOR 2 Vertical             Gaze stabilization             Ulnar Nerve [place]             Median Nerve Glide  30x           SLS              Tandem Stance  1' EC no foam 1' EC w/ foam           Balance Beam             Biodex             Thoracic ext  5x10\" over foam roll             Serratus press              Doorway Pec Str      2x30\"       OTB   Y lift x 10 Y lift x10 arom   Y lift x10 OTB       SNAG ext             SNAG rot              UT/LS Str 3x30\" with towel  3x30\" ea           Back              Wall walks    GTB x 15      GTB x 15      Deep Neck flex supine             Foam roll protocol              Pain/postural ucation, dx edu, vestibular edu     ;               Ther Ex 11/25 11/29 12/4 12/6 12/11 12/13 12/16 12/23 12/27    UBE  3'/3' 3'/3' 3'/3' 3'/3' 3'/3'   3'/3'    Scap stretch              No moneys/ horizontal abd   5\" x 30 GTB 5\" x 10 GTB    OTB x 15  GTB x 15 with MFD      T/S ext, rot             Tband rows, ext     15x ea at [place] 12# 15x ea at [place] 12# 20 x ea 12# [place]  20 x ea 12# marg[place]  15x ea 14#    B/l ER             B/l IR             D2 Flexion             Pball roll out  5x10\" 3 way  5x5\" 3-way  10x10\" 3 way with MFD  10x10\" 3way   10x3\" 3-way 10x3\" 3-way  10x3\" 3-way w/ MFD 10x3\" 3-way w/ MFD 10x 3\" 3-way    Quad Ts, Ys, Ws as able             Prone I, T, Y 20x ea    20x prone T  15x 2# " "SB 15x ea 2# SB  With MFD 2# x 15 ea  15x ea 3# SB    Shoulder SHurgs             Scap border stretch    5\" x 10 with MFD  Hold p!   5x10\" with MFD       Ther Activity   12/4 12/11 12/113 12/16 12/23 12/27    Patient education    KR scap winging     KR                  Gait Training     12/11 12/13 12/16 12/23 12/27                              Modalities     12/11 12/13 12/16 12/23 12/27    MHP     10' T-spine 10' T-spine   10' T-spine                                            "

## 2024-12-30 ENCOUNTER — OFFICE VISIT (OUTPATIENT)
Dept: PHYSICAL THERAPY | Facility: CLINIC | Age: 39
End: 2024-12-30
Payer: COMMERCIAL

## 2024-12-30 DIAGNOSIS — G89.29 CHRONIC LEFT-SIDED THORACIC BACK PAIN: ICD-10-CM

## 2024-12-30 DIAGNOSIS — M54.6 CHRONIC LEFT-SIDED THORACIC BACK PAIN: ICD-10-CM

## 2024-12-30 DIAGNOSIS — M54.2 NECK PAIN, CHRONIC: Primary | ICD-10-CM

## 2024-12-30 DIAGNOSIS — G89.29 NECK PAIN, CHRONIC: Primary | ICD-10-CM

## 2024-12-30 DIAGNOSIS — H83.2X3 VESTIBULAR HYPOFUNCTION OF BOTH EARS: ICD-10-CM

## 2024-12-30 PROCEDURE — 97112 NEUROMUSCULAR REEDUCATION: CPT

## 2024-12-30 PROCEDURE — 97110 THERAPEUTIC EXERCISES: CPT

## 2024-12-30 PROCEDURE — 97140 MANUAL THERAPY 1/> REGIONS: CPT

## 2024-12-30 NOTE — PROGRESS NOTES
"Daily Note     Today's date: 2024  Patient name: Elvira Grant  : 1985  MRN: [id number]  Referring provider: Enrique Colmenares MD  Dx:   Encounter Diagnosis     ICD-10-CM    1. Neck pain, chronic  M54.2     G89.29       2. Chronic left-sided thoracic back pain  M54.6     G89.29       3. Vestibular hypofunction of both ears  H83.2X3                        Subjective: \" I am having that shoulder pain again- I was good this week when I didn't work\"       Objective: See treatment diary below      Assessment: Elvira presents to therapy with neck/ scap pain. Note continued scap/ cervical restrictions. Added increased manual release to tolerance. Gross fatigue with out the scap muscles and activators. Continues to exhibit decreased endurance with prolonged post chain activation. Tolerated session without adverse effects. Recommend continued skilled therapy to improve overall strength and mobility for functional return with decreased compensation and pain.        Plan: Continue per plan of care.  Progress treatment as tolerated.       Precautions:         Date    Visit # 31 32 33 34 35 36 [phone]   FOTO      XXX       Re-eval                 Focus on NM control of Left scapula and posterior cervical musculature.     Manuals    T/S Mobe  PWK   PWK PWK   PWK    UT/LS Str  PWK  TE      KR    Scapular Mob  PWK MFD to scap lower border  TE  PWK PWK   PWK    R Shldr PROM and STM     PWK L PKW L    PWK L    Cupping        KR scap border  KR and MFR / pec release KR and MFR / pec release   KR scap    CS gentle traction             SOR  PWK           Median Nerve Flossing  PWK           T spine STM  PWK   PWK PWK   PWK KR and cervical    Vestibular Testing             Neuro Re-Ed    Pt Education  PWK           VOR 1 horizontal             VOR " "1 Vertical             VOR 2 Horizontal             VOR 2 Vertical             Gaze stabilization             Ulnar Nerve [place]             Median Nerve Glide  30x           SLS              Tandem Stance  1' EC no foam 1' EC w/ foam           Prone serratus           x20   Biodex             Thoracic ext  5x10\" over foam roll             Serratus press              Doorway Pec Str      2x30\"       OTB   Y lift x 10 Y lift x10 arom   Y lift x10 OTB       SNAG ext             SNAG rot              UT/LS Str 3x30\" with towel  3x30\" ea           Back              Wall walks    GTB x 15      GTB x 15      Deep Neck flex supine             Foam roll protocol              Pain/postural ucation, dx edu, vestibular edu     ;               Ther Ex 11/25 11/29 12/4 12/6 12/11 12/13 12/16 12/23 12/27 12/30   UBE  3'/3' 3'/3' 3'/3' 3'/3' 3'/3'   3'/3' 5'post    Scap stretch              No moneys/ horizontal abd   5\" x 30 GTB 5\" x 10 GTB    OTB x 15  GTB x 15 with MFD      T/S ext, rot             Tband rows, ext     15x ea at [facility] 12# 15x ea at margie 12# 20 x ea 12# margie  20 x ea 12# margie  15x ea 14#    B/l ER             B/l IR             D2 Flexion             Pball roll out  5x10\" 3 way  5x5\" 3-way  10x10\" 3 way with MFD  10x10\" 3way   10x3\" 3-way 10x3\" 3-way  10x3\" 3-way w/ MFD 10x3\" 3-way w/ MFD 10x 3\" 3-way 3 x 10'\" w/ MFd   Quad Ts, Ys, Ws as able             Prone I, T, Y 20x ea    20x prone T  15x 2# SB 15x ea 2# SB  With MFD 2# x 15 ea  15x ea 3# SB 20 ea with 2# ea   Shoulder SHurgs             Scap border stretch    5\" x 10 with MFD  Hold p!   5x10\" with MFD       Ther Activity   12/4 12/11 12/113 12/16 12/23 12/27 12/30   Patient education    KR scap winging     KR                  Gait Training     12/11 12/13 12/16 12/23 12/27 12/30                             Modalities     12/11 12/13 12/16 12/23 12/27 12/30   MHP     10' T-spine 10' T-spine   10' T-spine               "

## 2025-01-03 ENCOUNTER — OFFICE VISIT (OUTPATIENT)
Dept: PHYSICAL THERAPY | Facility: CLINIC | Age: 40
End: 2025-01-03
Payer: COMMERCIAL

## 2025-01-03 DIAGNOSIS — G89.29 CHRONIC LEFT-SIDED THORACIC BACK PAIN: ICD-10-CM

## 2025-01-03 DIAGNOSIS — M54.6 CHRONIC LEFT-SIDED THORACIC BACK PAIN: ICD-10-CM

## 2025-01-03 DIAGNOSIS — M54.2 NECK PAIN, CHRONIC: Primary | ICD-10-CM

## 2025-01-03 DIAGNOSIS — G89.29 NECK PAIN, CHRONIC: Primary | ICD-10-CM

## 2025-01-03 PROCEDURE — 97110 THERAPEUTIC EXERCISES: CPT

## 2025-01-03 PROCEDURE — 97112 NEUROMUSCULAR REEDUCATION: CPT

## 2025-01-03 PROCEDURE — 97140 MANUAL THERAPY 1/> REGIONS: CPT

## 2025-01-03 NOTE — PROGRESS NOTES
Daily Note     Today's date: 1/3/2025  Patient name: Elviar Grant  : 1985  MRN: [id number]  Referring provider: No ref. provider found  Dx:   Encounter Diagnosis     ICD-10-CM    1. Neck pain, chronic  M54.2     G89.29       2. Chronic left-sided thoracic back pain  M54.6     G89.29           Start Time: 0810  Stop Time: 0850  Total time in clinic (min): 40 minutes    Subjective: Pt reports that he is feeling  good overall, being able to rest over the holiday season and not using his arm as much. Pt also reported that he is going to his concussion MD next week, getting the appointment pushed up from March.       Objective: See treatment diary below      Assessment: Pt tolerated treatment well. Continued to focus on improving the total strength and endurance of shoulder, cervical, and postural musculature globally. Pt showed improved tolerance and performance to all exercises performed being able to complete with minimal need of rest breaks and no exacerbation of symptoms during or post session. Added wall clocks during today's session, which pt needed moderate cueing for form corrections, but once pt was cued, he was able to complete all remaining sets and reps with proper form. Patient exhibited good technique with therapeutic exercises and would benefit from continued PT      Plan: Continue per plan of care.  Progress treatment as tolerated.       Precautions:         Date 1/3   12/6 12/11 12/13 12/16 12/23 12/27 12/30   Visit # 41   34 35 36 37 [phone] 40   FOTO NV     XXX       Re-eval                 Focus on NM control of Left scapula and posterior cervical musculature.     Manuals 1/3   12/6 12/11 12/13 12/16 12/23 12/27 12/30   T/S Mobe PWK    PWK PWK   PWK    UT/LS Str    TE      KR    Scapular Mob PWK   TE  PWK PWK   PWK    R Shldr PROM and STM PWK L     PWK L PKW L    PWK L    Cupping        KR scap border  KR and MFR / pec release KR and MFR / pec release   KR scap    CS gentle traction        "      SOR             Median Nerve Flossing             T spine STM PWK    PWK PWK   PWK KR and cervical    Vestibular Testing             Neuro Re-Ed 1/3   12/6 12/11 12/13 12/16 12/23 12/27 12/30   Pt Education             VOR 1 horizontal             VOR 1 Vertical             VOR 2 Horizontal             VOR 2 Vertical             Gaze stabilization             Ulnar Nerve [place]             Median Nerve [place]             SLS              Tandem Stance             Prone serratus           x20   Biodex             Thoracic ext              Serratus press              Doorway Pec Str      2x30\"       OTB    Y lift x10 arom   Y lift x10 OTB       SNAG ext             SNAG rot              UT/LS Str             Back              Wall clocks 15x GTB            Wall walks  15x GTB       GTB x 15      Deep Neck flex supine             Foam roll protocol              Pain/postural ucation, dx edu, vestibular edu     ;               Ther Ex 1/3   12/6 12/11 12/13 12/16 12/23 12/27 12/30   UBE 3'/3'   3'/3' 3'/3' 3'/3'   3'/3' 5'post    Scap stretch              No moneys/ horizontal abd    5\" x 10 GTB    OTB x 15  GTB x 15 with MFD      T/S ext, rot             Tband rows, ext     15x ea at [place] 12# 15x ea at [place] 12# 20 x ea 12# jodie  20 x ea 12# jodie  15x ea 14#    B/l ER             B/l IR             D2 Flexion             Pball roll out  10x3\" 3-way    10x10\" 3way   10x3\" 3-way 10x3\" 3-way  10x3\" 3-way w/ MFD 10x3\" 3-way w/ MFD 10x 3\" 3-way 3 x 10'\" w/ MFd   Quad Ts, Ys, Ws as able             Prone I, T, Y 20x ea 3# SB   20x prone T  15x 2# SB 15x ea 2# SB  With MFD 2# x 15 ea  15x ea 3# SB 20 ea with 2# ea   Shoulder SHurgs             Scap border stretch     Hold p!   5x10\" with MFD       Ther Activity     12/11 12/113 12/16 12/23 12/27 12/30   Patient education         KR                  Gait Training     12/11 12/13 12/16 12/23 12/27 12/30                             Modalities     12/11 12/13 " 12/16 12/23 12/27 12/30   P     10' T-spine 10' T-spine   10' T-spine

## 2025-01-06 ENCOUNTER — OFFICE VISIT (OUTPATIENT)
Dept: PHYSICAL THERAPY | Facility: CLINIC | Age: 40
End: 2025-01-06
Payer: COMMERCIAL

## 2025-01-06 DIAGNOSIS — M54.6 CHRONIC LEFT-SIDED THORACIC BACK PAIN: ICD-10-CM

## 2025-01-06 DIAGNOSIS — M54.2 NECK PAIN, CHRONIC: Primary | ICD-10-CM

## 2025-01-06 DIAGNOSIS — G89.29 NECK PAIN, CHRONIC: Primary | ICD-10-CM

## 2025-01-06 DIAGNOSIS — G89.29 CHRONIC LEFT-SIDED THORACIC BACK PAIN: ICD-10-CM

## 2025-01-06 DIAGNOSIS — H83.2X3 VESTIBULAR HYPOFUNCTION OF BOTH EARS: ICD-10-CM

## 2025-01-06 PROCEDURE — 97140 MANUAL THERAPY 1/> REGIONS: CPT

## 2025-01-06 PROCEDURE — 97530 THERAPEUTIC ACTIVITIES: CPT

## 2025-01-06 PROCEDURE — 97110 THERAPEUTIC EXERCISES: CPT

## 2025-01-06 NOTE — PROGRESS NOTES
"Daily Note     Today's date: 2025  Patient name: Elvira Grant  : 1985  MRN: [id number]  Referring provider: No ref. provider found  Dx:   Encounter Diagnosis     ICD-10-CM    1. Neck pain, chronic  M54.2     G89.29       2. Chronic left-sided thoracic back pain  M54.6     G89.29       3. Vestibular hypofunction of both ears  H83.2X3                        Subjective: \" I am still having that scap pain- it is much worse with my job\"       Objective: See treatment diary below      Assessment: Elvira presents to therapy with neck pain. Noted increased inflammation throughout the scapborder- added IASTM to tolerance to facilitate continued release- noted improved mobility post. Continued increased winging in scap with extra cuing to facilitate reduction with activation. Gross fatigue in the lower trap and rhomboids with shaking observed. Tolerated session without adverse effects. Recommend continued skilled therapy to improve overall strength and mobility for functional return with decreased compensation and pain.        Plan: Continue per plan of care.  Progress treatment as tolerated.       Precautions:         Date 1/3 1/6  12/6 12/11 12/13 12/16 12/23 12/27 12/30   Visit # 41 42  34 35 36 37 38 39 40   FOTO NV X    XXX       Re-eval                 Focus on NM control of Left scapula and posterior cervical musculature.     Manuals 1/3 1/6  12/6 12/11 12/13 12/16 12/23 12/27 12/30   T/S Mobe PWK    PWK PWK   PWK    UT/LS Str    TE      KR    Scapular Mob PWK   TE  PWK PWK   PWK    R Shldr PROM and STM PWK L     PWK L PKW L    PWK L    Cupping   KR scap border      KR scap border  KR and MFR / pec release KR and MFR / pec release   KR scap    CS gentle traction             IASTM  KR scap border andL levator             Median Nerve Flossing             T spine STM PWK    PWK PWK   PWK KR and cervical    Vestibular Testing             Neuro Re-Ed 1/3 1/6  12/6 12/11 12/13 12/16 12/23 12/27 12/30   Pt " "Education             VOR 1 horizontal             VOR 1 Vertical             VOR 2 Horizontal             VOR 2 Vertical             Gaze stabilization             Ulnar Nerve [place]             Median Nerve [place]             SLS              Tandem Stance             Prone serratus           x20   Biodex             Thoracic ext              Serratus press              Doorway Pec Str      2x30\"       OTB    Y lift x10 arom   Y lift x10 OTB       SNAG ext             SNAG rot              UT/LS Str             Back              Wall clocks 15x GTB            Wall walks  15x GTB       GTB x 15      Deep Neck flex supine             Foam roll protocol              Pain/postural ucation, dx edu, vestibular edu     ;               Ther Ex 1/3 1/6  12/6 12/11 12/13 12/16 12/23 12/27 12/30   UBE 3'/3' 5' post   3'/3' 3'/3' 3'/3'   3'/3' 5'post    Scap stretch              No moneys/ horizontal abd  GTB x 30 ea   5\" x 10 GTB    OTB x 15  GTB x 15 with MFD      T/S ext, rot             Tband rows, ext  15# x 30 ea    15x ea at [place] 12# 15x ea at margie 12# 20 x ea 12# margie  20 x ea 12# margie  15x ea 14#    B/l ER             B/l IR             D2 Flexion             Pball roll out  10x3\" 3-way  10x10\" with MFD   10x10\" 3way   10x3\" 3-way 10x3\" 3-way  10x3\" 3-way w/ MFD 10x3\" 3-way w/ MFD 10x 3\" 3-way 3 x 10'\" w/ MFd   Quad Ts, Ys, Ws as able             Prone I, T, Y 20x ea 3# SB   20x prone T  15x 2# SB 15x ea 2# SB  With MFD 2# x 15 ea  15x ea 3# SB 20 ea with 2# ea   Shoulder SHurgs             Scap border stretch   Thoracic rot with MFD  Hold p!   5x10\" with MFD       Ther Activity  1/6 12/11 12/113 12/16 12/23 12/27 12/30   Patient education   KR      KR                  Gait Training  1/6 12/11 12/13 12/16 12/23 12/27 12/30                             Modalities  1/6 12/11 12/13 12/16 12/23 12/27 12/30   MHP     10' T-spine 10' T-spine   10' T-spine                                              "

## 2025-01-09 ENCOUNTER — APPOINTMENT (OUTPATIENT)
Dept: PHYSICAL THERAPY | Facility: CLINIC | Age: 40
End: 2025-01-09
Payer: COMMERCIAL

## 2025-01-13 ENCOUNTER — APPOINTMENT (OUTPATIENT)
Dept: PHYSICAL THERAPY | Facility: CLINIC | Age: 40
End: 2025-01-13
Payer: COMMERCIAL

## 2025-01-13 NOTE — PROGRESS NOTES
"Daily Note     Today's date: 2025  Patient name: Elvira Grant  : 1985  MRN: [id number]  Referring provider: No ref. provider found  Dx:   No diagnosis found.                   Subjective: \" I       Objective: See treatment diary below      Assessment: Elvira presents to therapy with neck pain. Tolerated session without adverse effects. Recommend continued skilled therapy to improve overall strength and mobility for functional return with decreased compensation and pain.        Plan: Continue per plan of care.  Progress treatment as tolerated.       Precautions:         Date 1/3 1/6 1/13 12/6 12/11 12/13 12/16 12/23 12/27 12/30   Visit # 41 42 43 34 35 36 37 [phone] 40   FOTO NV X    XXX       Re-eval                 Focus on NM control of Left scapula and posterior cervical musculature.     Manuals 1/3 1/6 1/13 12/6 12/11 12/13 12/16 12/23 12/27 12/30   T/S Mobe PWK    PWK PWK   PWK    UT/LS Str    TE      KR    Scapular Mob PWK   TE  PWK PWK   PWK    R Shldr PROM and STM PWK L     PWK L PKW L    PWK L    Cupping   KR scap border      KR scap border  KR and MFR / pec release KR and MFR / pec release   KR scap    CS gentle traction             IASTM  KR scap border andL levator             Median Nerve Flossing             T spine STM PWK    PWK PWK   PWK KR and cervical    Vestibular Testing             Neuro Re-Ed 1/3 1/6 1/13 12/6 12/11 12/13 12/16 12/23 12/27 12/30   Pt Education             VOR 1 horizontal             VOR 1 Vertical             VOR 2 Horizontal             VOR 2 Vertical             Gaze stabilization             Ulnar Nerve [place]             Median Nerve [place]             SLS              Tandem Stance             Prone serratus           x20   Biodex             Thoracic ext              Serratus press              Doorway Pec Str      2x30\"       OTB    Y lift x10 arom   Y lift x10 OTB       SNAG ext             SNAG rot              UT/LS Str             Back           " "   Wall clocks 15x GTB            Wall walks  15x GTB       GTB x 15      Deep Neck flex supine             Foam roll protocol              Pain/postural ucation, dx edu, vestibular edu     ;               Ther Ex 1/3 1/6 1/13 12/6 12/11 12/13 12/16 12/23 12/27 12/30   UBE 3'/3' 5' post   3'/3' 3'/3' 3'/3'   3'/3' 5'post    Scap stretch              No moneys/ horizontal abd  GTB x 30 ea   5\" x 10 GTB    OTB x 15  GTB x 15 with MFD      T/S ext, rot             Tband rows, ext  15# x 30 ea    15x ea at [place] 12# 15x ea at [place] 12# 20 x ea 12# [place]od[place]  20 x ea 12# jodie  15x ea 14#    B/l ER             B/l IR             D2 Flexion             Pball roll out  10x3\" 3-way  10x10\" with MFD   10x10\" 3way   10x3\" 3-way 10x3\" 3-way  10x3\" 3-way w/ MFD 10x3\" 3-way w/ MFD 10x 3\" 3-way 3 x 10'\" w/ MFd   Quad Ts, Ys, Ws as able             Prone I, T, Y 20x ea 3# SB   20x prone T  15x 2# SB 15x ea 2# SB  With MFD 2# x 15 ea  15x ea 3# SB 20 ea with 2# ea   Shoulder SHurgs             Scap border stretch   Thoracic rot with MFD  Hold p!   5x10\" with MFD       Ther Activity  1/6 1/13 12/11 12/113 12/16 12/23 12/27 12/30   Patient education   KR      KR                  Gait Training  1/6 1/13 12/11 12/13 12/16 12/23 12/27 12/30                             Modalities  1/6 12/11 12/13 12/16 12/23 12/27 12/30   MHP     10' T-spine 10' T-spine   10' T-spine                                              "

## 2025-01-17 ENCOUNTER — OFFICE VISIT (OUTPATIENT)
Dept: PHYSICAL THERAPY | Facility: CLINIC | Age: 40
End: 2025-01-17
Payer: COMMERCIAL

## 2025-01-17 ENCOUNTER — APPOINTMENT (OUTPATIENT)
Dept: PHYSICAL THERAPY | Facility: CLINIC | Age: 40
End: 2025-01-17
Payer: COMMERCIAL

## 2025-01-17 DIAGNOSIS — G89.29 CHRONIC LEFT-SIDED THORACIC BACK PAIN: ICD-10-CM

## 2025-01-17 DIAGNOSIS — M54.6 CHRONIC LEFT-SIDED THORACIC BACK PAIN: ICD-10-CM

## 2025-01-17 DIAGNOSIS — M54.2 NECK PAIN, CHRONIC: Primary | ICD-10-CM

## 2025-01-17 DIAGNOSIS — G89.29 NECK PAIN, CHRONIC: Primary | ICD-10-CM

## 2025-01-17 PROCEDURE — 97110 THERAPEUTIC EXERCISES: CPT

## 2025-01-17 PROCEDURE — 97010 HOT OR COLD PACKS THERAPY: CPT

## 2025-01-17 PROCEDURE — 97140 MANUAL THERAPY 1/> REGIONS: CPT

## 2025-01-17 PROCEDURE — 97112 NEUROMUSCULAR REEDUCATION: CPT

## 2025-01-17 NOTE — PROGRESS NOTES
Daily Note     Today's date: 2025  Patient name: Elvira Grant  : 1985  MRN: [id number]  Referring provider: Ramon Schwab DO  Dx:   Encounter Diagnosis     ICD-10-CM    1. Neck pain, chronic  M54.2     G89.29       2. Chronic left-sided thoracic back pain  M54.6     G89.29           Start Time: 0910  Stop Time: 1000  Total time in clinic (min): 50 minutes    Subjective: Pt reports that PT session 10 minutes late. Pt noted that his scapula and lower throacic spine have been very painful the last few days, resulting in him needing to take gabapentin for pain reduction. Pt also reports that he saw his neurologist last week, who sent updated script and he has the results of the visit, but he forgot his phone in the car so he will bring those both in next session.       Objective: See treatment diary below      Assessment: Pt tolerated treatment fair. All of pts thoracic and shoulder musculature was very flared up coming into today's session, with increased tension seen and felt throughout all associated musculature. Several manual interventions such as STM, stretching, and mobilizations were performed at attempt to decreased overall symptom intensity. After open book stretch and foam roller protocols were re-introduced to attempt for even further symptom reduction in both body part globally. Ended PT session with MHP throughout full thoracic spine. Pt responded well to all manual interventions, stretches, and modalities with decreased overall symptom intensity and tension in thoracic spine and shoulder post session.  Patient exhibited good technique with therapeutic exercises and would benefit from continued PT      Plan: Continue per plan of care.  Progress treatment as tolerated.       Precautions:         Date 1/3 1/6     12/16 12/23 12/27 12/30   Visit # 41 42     37 38 39 40   FOTO NV X           Re-eval                 Focus on NM control of Left scapula and posterior cervical musculature.  "    Manuals 1/3 1/6 1/17    12/16 12/23 12/27 12/30   T/S Mobe PWK  PWK      PWK    UT/LS Str          KR    Scapular Mob PWK  PWK      PWK    R Shldr PROM and STM PWK L   PWK L       PWK L    Cupping   KR scap border      KR scap border  KR and MFR / pec release KR and MFR / pec release   KR scap    CS gentle traction             IASTM  KR scap border andL levator             Median Nerve Flossing             T spine STM PWK  PWK      PWK KR and cervical    Vestibular Testing             Neuro Re-Ed 1/3 1/6 1/17    12/16 12/23 12/27 12/30   Pt Education             VOR 1 horizontal             VOR 1 Vertical             VOR 2 Horizontal             VOR 2 Vertical             Gaze stabilization             Ulnar Nerve [place]             Median Nerve [place]             SLS              Tandem Stance             Prone serratus           x20   Biodex             Thoracic ext              Serratus press              Doorway Pec Str             OTB       Y lift x10 OTB       SNAG ext             SNAG rot              UT/LS Str             Back              Wall clocks 15x GTB            Wall walks  15x GTB       GTB x 15      Deep Neck flex supine             Foam roll protocol    8'          Pain/postural ucation, dx edu, vestibular edu             Ther Ex 1/3 1/6 1/17    12/16 12/23 12/27 12/30   UBE 3'/3' 5' post  '3/3'      3'/3' 5'post    Open Books   10x5\" ea          Scap stretch              No moneys/ horizontal abd  GTB x 30 ea      OTB x 15  GTB x 15 with MFD      T/S ext, rot             Tband rows, ext  15# x 30 ea      20 x ea 12# margie  20 x ea 12# margie  15x ea 14#    B/l ER             B/l IR             D2 Flexion             Pball roll out  10x3\" 3-way  10x10\" with MFD      10x3\" 3-way w/ MFD 10x3\" 3-way w/ MFD 10x 3\" 3-way 3 x 10'\" w/ MFd   Quad Ts, Ys, Ws as able             Prone I, T, Y 20x ea 3# SB       With MFD 2# x 15 ea  15x ea 3# SB 20 ea with 2# ea   Shoulder SHurgs             Scap " "border stretch   Thoracic rot with MFD     5x10\" with MFD       Ther Activity  1/6 1/17 12/16 12/23 12/27 12/30   Patient education   KR      KR                  Gait Training  1/6 1/17 12/16 12/23 12/27 12/30                             Modalities  1/6 1/17 12/16 12/23 12/27 12/30   MHP   10'      10' T-spine                                                "

## 2025-01-20 ENCOUNTER — OFFICE VISIT (OUTPATIENT)
Dept: PHYSICAL THERAPY | Facility: CLINIC | Age: 40
End: 2025-01-20
Payer: COMMERCIAL

## 2025-01-20 DIAGNOSIS — G89.29 NECK PAIN, CHRONIC: ICD-10-CM

## 2025-01-20 DIAGNOSIS — G89.29 CHRONIC LEFT-SIDED THORACIC BACK PAIN: ICD-10-CM

## 2025-01-20 DIAGNOSIS — M54.6 CHRONIC LEFT-SIDED THORACIC BACK PAIN: ICD-10-CM

## 2025-01-20 DIAGNOSIS — H83.2X3 VESTIBULAR HYPOFUNCTION OF BOTH EARS: Primary | ICD-10-CM

## 2025-01-20 DIAGNOSIS — M54.2 NECK PAIN, CHRONIC: ICD-10-CM

## 2025-01-20 PROCEDURE — 97140 MANUAL THERAPY 1/> REGIONS: CPT

## 2025-01-20 PROCEDURE — 97110 THERAPEUTIC EXERCISES: CPT

## 2025-01-20 PROCEDURE — 97112 NEUROMUSCULAR REEDUCATION: CPT

## 2025-01-20 NOTE — PROGRESS NOTES
"Daily Note     Today's date: 2025  Patient name: Elvira Grant  : 1985  MRN: [id number]  Referring provider: No ref. provider found  Dx:   Encounter Diagnosis     ICD-10-CM    1. Vestibular hypofunction of both ears  H83.2X3       2. Chronic left-sided thoracic back pain  M54.6     G89.29       3. Neck pain, chronic  M54.2     G89.29                        Subjective: \" I just have the scap pain in that one spot\"       Objective: See treatment diary below      Assessment:  Elvira presents to therapy with shoulder/ neck pain. Notes pain in the scap border today- continued with MFD to tolerance. Advised patient to see physician due to continued pain levels and plateau with functional progression.  Continued to focus on scap activation noting gross fatigue throughout. Tolerated session without adverse effects. Recommend continued skilled therapy to improve overall strength and mobility for functional return with decreased compensation and pain.        Plan: Continue per plan of care.  Progress treatment as tolerated.       Precautions:         Date 1/3 1/6 1/17 1/20   12/16 12/23 12/27 12/30   Visit # 41 42 43 44   37 38 39 40   FOTO NV X           Re-eval                 Focus on NM control of Left scapula and posterior cervical musculature.     Manuals 1/3 1/6 1/17 1/20   12/16 12/23 12/27 12/30   T/S Mobe PWK  PWK      PWK    UT/LS Str          KR    Scapular Mob PWK  PWK      PWK    R Shldr PROM and STM PWK L   PWK L       PWK L    Cupping   KR scap border   KR scap border    KR scap border  KR and MFR / pec release KR and MFR / pec release   KR scap    CS gentle traction             IASTM  KR scap border andL levator             Median Nerve Flossing             T spine STM PWK  PWK      PWK KR and cervical    Vestibular Testing             Neuro Re-Ed 1/3 1/6 1/17 1/20   12/16 12/23 12/27 12/30   Pt Education             VOR 1 horizontal             VOR 1 Vertical             VOR 2 Horizontal      " "       VOR 2 Vertical             Gaze stabilization             Ulnar Nerve [place]             Median Nerve Glide             Y lift     OTB x 10          Chin tucks     5\" x 10          Prone serratus           x20   Biodex             Thoracic ext              Serratus press              Doorway Pec Str             OTB       Y lift x10 OTB       SNAG ext             SNAG rot              UT/LS Str             Back              Wall clocks 15x GTB            Wall walks  15x GTB       GTB x 15      Deep Neck flex supine             Foam roll protocol    8'          Pain/postural ucation, dx edu, vestibular edu             Ther Ex 1/3 1/6 1/17 1/20   12/16 12/23 12/27 12/30   UBE 3'/3' 5' post  '3/3' 5' post      3'/3' 5'post    Open Books   10x5\" ea          Scap stretch              No moneys/ horizontal abd  GTB x 30 ea   GTB x 30 ea    OTB x 15  GTB x 15 with MFD      T/S ext, rot             Tband rows, ext  15# x 30 ea   [place] high rows 8# x 20    20 x ea 12# margie  20 x ea 12# margie  15x ea 14#    B/l ER             B/l IR             D2 Flexion             Pball roll out  10x3\" 3-way  10x10\" with MFD   5x10\" with MFD    10x3\" 3-way w/ MFD 10x3\" 3-way w/ MFD 10x 3\" 3-way 3 x 10'\" w/ MFd   Quad Ts, Ys, Ws as able             Prone I, T, Y 20x ea 3# SB       With MFD 2# x 15 ea  15x ea 3# SB 20 ea with 2# ea   Shoulder SHurgs             Scap border stretch   Thoracic rot with MFD     5x10\" with MFD       Ther Activity  1/6 1/17 1/20 12/16 12/23 12/27 12/30   Patient education   KR      KR                  Gait Training  1/6 1/17 1/20 12/16 12/23 12/27 12/30                             Modalities  1/6 1/17 1/20 12/16 12/23 12/27 12/30   MHP   10'      10' T-spine                                                "

## 2025-01-24 ENCOUNTER — OFFICE VISIT (OUTPATIENT)
Dept: PHYSICAL THERAPY | Facility: CLINIC | Age: 40
End: 2025-01-24
Payer: COMMERCIAL

## 2025-01-24 DIAGNOSIS — M54.2 NECK PAIN, CHRONIC: ICD-10-CM

## 2025-01-24 DIAGNOSIS — G89.29 NECK PAIN, CHRONIC: ICD-10-CM

## 2025-01-24 DIAGNOSIS — H83.2X3 VESTIBULAR HYPOFUNCTION OF BOTH EARS: Primary | ICD-10-CM

## 2025-01-24 DIAGNOSIS — M54.6 CHRONIC LEFT-SIDED THORACIC BACK PAIN: ICD-10-CM

## 2025-01-24 DIAGNOSIS — G89.29 CHRONIC LEFT-SIDED THORACIC BACK PAIN: ICD-10-CM

## 2025-01-24 PROCEDURE — 97110 THERAPEUTIC EXERCISES: CPT

## 2025-01-24 PROCEDURE — 97112 NEUROMUSCULAR REEDUCATION: CPT

## 2025-01-24 PROCEDURE — 97140 MANUAL THERAPY 1/> REGIONS: CPT

## 2025-01-24 NOTE — PROGRESS NOTES
Daily Note     Today's date: 2025  Patient name: Elvira Grant  : 1985  MRN: [id number]  Referring provider: No ref. provider found  Dx:   Encounter Diagnosis     ICD-10-CM    1. Vestibular hypofunction of both ears  H83.2X3       2. Chronic left-sided thoracic back pain  M54.6     G89.29       3. Neck pain, chronic  M54.2     G89.29           Start Time: 1010  Stop Time: 1105  Total time in clinic (min): 55 minutes    Subjective: Patient reports that he has continued soreness along left scapula but overall is getting better.       Objective: See treatment diary below      Assessment: No changes to program this session. Patient was appropriately challenged by program but appeared to toelrate treatment well. Palpable tightness into left rhomboids and spasm at levator distal insertion. Patient demonstrated fatigue post treatment, exhibited good technique with therapeutic exercises, and would benefit from continued PT      Plan: Continue per plan of care.  Progress treatment as tolerated.       Precautions:         Date 1/3 1/6 1/17 1/20 1/24  12/16 12/23 12/27 12/30   Visit # [phone]  37 [phone]   FOTO NV X   DONE        Re-eval                 Focus on NM control of Left scapula and posterior cervical musculature.     Manuals 1/3 1/6 1/17 1/20 1/24  12/16 12/23 12/27 12/30   T/S Mobe PWK  PWK      PWK    UT/LS Str          KR    Scapular Mob PWK  PWK  JL    PWK    R Shldr PROM and STM PWK L   PWK L   STM to raji-scap muscles    PWK L    Cupping   KR scap border   KR scap border    KR scap border  KR and MFR / pec release KR and MFR / pec release   KR scap    CS gentle traction             IASTM  KR scap border andL levator             Median Nerve Flossing             T spine STM PWK  PWK      PWK KR and cervical    Vestibular Testing             Neuro Re-Ed 1/3 1/6 1/17 1/20 1/24  12/16 12/23 12/27 12/30   Pt Education             VOR 1 horizontal             VOR 1 Vertical             VOR  "2 Horizontal             VOR 2 Vertical             Gaze stabilization             Ulnar Nerve [place]             Median Nerve Glide             Y lift     OTB x 10  NV        Chin tucks     5\" x 10  5\"x10        Prone serratus           x20   Biodex             Thoracic ext              Serratus press              Doorway Pec Str             OTB       Y lift x10 OTB       SNAG ext             SNAG rot              UT/LS Str             Back              Wall clocks 15x GTB            Wall walks  15x GTB       GTB x 15      Deep Neck flex supine             Foam roll protocol    8'          Pain/postural ucation, dx edu, vestibular edu             Ther Ex 1/3 1/6 1/17 1/20 1/24  12/16 12/23 12/27 12/30   UBE 3'/3' 5' post  '3/3' 5' post  3'/3'    3'/3' 5'post    Open Books   10x5\" ea          Scap stretch              No moneys/ horizontal abd  GTB x 30 ea   GTB x 30 ea  GTB x 30 ea  OTB x 15  GTB x 15 with MFD      T/S ext, rot             Tband rows, ext  15# x 30 ea   Jodie high rows 8# x 20  Rows/Ext  15# 3x10 ea  20 x ea 12# jodie  20 x ea 12# jodie  15x ea 14#    B/l ER             B/l IR             D2 Flexion             Pball roll out  10x3\" 3-way  10x10\" with MFD   5x10\" with MFD  3 dir 10\"x5 ea  10x3\" 3-way w/ MFD 10x3\" 3-way w/ MFD 10x 3\" 3-way 3 x 10'\" w/ MFd   Quad Ts, Ys, Ws as able             Prone I, T, Y 20x ea 3# SB       With MFD 2# x 15 ea  15x ea 3# SB 20 ea with 2# ea   Shoulder SHurgs             Scap border stretch   Thoracic rot with MFD     5x10\" with MFD       Ther Activity  1/6 1/17 1/20 1/24 12/16 12/23 12/27 12/30   Patient education   KR      KR                  Gait Training  1/6 1/17 1/20 1/24 12/16 12/23 12/27 12/30                             Modalities  1/6 1/17 1/20 1/24 12/16 12/23 12/27 12/30   MHP   10'      10' T-spine                                                  "

## 2025-01-27 ENCOUNTER — OFFICE VISIT (OUTPATIENT)
Dept: PHYSICAL THERAPY | Facility: CLINIC | Age: 40
End: 2025-01-27
Payer: COMMERCIAL

## 2025-01-27 DIAGNOSIS — M54.6 CHRONIC LEFT-SIDED THORACIC BACK PAIN: ICD-10-CM

## 2025-01-27 DIAGNOSIS — H83.2X3 VESTIBULAR HYPOFUNCTION OF BOTH EARS: Primary | ICD-10-CM

## 2025-01-27 DIAGNOSIS — M54.2 NECK PAIN, CHRONIC: ICD-10-CM

## 2025-01-27 DIAGNOSIS — G89.29 NECK PAIN, CHRONIC: ICD-10-CM

## 2025-01-27 DIAGNOSIS — G89.29 CHRONIC LEFT-SIDED THORACIC BACK PAIN: ICD-10-CM

## 2025-01-27 PROCEDURE — 97140 MANUAL THERAPY 1/> REGIONS: CPT

## 2025-01-27 PROCEDURE — 97110 THERAPEUTIC EXERCISES: CPT

## 2025-01-27 PROCEDURE — 97530 THERAPEUTIC ACTIVITIES: CPT

## 2025-01-27 PROCEDURE — 97112 NEUROMUSCULAR REEDUCATION: CPT

## 2025-01-27 NOTE — PROGRESS NOTES
"Daily Note     Today's date: 2025  Patient name: Elvira Grant  : 1985  MRN: [id number]  Referring provider: No ref. provider found  Dx:   Encounter Diagnosis     ICD-10-CM    1. Vestibular hypofunction of both ears  H83.2X3       2. Chronic left-sided thoracic back pain  M54.6     G89.29       3. Neck pain, chronic  M54.2     G89.29                        Subjective: \" I feel bad- I am having pain in my neck it started yesterday\"       Objective: See treatment diary below      Assessment: Elvira presents to therapy with neck/ scap pain. Arrived with increased pain in the neck today- advised patient to make appointment with physician as pain seems to have increased in intensity and frequency. Added heat to facilitate improved mobility and reduce muscle restriction- note increased restriction throughout.  Improved scap activation noted could progress resistance with exercises. Note increased difficulty with scap activation/ post rot of shoulder blade. Tolerated session without adverse effects. Recommend continued skilled therapy to improve overall strength and mobility for functional return with decreased compensation and pain.        Plan: Continue per plan of care.  Progress treatment as tolerated.       Precautions:         Date 1/3 1/6 1/17 1/20 1/24 1/27 12/16 12/23 12/27 12/30   Visit # [phone] 46 [phone]   FOTO NV X   DONE        Re-eval                 Focus on NM control of Left scapula and posterior cervical musculature.     Manuals 1/3 1/6 1/17 1/20 1/24 1/27 12/16 12/23 12/27 12/30   T/S Mobe PWK  PWK      PWK    UT/LS Str          KR    Scapular Mob PWK  PWK  JL 1st rib mob grade III   PWK    R Shldr PROM and STM PWK L   PWK L   STM to raji-scap muscles    PWK L    Cupping   KR scap border   KR scap border    KR scap border  KR and MFR / pec release KR and MFR / pec release   KR scap    CS gentle traction             IASTM  KR scap border andL levator             Median Nerve " "Flossing             T spine STM PWK  PWK      PWK KR and cervical    Vestibular Testing             Neuro Re-Ed 1/3 1/6 1/17 1/20 1/24 1/27 12/16 12/23 12/27 12/30   Pt Education             VOR 1 horizontal             VOR 1 Vertical             VOR 2 Horizontal             VOR 2 Vertical             Gaze stabilization             Ulnar Nerve [place]             Median Nerve Glide             Y lift     OTB x 10  NV        Chin tucks     5\" x 10  5\"x10 5\" x 15        Prone serratus           x20   Biodex             Thoracic ext              Serratus press              Doorway Pec Str             OTB       Y lift x10 OTB       SNAG ext             SNAG rot              UT/LS Str             Back              Wall clocks 15x GTB            Wall walks  15x GTB       GTB x 15      Deep Neck flex supine      5\" x 10       Foam roll protocol    8'          Pain/postural ucation, dx edu, vestibular edu             Ther Ex 1/3 1/6 1/17 1/20 1/24 1/27 12/16 12/23 12/27 12/30   UBE 3'/3' 5' post  '3/3' 5' post  3'/3' 5' post    3'/3' 5'post    Open Books   10x5\" ea          Scap stretch       3x30\" and UT 3x30\"        No moneys/ horizontal abd  GTB x 30 ea   GTB x 30 ea  GTB x 30 ea GTB x 30  OTB x 15  GTB x 15 with MFD      T/S ext, rot             Tband rows, ext  15# x 30 ea   Jodie high rows 8# x 20  Rows/Ext  15# 3x10 ea  20 x ea 12# jodie  20 x ea 12# jodie  15x ea 14#    B/l ER             B/l IR             D2 Flexion             Pball roll out  10x3\" 3-way  10x10\" with MFD   5x10\" with MFD  3 dir 10\"x5 ea 10x10\" fwd  10x3\" 3-way w/ MFD 10x3\" 3-way w/ MFD 10x 3\" 3-way 3 x 10'\" w/ MFd   Quad Ts, Ys, Ws as able             Prone I, T, Y 20x ea 3# SB     Bent over Is x3#   With MFD 2# x 15 ea  15x ea 3# SB 20 ea with 2# ea   Shoulder SHurgs             Scap border stretch   Thoracic rot with MFD     5x10\" with MFD       Ther Activity  1/6 1/17 1/20 1/24 1/27 12/16 12/23 12/27 12/30   Patient education   KR    " ZINA IZAGUIRRE                  Gait Training  1/6 1/17 1/20 1/24 12/16 12/23 12/27 12/30                             Modalities  1/6 1/17 1/20 1/24 1/27 12/16 12/23 12/27 12/30   MHP   10'   10' with TE    10' T-spine

## 2025-01-30 ENCOUNTER — OFFICE VISIT (OUTPATIENT)
Age: 40
End: 2025-01-30
Payer: COMMERCIAL

## 2025-01-30 VITALS
TEMPERATURE: 97.9 F | SYSTOLIC BLOOD PRESSURE: 110 MMHG | DIASTOLIC BLOOD PRESSURE: 70 MMHG | OXYGEN SATURATION: 99 % | WEIGHT: 126.4 LBS | HEIGHT: 70 IN | HEART RATE: 67 BPM | BODY MASS INDEX: 18.09 KG/M2

## 2025-01-30 DIAGNOSIS — G89.29 CHRONIC LEFT-SIDED THORACIC BACK PAIN: ICD-10-CM

## 2025-01-30 DIAGNOSIS — M54.2 CHRONIC CERVICAL PAIN: Primary | ICD-10-CM

## 2025-01-30 DIAGNOSIS — M54.12 CERVICAL RADICULOPATHY: ICD-10-CM

## 2025-01-30 DIAGNOSIS — M54.6 CHRONIC LEFT-SIDED THORACIC BACK PAIN: ICD-10-CM

## 2025-01-30 DIAGNOSIS — R42 VERTIGO: ICD-10-CM

## 2025-01-30 DIAGNOSIS — H53.9 VISUAL DISTURBANCE: ICD-10-CM

## 2025-01-30 DIAGNOSIS — G89.29 CHRONIC CERVICAL PAIN: Primary | ICD-10-CM
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
## 2025-01-30 PROCEDURE — 99214 OFFICE O/P EST MOD 30 MIN: CPT | Performed by: FAMILY MEDICINE

## 2025-01-30 RX ORDER — GABAPENTIN 100 MG/1
100 CAPSULE ORAL
COMMUNITY
Start: 2025-01-10
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
## 2025-01-31 ENCOUNTER — OFFICE VISIT (OUTPATIENT)
Dept: PHYSICAL THERAPY | Facility: CLINIC | Age: 40
End: 2025-01-31
Payer: COMMERCIAL

## 2025-01-31 DIAGNOSIS — M54.6 CHRONIC LEFT-SIDED THORACIC BACK PAIN: Primary | ICD-10-CM

## 2025-01-31 DIAGNOSIS — G89.29 CHRONIC LEFT-SIDED THORACIC BACK PAIN: Primary | ICD-10-CM

## 2025-01-31 DIAGNOSIS — G89.29 NECK PAIN, CHRONIC: ICD-10-CM

## 2025-01-31 DIAGNOSIS — M54.2 NECK PAIN, CHRONIC: ICD-10-CM

## 2025-01-31 PROCEDURE — 97110 THERAPEUTIC EXERCISES: CPT

## 2025-01-31 NOTE — PROGRESS NOTES
Daily Note     Today's date: 2025  Patient name: Elvira Grant  : 1985  MRN: [id number]  Referring provider: Ramon Schwab DO  Dx:   Encounter Diagnosis     ICD-10-CM    1. Chronic left-sided thoracic back pain  M54.6     G89.29       2. Neck pain, chronic  M54.2     G89.29           Start Time: 1135  Stop Time: 1215  Total time in clinic (min): 40 minutes    Subjective: Pt reports that he is feeling good coming into today's session, with less intense discomfort noted.       Objective: See treatment diary below      Assessment: Pt tolerated treatment well. Due to pt noting he is feeling better coming into today's session, increased session intensity to improve overall strength and endurance to all shoulder, rotator cuff, scapular stabilization, and postural musculature. Pt was still challenged with exercise session, needing several rest breaks, but he was able to complete all sets and reps with proper form and appropriate levels of fatigue post session after rest breaks. Pt noted increased soreness and fatigue in musculature post session,  but did not note increased pain or symptom intensity. Patient exhibited good technique with therapeutic exercises and would benefit from continued PT      Plan: Continue per plan of care.  Progress treatment as tolerated.       Precautions:         Date 1/3 1/6 1/17 1/20 1/24 1/27 1/31      Visit # [phone] 47      FOTO NV X   DONE        Re-eval                 Focus on NM control of Left scapula and posterior cervical musculature.     Manuals 1/3 1/6 1/17 1/20 1/24 1/27 1/31      T/S Mobe PWK  PWK          UT/LS Str             Scapular Mob PWK  PWK  JL 1st rib mob grade III       R Shldr PROM and STM PWK L   PWK L   STM to raji-scap muscles        Cupping   KR scap border   KR scap border          CS gentle traction             IASTM  KR scap border andL levator             Median Nerve Flossing             T spine STM PWK  PWK          Vestibular  "Testing             Neuro Re-Ed 1/3 1/6 1/17 1/20 1/24 1/27 1/31      Pt Education             VOR 1 horizontal             VOR 1 Vertical             VOR 2 Horizontal             VOR 2 Vertical             Gaze stabilization             Ulnar Nerve [place]             Median Nerve Glide             Y lift     OTB x 10  NV        Chin tucks     5\" x 10  5\"x10 5\" x 15        Prone serratus              Biodex             Thoracic ext              Serratus press              Doorway Pec Str             OTB             SNAG ext             SNAG rot              UT/LS Str             Back              Wall clocks 15x GTB            Wall walks  15x GTB            Deep Neck flex supine      5\" x 10       Foam roll protocol    8'          Pain/postural ucation, dx edu, vestibular edu             Ther Ex 1/3 1/6 1/17 1/20 1/24 1/27 1/31      UBE 3'/3' 5' post  '3/3' 5' post  3'/3' 5' post  3'/3'      Open Books   10x5\" ea          Scap stretch       3x30\" and UT 3x30\"        No moneys/ horizontal abd  GTB x 30 ea   GTB x 30 ea  GTB x 30 ea GTB x 30  30x GTB      T/S ext, rot             Tband rows, ext  15# x 30 ea   [place] high rows 8# x 20  Rows/Ext  15# 3x10 ea  2x10 15# ea      B/l ER       5# 20x      B/l IR       7# 20x      D2 Flexion       20x BlackTb      Pball roll out  10x3\" 3-way  10x10\" with MFD   5x10\" with MFD  3 dir 10\"x5 ea 10x10\" fwd  10x3\" ea      Quad Ts, Ys, Ws as able             Prone I, T, Y 20x ea 3# SB     Bent over Is x3#        Shoulder SHurgs       15x 3#      Scap Squeezes       15x 3#      Scap border stretch   Thoracic rot with MFD           Ther Activity  1/6 1/17 1/20 1/24 1/27 1/31      Patient education   KR    KR                    Gait Training  1/6 1/17 1/20 1/24                                  Modalities  1/6 1/17 1/20 1/24 1/27       MHP   10'   10' with TE                                                        "

## 2025-02-03 ENCOUNTER — EVALUATION (OUTPATIENT)
Dept: PHYSICAL THERAPY | Facility: CLINIC | Age: 40
End: 2025-02-03
Payer: COMMERCIAL

## 2025-02-03 DIAGNOSIS — M54.6 CHRONIC LEFT-SIDED THORACIC BACK PAIN: ICD-10-CM

## 2025-02-03 DIAGNOSIS — M54.2 NECK PAIN, CHRONIC: ICD-10-CM

## 2025-02-03 DIAGNOSIS — H83.2X3 VESTIBULAR HYPOFUNCTION OF BOTH EARS: ICD-10-CM

## 2025-02-03 DIAGNOSIS — G89.29 CHRONIC LEFT-SIDED THORACIC BACK PAIN: ICD-10-CM

## 2025-02-03 DIAGNOSIS — G89.29 NECK PAIN, CHRONIC: ICD-10-CM

## 2025-02-03 DIAGNOSIS — S06.0X0D CONCUSSION WITHOUT LOSS OF CONSCIOUSNESS, SUBSEQUENT ENCOUNTER: Primary | ICD-10-CM

## 2025-02-03 PROCEDURE — 97164 PT RE-EVAL EST PLAN CARE: CPT

## 2025-02-03 PROCEDURE — 97530 THERAPEUTIC ACTIVITIES: CPT

## 2025-02-03 NOTE — PROGRESS NOTES
Re-Evaluation     Today's date: 2/3/2025  Patient name: Elvira Grant  : 1985  MRN: [id number]  Referring provider: Enrique Colmenares MD  Dx:   Encounter Diagnosis     ICD-10-CM    1. Concussion without loss of consciousness, subsequent encounter  S06.0X0D       2. Chronic left-sided thoracic back pain  M54.6     G89.29       3. Neck pain, chronic  M54.2     G89.29       4. Vestibular hypofunction of both ears  H83.2X3                      Subjective:   2/3/25: Elvira presents to therapy with addition of concussion dx initially. Notes shorter tempter, increased double vision with closer object looking within 6 inches. Reports a fog feeling between himself. Met with a opthomalogist at the end of last year and was prescribed new glasses but did not get them reporting that insurance did not cover them. Has not had vision therapy at this period in time. Patient follows up with neuropsychology having 1 meeting with them so far reports it will be weekly. Reports that he was staring at his screen yesterday and felt some dizziness but this has not happened before and eye strain. Reports that is the only time he has been dizzy. Denies headaches. Reports the neck pain is still present but not serve.     Current: 3/10 highest 3/10     Denies tinnitus and hearing loss. Reports having short term memory loss.     Balance notes deficits only with his eyes closed. Reports he doesn't notice it unless closing his eyes and doing the testing here.         Pt reports that he was feeling good coming into today's session with minimal pain or discomfort overall, due to minimal overall activity over the last few days. Pt reported that after UBE and UT/LS stretch, he reported that he was having pain in his left side of his neck, and numbness and tingling going down his left arm all the way to his thumb.       Objective: See treatment diary below        Cervical exam   Ligament Laxity Testing   Alar ligament: WNL  Sharp Shane:  WNL    Modified VBI   Seated posture: forward head posture    Oculomotor exam   Oculomotor ROM: WNL  Resting nystagmus: not present   Gaze holding nystagmus: not present left reports double vision with prolonged hold   and not present right  Smooth pursuits: within normal limits  Vertical saccades: normal  Horizontal saccades: normal and twitching of eyelids felt double vision after awhile   Convergence: normal-patient retracted initial double which was WNL but upon reassessment was on the same spot   Cover test: normal  Crossover test: normal      Positional testing   Mauricio-Hallpike   Left posterior canal: WNL  Right posterior canal: WNL  Roll test   Left horizontal canal: WNL  Right horizontal canal: WNL  Positional testing comment: decreased focus on both stationary and moving objects  Poor control with VOR 1 both vertically and horizontally  Increased dryness of eyes and twitching of eyelids with positional testing  Assess NV due to time     Balance assessments   MCTSIB   Eyes open level surface: 60 sec  Eyes open foam surface: 60 seconds  Eyes closed level surface: 60 sec,initially straight then reports R lean with quick L adjustment mild R lean noted after   Eyes closed foam surface: 60 seconds, mild sway, reports feeling ok with EO     Palpation   Left   Hypertonicity in the UT and levator with tenderness L > R   SOR tenderness and restriction  Mid and lower trap restriction       Right   TTP throughout the R UT and levator with palpable restriction and   Hypertonicity throughout > L   SOR restriction       Active Range of Motion   Cervical  Subcranial protraction:  WFL   Subcranial retraction: minimal no pain   Flexion:  minimal w pain in middle of neck   Extension:  WFL w pain- semi unstable with movement   Left lateral flexion:  moderate with pain  Right lateral flexion:  minimal with pain   Left rotation:   Restriction level: WFL pain on L side   Right rotation:  Restriction level: WFL pain in the middle    Left Shoulder   Normal active range of motion    B/l Shoulder   Normal active range of motion    Additional Active Range of Motion Details  Extension compensation excessive upper cervical extension , lower cervical extension     Scapular Mobility     Left Shoulder   Scapular Dyskinesis: grade I  Scapular mobility: good  Scapular Mobility with Shoulder to 90° FF   Scapular winging: minimal  Scapular elevation: minimal  Upward rotation: premature  Downward rotation: excessive    Scapular Mobility beyond 90° FF   Scapular winging: minimal  Upward rotation: premature  Downward rotation: excessive    Joint Play   Joints within functional limits: C4, C5, C6, C7 and T1     Hypomobile: C1, C2 and C3, T2-T7    Strength/Myotome Testing   Cervical Spine     Left   Interossei strength (t1): 4+    Right   Interossei strength (t1): 4+    Left Shoulder     Planes of Motion   Flexion: 4  Abduction: 4  External rotation at 0°: 4  Internal rotation at 0°: 4    Isolated Muscles   Lower trapezius: 4   Middle trapezius: 4   Serratus anterior: 4    Right Shoulder     Planes of Motion   Flexion: 5   Abduction: 5   External rotation at 0°: 5   Internal rotation at 0°: 5     Isolated Muscles   Lower trapezius: 4+   Middle trapezius: 4+   Serratus anterior: 4+     Left Elbow   Flexion: 5  Extension: 5    Right Elbow   Flexion: 5  Extension: 5    Left Wrist/Hand   Wrist extension: 5  Thumb extension: 5    Right Wrist/Hand   Wrist extension: 5  Thumb extension: 5    Assess NV no time today on 2/3    Other Comments:  Increased tension of left median nerve    Assessment:   Elvira presents for RE for addition of concussion dx as per referring physician. Currently has been following with PT for vestibular and cervical since July. Discussed seeing optometry and obtaining the prescription glassless recommended due to most deficits noted with vision/ double vision etc. Also discussed possibility of vision therapy and to discuss with referring  physician. Additionally asked for copy of [facility] protocol for vestibular rehab. Cervical ROM slightly improved with continued end range pain and mobility deficits. L > R sided pain in the neck with R > L sided restrictions noted throughout. Balance improved slightly with min to mod balance deficits with foam and FT as compared to extreme loss of balance last session. Discussed obtaining MRI and EMG results from referring physician. Will continue therapy as tolerated. Tolerated session without adverse effects. Recommend continued skilled therapy to improve overall strength and mobility for functional return with decreased compensation and pain.        Goals- STG+LTG  Pt will demonstrate [place] with HEP to promote PT carry over and improve ability to manage symptoms independently once Discharged - met  Pt will demonstrate 20% improvement in FOTO score to increase ease with all ADLs and functional activities per PLOF.  - progressing  Pt will demonstrate 10 deg improvement in cervical AROM to increase tolerance to turning head when driving, and looking down when reading. - met  Pt will demonstrate 4+/5 in bilateral Ue/periscap strength to increase tolerance to lifting overhead and improve prolonged sitting - met  Pt will demonstrate ability to perform repeated overhead lifting at least 5Ibs without pain and no signs of compensation, or poor scapular movement to improve ease with putting away items in overhead shelves at work with min to no difficulty. - progressing  Pt will demonstrate completing proper pushing motion to improve tolerance to pushing boxes, opening doors etc. Without discomfort to improve tolerance to work activities. - progressing  Pt will be able to perform VOR 1 for 1+ minutes both horizontally and vertically, with no increase in symptoms. - progressing  Pt will be independent with HEP - met  Pt will not have an dizziness with end range rotation of either right or left.-not observed today  "    Plan: Continue per plan of care.  Progress treatment as tolerated.       Precautions:       Date 1/3 1/6 1/17 1/20 1/24 1/27 1/31 2/3        Visit # 41 42 43 44 45 46 47  48       FOTO NV X     DONE             Re-eval                             Focus on NM control of Left scapula and posterior cervical musculature.      Manuals 1/3 1/6 1/17 1/20 1/24 1/27 1/31 2/3        T/S Mobe PWK   PWK                 UT/LS Str                       Scapular Mob PWK   PWK   JL 1st rib mob grade III           R Shldr PROM and STM PWK L    PWK L    STM to raji-scap muscles             Cupping    KR scap border    KR scap border                CS gentle traction                       IASTM   KR scap border andL levator                     Median Nerve Flossing                       T spine STM PWK   PWK                 Vestibular Testing                       Neuro Re-Ed 1/3 1/6 1/17 1/20 1/24 1/27 1/31 2/3        Pt Education                       VOR 1 horizontal                       VOR 1 Vertical                       VOR 2 Horizontal                       VOR 2 Vertical                       Gaze stabilization                       Ulnar Nerve Glide                       Median Nerve Glide                       Y lift        OTB x 10  NV             Chin tucks        5\" x 10  5\"x10 5\" x 15            Prone serratus                        Biodex                       Thoracic ext                        Serratus press                        Doorway Pec Str                       OTB                       SNAG ext                       SNAG rot                        UT/LS Str                       Back                        Wall clocks 15x GTB                     Wall walks  15x GTB                     Deep Neck flex supine           5\" x 10           Foam roll protocol      8'                 Pain/postural ucation, dx edu, vestibular edu                       Ther Ex 1/3 1/6 1/17 1/20 1/24 1/27 1/31 2/3        UBE " "3'/3' 5' post  '3/3' 5' post  3'/3' 5' post  3'/3'         Open Books     10x5\" ea                 Scap stretch            3x30\" and UT 3x30\"            No moneys/ horizontal abd   GTB x 30 ea    GTB x 30 ea  GTB x 30 ea GTB x 30  30x GTB         T/S ext, rot                       Tband rows, ext   15# x 30 ea    [place] high rows 8# x 20  Rows/Ext  15# 3x10 ea   2x10 15# ea         B/l ER             5# 20x         B/l IR             7# 20x         D2 Flexion             20x BlackTb         Pball roll out  10x3\" 3-way  10x10\" with MFD    5x10\" with MFD  3 dir 10\"x5 ea 10x10\" fwd  10x3\" ea         Quad Ts, Ys, Ws as able                       Prone I, T, Y 20x ea 3# SB         Bent over Is x3#            Shoulder SHurgs             15x 3#         Scap Squeezes             15x 3#         Scap border stretch    Thoracic rot with MFD                   Ther Activity   1/6 1/17 1/20 1/24 1/27 1/31 2/3        Patient education    KR       KR    KR                                Gait Training   1/6 1/17 1/20 1/24                                                             Modalities   1/6 1/17 1/20 1/24 1/27           MHP     10'     10' with TE                                                       "

## 2025-02-07 ENCOUNTER — OFFICE VISIT (OUTPATIENT)
Dept: PHYSICAL THERAPY | Facility: CLINIC | Age: 40
End: 2025-02-07
Payer: COMMERCIAL

## 2025-02-07 DIAGNOSIS — G89.29 NECK PAIN, CHRONIC: ICD-10-CM

## 2025-02-07 DIAGNOSIS — S06.0X0D CONCUSSION WITHOUT LOSS OF CONSCIOUSNESS, SUBSEQUENT ENCOUNTER: Primary | ICD-10-CM

## 2025-02-07 DIAGNOSIS — M54.6 CHRONIC LEFT-SIDED THORACIC BACK PAIN: ICD-10-CM

## 2025-02-07 DIAGNOSIS — G89.29 CHRONIC LEFT-SIDED THORACIC BACK PAIN: ICD-10-CM

## 2025-02-07 DIAGNOSIS — M54.2 NECK PAIN, CHRONIC: ICD-10-CM

## 2025-02-07 PROCEDURE — 97110 THERAPEUTIC EXERCISES: CPT

## 2025-02-07 PROCEDURE — 97112 NEUROMUSCULAR REEDUCATION: CPT

## 2025-02-07 NOTE — PROGRESS NOTES
Daily Note     Today's date: 2025  Patient name: Elvira Grant  : 1985  MRN: [id number]  Referring provider: Enrique Colmenares MD  Dx:   Encounter Diagnosis     ICD-10-CM    1. Concussion without loss of consciousness, subsequent encounter  S06.0X0D       2. Chronic left-sided thoracic back pain  M54.6     G89.29       3. Neck pain, chronic  M54.2     G89.29           Start Time: 09  Stop Time: 1015  Total time in clinic (min): 40 minutes    Subjective: Pt reports that he has started re-taking gabapentin, and is feeling good overall coming into today's session. Pt reported that he has a follow up appointment with his concussion specialist at 12, to discuss the results of his assessment he took last week.       Objective: See treatment diary below      Assessment: Pt tolerated treatment well. Added biodex, SLS, tandem stance, and balance beam ambulation to improve pts overall balance both statically and dynamically to improve function with all ADLs. Pt was challenged with biodex, achieving a score of 72% during that first rep, but improve to 84% accuracy with the second rep showing improved overall tolerance and performance. Pt was challenged with tandem stance on the foam with his eyes closed, needing PT to have close supervision due to decreased balance with exercise, as well as frequent use of UE at bar to maintain balance.Pt tolerated both SLS and balance beam ambulation well, being able to complete both exercises with good stability. Patient exhibited good technique with therapeutic exercises and would benefit from continued PT      Plan: Continue per plan of care.  Progress treatment as tolerated.       Precautions:       Date 1/3 1/6 1/17 1/20 1/24 1/27 1/31 2/3   2/7     Visit # 41 42 43 44 45 46 47  48  49     FOTO NV X     DONE             Re-eval                             Focus on NM control of Left scapula and posterior cervical musculature.      Manuals 1/3 1/6 1/17 1/20 1/24 1/27  "1/31 2/3  2/7      T/S Mobe PWK   PWK                 UT/LS Str                       Scapular Mob PWK   PWK   JL 1st rib mob grade III           R Shldr PROM and STM PWK L    PWK L    STM to raji-scap muscles             Cupping    KR scap border    KR scap border                CS gentle traction                       IASTM   KR scap border andL levator                     Median Nerve Flossing                       T spine STM PWK   PWK                 Vestibular Testing                       Neuro Re-Ed 1/3 1/6 1/17 1/20 1/24 1/27 1/31 2/3   2/7     Pt Education                       VOR 1 horizontal                       VOR 1 Vertical                       VOR 2 Horizontal                       VOR 2 Vertical                       Gaze stabilization                       Ulnar Nerve Glide                       Median Nerve Glide                       Y lift        OTB x 10  NV             Chin tucks        5\" x 10  5\"x10 5\" x 15            Prone serratus                        Biodex                  2x maze lvl 4 hard      SLS         3x30 on foam    Balance Beam Ambulation         5 laps foam    Tandem Stance          3x30\" EC on foam    Thoracic ext                        Serratus press                        Doorway Pec Str                       OTB                       SNAG ext                       SNAG rot                        UT/LS Str                       Back                        Wall clocks 15x GTB                     Wall walks  15x GTB                     Deep Neck flex supine           5\" x 10           Foam roll protocol      8'                 Pain/postural ucation, dx edu, vestibular edu                       Ther Ex 1/3 1/6 1/17 1/20 1/24 1/27 1/31 2/3  2/7      UBE 3'/3' 5' post  '3/3' 5' post  3'/3' 5' post  3'/3'    3'/3'     Open Books     10x5\" ea                 Scap stretch            3x30\" and UT 3x30\"            No moneys/ horizontal abd   GTB x 30 ea    GTB x 30 ea  GTB " "x 30 ea GTB x 30  30x GTB         T/S ext, rot                       Tband rows, ext   15# x 30 ea    [place] high rows 8# x 20  Rows/Ext  15# 3x10 ea   2x10 15# ea    20x ea 15# ea     B/l ER             5# 20x    20x 5#     B/l IR             7# 20x    20x 7#     D2 Flexion             20x BlackTb         Pball roll out  10x3\" 3-way  10x10\" with MFD    5x10\" with MFD  3 dir 10\"x5 ea 10x10\" fwd  10x3\" ea    10x3\" ea     Quad Ts, Ys, Ws as able                       Prone I, T, Y 20x ea 3# SB         Bent over Is x3#            Shoulder SHurgs             15x 3#         Scap Squeezes             15x 3#         Scap border stretch    Thoracic rot with MFD                   Ther Activity   1/6 1/17 1/20 1/24 1/27 1/31 2/3        Patient education    KR       KR    KR                                Gait Training   1/6 1/17 1/20 1/24                                                             Modalities   1/6 1/17 1/20 1/24 1/27           MHP     10'     10' with TE                                                         "

## 2025-02-10 ENCOUNTER — OFFICE VISIT (OUTPATIENT)
Dept: PHYSICAL THERAPY | Facility: CLINIC | Age: 40
End: 2025-02-10
Payer: COMMERCIAL

## 2025-02-10 DIAGNOSIS — G89.29 NECK PAIN, CHRONIC: ICD-10-CM

## 2025-02-10 DIAGNOSIS — M54.6 CHRONIC LEFT-SIDED THORACIC BACK PAIN: ICD-10-CM

## 2025-02-10 DIAGNOSIS — G89.29 CHRONIC LEFT-SIDED THORACIC BACK PAIN: ICD-10-CM

## 2025-02-10 DIAGNOSIS — M54.2 NECK PAIN, CHRONIC: ICD-10-CM

## 2025-02-10 DIAGNOSIS — S06.0X0D CONCUSSION WITHOUT LOSS OF CONSCIOUSNESS, SUBSEQUENT ENCOUNTER: ICD-10-CM

## 2025-02-10 DIAGNOSIS — H83.2X3 VESTIBULAR HYPOFUNCTION OF BOTH EARS: Primary | ICD-10-CM

## 2025-02-10 PROCEDURE — 97530 THERAPEUTIC ACTIVITIES: CPT

## 2025-02-10 PROCEDURE — 97110 THERAPEUTIC EXERCISES: CPT

## 2025-02-10 PROCEDURE — 97112 NEUROMUSCULAR REEDUCATION: CPT

## 2025-02-10 NOTE — PROGRESS NOTES
"Daily Note     Today's date: 2/10/2025  Patient name: Elvira Grant  : 1985  MRN: [id number]  Referring provider: Enrique Colmenares MD  Dx:   Encounter Diagnosis     ICD-10-CM    1. Vestibular hypofunction of both ears  H83.2X3       2. Neck pain, chronic  M54.2     G89.29       3. Chronic left-sided thoracic back pain  M54.6     G89.29       4. Concussion without loss of consciousness, subsequent encounter  S06.0X0D                        Subjective: \" My arm has been shaking more\"       Objective: See treatment diary below      Assessment: Elvira presents to therapy with neck pain/ concussion. Discussed vision therapy as per ordering physician. Progressed vestibular exercises and added endurance exercises as per concussion recovery. Mild dizziness noted with horizontal mobility. Note continued gross weakness throughout the periscap and post chain. Tolerated session without adverse effects. Recommend continued skilled therapy to improve overall strength and mobility for functional return with decreased compensation and pain.        Plan: Continue per plan of care.  Progress treatment as tolerated.       Precautions:       Date 1/3 1/6 1/17 1/20 1/24 1/27 1/31 2/3   2/7 2/10    Visit # 41 42 43 44 45 46 47  48  49 50    FOTO NV X     DONE             Re-eval                             Focus on NM control of Left scapula and posterior cervical musculature.      Manuals 1/3 1/6 1/17 1/20 1/24 1/27 1/31 2/3  2/7  2/10    T/S Mobe PWK   PWK                 UT/LS Str                       Scapular Mob PWK   PWK   JL 1st rib mob grade III           R Shldr PROM and STM PWK L    PWK L    STM to raji-scap muscles             Cupping    KR scap border    KR scap border                CS gentle traction                       IASTM   KR scap border andL levator                     Median Nerve Flossing                       T spine STM PWK   PWK                 Vestibular Testing                       Neuro Re-Ed " "1/3 1/6 1/17 1/20 1/24 1/27 1/31 2/3   2/7 2/10    Pt Education                       VOR 1 horizontal                    standing 2 x 10    VOR 1 Vertical                    standing 2x10    VOR 2 Horizontal                       VOR 2 Vertical                       Gaze stabilization                       Y lift        OTB x 10  NV             Chin tucks        5\" x 10  5\"x10 5\" x 15            Prone serratus                        Biodex                  2x maze lvl 4 hard      SLS         3x30 on foam 3x30\" on foam B    Balance Beam Ambulation         5 laps foam    Tandem Stance          3x30\" EC on foam Foam with HT x 15    Wall clocks 15x GTB                     Wall walks  15x GTB                  15x GTB   Deep Neck flex supine           5\" x 10           Foam roll protocol      8'                 Pain/postural ucation, dx edu, vestibular edu                       Ther Ex 1/3 1/6 1/17 1/20 1/24 1/27 1/31 2/3  2/7  2/10    UBE 3'/3' 5' post  '3/3' 5' post  3'/3' 5' post  3'/3'    3'/3'     Open Books     10x5\" ea                 Scap stretch            3x30\" and UT 3x30\"            No moneys/ horizontal abd   GTB x 30 ea    GTB x 30 ea  GTB x 30 ea GTB x 30  30x GTB         T/S ext, rot                       Tband rows, ext   15# x 30 ea    [place] high rows 8# x 20  Rows/Ext  15# 3x10 ea   2x10 15# ea    20x ea 15# ea     B/l ER             5# 20x    20x 5#     B/l IR             7# 20x    20x 7#     D2 Flexion             20x BlackTb         Pball roll out  10x3\" 3-way  10x10\" with MFD    5x10\" with MFD  3 dir 10\"x5 ea 10x10\" fwd  10x3\" ea    10x3\" ea  10x10\"   Quad Ts, Ys, Ws as able                       Prone I, T, Y 20x ea 3# SB         Bent over Is x3#            Shoulder SHurgs             15x 3#         Scap Squeezes             15x 3#         Scap border stretch    Thoracic rot with MFD                3x20\"    Ther Activity   1/6 1/17 1/20 1/24 1/27 1/31 2/3    2/10    Patient education    KR       " ZINA Yu                     6'    Gait Training   1/6 1/17 1/20 1/24                                                             Modalities   1/6 1/17 1/20 1/24 1/27           MHP     10'     10' with TE

## 2025-02-13 ENCOUNTER — DOCUMENTATION (OUTPATIENT)
Age: 40
End: 2025-02-13

## 2025-02-14 ENCOUNTER — OFFICE VISIT (OUTPATIENT)
Dept: PHYSICAL THERAPY | Facility: CLINIC | Age: 40
End: 2025-02-14
Payer: COMMERCIAL

## 2025-02-14 DIAGNOSIS — G89.29 CHRONIC LEFT-SIDED THORACIC BACK PAIN: ICD-10-CM

## 2025-02-14 DIAGNOSIS — M54.2 NECK PAIN, CHRONIC: ICD-10-CM

## 2025-02-14 DIAGNOSIS — H83.2X3 VESTIBULAR HYPOFUNCTION OF BOTH EARS: Primary | ICD-10-CM

## 2025-02-14 DIAGNOSIS — S06.0X0D CONCUSSION WITHOUT LOSS OF CONSCIOUSNESS, SUBSEQUENT ENCOUNTER: ICD-10-CM

## 2025-02-14 DIAGNOSIS — M54.6 CHRONIC LEFT-SIDED THORACIC BACK PAIN: ICD-10-CM

## 2025-02-14 DIAGNOSIS — G89.29 NECK PAIN, CHRONIC: ICD-10-CM

## 2025-02-14 PROCEDURE — 97110 THERAPEUTIC EXERCISES: CPT

## 2025-02-14 PROCEDURE — 97112 NEUROMUSCULAR REEDUCATION: CPT

## 2025-02-14 NOTE — PROGRESS NOTES
Daily Note     Today's date: 2025  Patient name: Elvira Grant  : 1985  MRN: [id number]  Referring provider: Enrique Colmenares MD  Dx:   Encounter Diagnosis     ICD-10-CM    1. Vestibular hypofunction of both ears  H83.2X3       2. Neck pain, chronic  M54.2     G89.29       3. Chronic left-sided thoracic back pain  M54.6     G89.29       4. Concussion without loss of consciousness, subsequent encounter  S06.0X0D           Start Time: 1110  Stop Time: 1150  Total time in clinic (min): 40 minutes    Subjective: Pt reports that he is feeling good overall coming into today's session, noting that he has not done much over the last few days, so his symptoms have decreased coming into today's session.       Objective: See treatment diary below      Assessment: Pt tolerated treatment fair. Pt noted increased symptom intensity in his left scap and globally throughout his left arm after warm up bike. He noted it was a fatigue and tingling sensation down his arm. He noted that ever since he received the injection in his neck the symptom intensity has increased. Though increased symptom intensity pt shows strength and endurance with all shoulder musculature. Pt continues to have increased sway with tandem stance on foam pad with eyes closed, but shows good tolerance to SLS on foam pad due to eyes open during exercise. Pt remains challenged with VOR both horizontally and vertically, with increased symptoms of double vision at end range movements, but not headache or fogginess noted. Patient exhibited good technique with therapeutic exercises and would benefit from continued PT      Plan: Continue per plan of care.  Progress treatment as tolerated.       Precautions:       Date 2/14   1/20 1/24 1/27 1/31 2/3   2/7 2/10    Visit # 51   44 45 46 47  48  49 50    FOTO      DONE             Re-eval                          Focus on NM control of Left scapula and posterior cervical musculature.      Manuals     "1/20 1/24 1/27 1/31 2/3  2/7  2/10    T/S Mobe                    UT/LS Str                    Scapular Mob      JL 1st rib mob grade III           R Shldr PROM and STM      STM to raji-scap muscles             Cupping     KR scap border                CS gentle traction                    IASTM                    Median Nerve Flossing                    T spine STM                    Vestibular Testing                    Neuro Re-Ed 2/14 1/20 1/24 1/27 1/31 2/3   2/7 2/10    Pt Education                    VOR 1 horizontal 3x30\" standing                standing 2 x 10    VOR 1 Vertical 3x30\" standing                standing 2x10    VOR 2 Horizontal                    VOR 2 Vertical                    Gaze stabilization                    Y lift     OTB x 10  NV             Chin tucks     5\" x 10  5\"x10 5\" x 15            Prone serratus                     Biodex               2x maze lvl 4 hard      SLS 3x20\" on foam b/l         3x30 on foam 3x30\" on foam B    Balance Beam Ambulation         5 laps foam    Tandem Stance  3x20\" EC on foam        3x30\" EC on foam Foam with HT x 15    Wall clocks                    Wall walks                  15x GTB   Deep Neck flex supine        5\" x 10           Foam roll protocol                     Pain/postural ucation, dx edu, vestibular edu                    Ther Ex 2/14 1/20 1/24 1/27 1/31 2/3  2/7  2/10    UBE 3'/3'   5' post  3'/3' 5' post  3'/3'    3'/3'     Open Books                    Scap stretch         3x30\" and UT 3x30\"            No moneys/ horizontal abd    GTB x 30 ea  GTB x 30 ea GTB x 30  30x GTB         T/S ext, rot                    Tband rows, ext 20x ea 15#   Jodie high rows 8# x 20  Rows/Ext  15# 3x10 ea   2x10 15# ea    20x ea 15# ea     B/l ER 20x 5#         5# 20x    20x 5#     B/l IR 20x 7#         7# 20x    20x 7#     D2 Flexion          20x BlackTb         Pball roll out  10x10\"   5x10\" with MFD  3 dir 10\"x5 ea 10x10\" fwd  10x3\" ea    10x3\" ea " " 10x10\"   Quad Ts, Ys, Ws as able                    Prone I, T, Y        Bent over Is x3#            Shoulder SHurgs          15x 3#         Scap Squeezes          15x 3#         Scap border stretch  3x20\"                 3x20\"    Ther Activity 2/14   1/20 1/24 1/27  1/31 2/3    2/10    Patient education         KR    KR     KR    Elliptical                  6'    Gait Training    1/20 1/24                                                       Modalities    1/20 1/24 1/27           MHP        10' with TE                                                        "

## 2025-02-17 ENCOUNTER — APPOINTMENT (OUTPATIENT)
Dept: PHYSICAL THERAPY | Facility: CLINIC | Age: 40
End: 2025-02-17
Payer: COMMERCIAL

## 2025-02-17 NOTE — PROGRESS NOTES
Paperwork has been completed and a copy ready to be faxed / picked up. Left message notifying patient of this and advising a $30 fee due. Explained the fee can be paid over the phone or in person - but it has not been sent as of this point.

## 2025-02-21 ENCOUNTER — OFFICE VISIT (OUTPATIENT)
Dept: PHYSICAL THERAPY | Facility: CLINIC | Age: 40
End: 2025-02-21
Payer: COMMERCIAL

## 2025-02-21 DIAGNOSIS — M54.6 CHRONIC LEFT-SIDED THORACIC BACK PAIN: ICD-10-CM

## 2025-02-21 DIAGNOSIS — S06.0X0D CONCUSSION WITHOUT LOSS OF CONSCIOUSNESS, SUBSEQUENT ENCOUNTER: ICD-10-CM

## 2025-02-21 DIAGNOSIS — H83.2X3 VESTIBULAR HYPOFUNCTION OF BOTH EARS: Primary | ICD-10-CM

## 2025-02-21 DIAGNOSIS — G89.29 NECK PAIN, CHRONIC: ICD-10-CM

## 2025-02-21 DIAGNOSIS — M54.2 NECK PAIN, CHRONIC: ICD-10-CM

## 2025-02-21 DIAGNOSIS — G89.29 CHRONIC LEFT-SIDED THORACIC BACK PAIN: ICD-10-CM

## 2025-02-21 PROCEDURE — 97110 THERAPEUTIC EXERCISES: CPT

## 2025-02-21 PROCEDURE — 97140 MANUAL THERAPY 1/> REGIONS: CPT

## 2025-02-21 PROCEDURE — 97112 NEUROMUSCULAR REEDUCATION: CPT

## 2025-02-21 NOTE — PROGRESS NOTES
Daily Note     Today's date: 2025  Patient name: Elvira Grant  : 1985  MRN: [id number]  Referring provider: Enrique Colmenares MD  Dx:   Encounter Diagnosis     ICD-10-CM    1. Vestibular hypofunction of both ears  H83.2X3       2. Neck pain, chronic  M54.2     G89.29       3. Chronic left-sided thoracic back pain  M54.6     G89.29       4. Concussion without loss of consciousness, subsequent encounter  S06.0X0D           Start Time: 930  Stop Time: 1015  Total time in clinic (min): 45 minutes    Subjective: Pt reports that he is having a lot of scapular and cervical pain coming into today's session, with discomfort noted similar to when he started PT.      Objective: See treatment diary below      Assessment: Pt tolerated treatment well. Pt noted that he only had double vision with vertical VOR during today's session, when going upwards, but noted that the further later in the reps and sets, he was able to decrease intensity and width of double vision range. Pt still presents with increased sway and challenge with both SLS  with his eyes open and tandem stance with his eyes closed, but is able to complete exercises with self correction with no falls or need for PT to assist with balance. Pt did need occasional use of UE at bar to counteract LOB early on first rep, but afterwards was able to self correct with no need for UE assistance. Pt responded well to manual STM and stretching, with decreased overall symptom intensity and tension in associated musculature post manual interventions. Patient exhibited good technique with therapeutic exercises and would benefit from continued PT      Plan: Continue per plan of care.  Progress treatment as tolerated.       Precautions:       Date  2/3   2/7 2/10    Visit # 51 52  44 45 46 47  48  49 50    FOTO      DONE             Re-eval                          Focus on NM control of Left scapula and posterior cervical  "musculature.      Manuals 2/14 2/21 1/20 1/24 1/27 1/31 2/3  2/7  2/10    T/S Mobe                    UT/LS Str  PWK                  Scapular Mob  PWK    JL 1st rib mob grade III           R Shldr PROM and STM      STM to raji-scap muscles             Cupping     KR scap border                CS gentle traction                    IASTM                    Median Nerve Flossing                    T spine STM  PWK                  Vestibular Testing                    Neuro Re-Ed 2/14 2/21 1/20 1/24 1/27 1/31 2/3   2/7 2/10    Pt Education                    VOR 1 horizontal 3x30\" standing 3x30\" standing               standing 2 x 10    VOR 1 Vertical 3x30\" standing 3x30\" standing               standing 2x10    VOR 2 Horizontal                    VOR 2 Vertical                    Gaze stabilization                    Y lift     OTB x 10  NV             Chin tucks     5\" x 10  5\"x10 5\" x 15            Prone serratus                     Biodex               2x maze lvl 4 hard      SLS 3x20\" on foam b/l  3x20\" on foam b/l       3x30 on foam 3x30\" on foam B    Balance Beam Ambulation         5 laps foam    Tandem Stance  3x20\" EC on foam 3x20\" EC on foam       3x30\" EC on foam Foam with HT x 15    Wall clocks                    Wall walks                  15x GTB   Deep Neck flex supine        5\" x 10           Foam roll protocol                     Pain/postural ucation, dx edu, vestibular edu                    Ther Ex 2/14 2/21 1/20 1/24 1/27 1/31 2/3  2/7  2/10    UBE 3'/3' 3'/3'  5' post  3'/3' 5' post  3'/3'    3'/3'     Open Books                    Crossbody Str  10x5\" ea           Scap stretch   10x5\" self hug      3x30\" and UT 3x30\"            Median Nerve Glide  20x           No moneys/ horizontal abd    GTB x 30 ea  GTB x 30 ea GTB x 30  30x GTB         T/S ext, rot                    Tband rows, ext 20x ea 15#   Jodie high rows 8# x 20  Rows/Ext  15# 3x10 ea   2x10 15# ea    20x ea 15# ea     B/l ER 20x " "5#         5# 20x    20x 5#     B/l IR 20x 7#         7# 20x    20x 7#     D2 Flexion          20x BlackTb         Pball roll out  10x10\"   5x10\" with MFD  3 dir 10\"x5 ea 10x10\" fwd  10x3\" ea    10x3\" ea  10x10\"   Quad Ts, Ys, Ws as able                    Prone I, T, Y        Bent over Is x3#            Shoulder SHurgs          15x 3#         Scap Squeezes          15x 3#         Scap border stretch  3x20\"                 3x20\"    Ther Activity 2/14   1/20 1/24 1/27  1/31 2/3    2/10    Patient education         KR    KR     KR    Elliptical                  6'    Gait Training    1/20 1/24                                                       Modalities    1/20 1/24 1/27           MHP        10' with TE                                                          "

## 2025-02-26 ENCOUNTER — OFFICE VISIT (OUTPATIENT)
Dept: PHYSICAL THERAPY | Facility: CLINIC | Age: 40
End: 2025-02-26
Payer: COMMERCIAL

## 2025-02-26 DIAGNOSIS — S06.0X0D CONCUSSION WITHOUT LOSS OF CONSCIOUSNESS, SUBSEQUENT ENCOUNTER: ICD-10-CM

## 2025-02-26 DIAGNOSIS — G89.29 NECK PAIN, CHRONIC: ICD-10-CM

## 2025-02-26 DIAGNOSIS — M54.2 NECK PAIN, CHRONIC: ICD-10-CM

## 2025-02-26 DIAGNOSIS — G89.29 CHRONIC LEFT-SIDED THORACIC BACK PAIN: ICD-10-CM

## 2025-02-26 DIAGNOSIS — H83.2X3 VESTIBULAR HYPOFUNCTION OF BOTH EARS: Primary | ICD-10-CM

## 2025-02-26 DIAGNOSIS — M54.6 CHRONIC LEFT-SIDED THORACIC BACK PAIN: ICD-10-CM

## 2025-02-26 PROCEDURE — 97010 HOT OR COLD PACKS THERAPY: CPT

## 2025-02-26 PROCEDURE — 97112 NEUROMUSCULAR REEDUCATION: CPT

## 2025-02-26 PROCEDURE — 97140 MANUAL THERAPY 1/> REGIONS: CPT

## 2025-02-26 PROCEDURE — 97110 THERAPEUTIC EXERCISES: CPT

## 2025-02-26 NOTE — PROGRESS NOTES
Daily Note     Today's date: 2025  Patient name: Elvira Grant  : 1985  MRN: [id number]  Referring provider: Enrique Colmenares MD  Dx:   Encounter Diagnosis     ICD-10-CM    1. Vestibular hypofunction of both ears  H83.2X3       2. Neck pain, chronic  M54.2     G89.29       3. Chronic left-sided thoracic back pain  M54.6     G89.29       4. Concussion without loss of consciousness, subsequent encounter  S06.0X0D           Start Time: 1600  Stop Time: 1705  Total time in clinic (min): 65 minutes    Subjective: Pt reports that the whole left side of his upper and mid back are tensed up and very painful coming into today's session. Pt reported that he was working over the weekend and past few days which strained his back significantly. Pt noted that he got cleared for light duty at work, and is starting that next week.       Objective: See treatment diary below      Assessment: Pt tolerated treatment well. Utilized MHP to upper and mid back with pt laying in the prone position after warm up, to attempt to reduce overall tension in upper spine and overall symptom intensity noted coming into today's session. Performed several manual STM, IASTM with massage gun, and stretching to attempt to further reduce tension in all associated spinal musculature. Pt responded well to manual interventions and MHP, with remainder of session focused on stretching and other symptom reduction based exercises. Monitor how pt is feeling next session and adjust session as appropriate. Patient exhibited good technique with therapeutic exercises and would benefit from continued PT      Plan: Continue per plan of care.  Progress treatment as tolerated.       Precautions:       Date 2/14 2/21 2/26    1/31 2/3   2/7 2/10    Visit # 51 52 53    47  48  49 50    FOTO                 Re-eval                       Focus on NM control of Left scapula and posterior cervical musculature.      Manuals 2/14 2/21 2/26    1/31 2/3  2/7   "2/10    T/S Mobe   PWK j-scoop ctj grade 5              UT/LS Str  PWK PWK              Scapular Mob  PWK PWK              Cervical Mob   PWK gentle          R Shldr PROM and STM                 Cupping                  CS gentle traction   PWK              IASTM   PWK (gun)              Median Nerve Flossing                 T spine STM  PWK PWK              Vestibular Testing                 Neuro Re-Ed 2/14 2/21 2/26    1/31 2/3   2/7 2/10    Pt Education                 VOR 1 horizontal 3x30\" standing 3x30\" standing            standing 2 x 10    VOR 1 Vertical 3x30\" standing 3x30\" standing            standing 2x10    VOR 2 Horizontal                 VOR 2 Vertical                 Gaze stabilization                 Y lift                  Chin tucks                  Prone serratus                  Biodex            2x maze lvl 4 hard      SLS 3x20\" on foam b/l  3x20\" on foam b/l       3x30 on foam 3x30\" on foam B    Balance Beam Ambulation         5 laps foam    Tandem Stance  3x20\" EC on foam 3x20\" EC on foam       3x30\" EC on foam Foam with HT x 15    Wall clocks                 Wall walks               15x GTB   Deep Neck flex supine                 Foam roll protocol    8'               Pain/postural ucation, dx edu, vestibular edu                 Ther Ex 2/14 2/21 2/26    1/31 2/3  2/7  2/10    UBE 3'/3' 3'/3' 3'/3'    3'/3'    3'/3'     Open Books   15x3\"              Crossbody Str  10x5\" ea 10x5\" ea          Scap stretch   10x5\" self hug 10x5\" self hug              Median Nerve Glide  20x           No moneys/ horizontal abd       30x GTB         T/S ext, rot                 Tband rows, ext 20x ea 15#      2x10 15# ea    20x ea 15# ea     B/l ER 20x 5#      5# 20x    20x 5#     B/l IR 20x 7#      7# 20x    20x 7#     D2 Flexion       20x BlackTb         Pball roll out  10x10\"  10x3\" 3-way    10x3\" ea    10x3\" ea  10x10\"   Quad Ts, Ys, Ws as able                 Prone I, T, Y                 Shoulder SHurgs   " "    15x 3#         Scap Squeezes       15x 3#         Scap border stretch  3x20\"              3x20\"    Ther Activity 2/14      1/31 2/3    2/10    Patient education           ZINA Yu               6'    Gait Training                                                     Modalities   2/26              [facility]   10   prone                                                           "

## 2025-02-28 ENCOUNTER — OFFICE VISIT (OUTPATIENT)
Dept: PHYSICAL THERAPY | Facility: CLINIC | Age: 40
End: 2025-02-28
Payer: COMMERCIAL

## 2025-02-28 DIAGNOSIS — S06.0X0D CONCUSSION WITHOUT LOSS OF CONSCIOUSNESS, SUBSEQUENT ENCOUNTER: ICD-10-CM

## 2025-02-28 DIAGNOSIS — M54.6 CHRONIC LEFT-SIDED THORACIC BACK PAIN: ICD-10-CM

## 2025-02-28 DIAGNOSIS — H83.2X3 VESTIBULAR HYPOFUNCTION OF BOTH EARS: Primary | ICD-10-CM

## 2025-02-28 DIAGNOSIS — G89.29 CHRONIC LEFT-SIDED THORACIC BACK PAIN: ICD-10-CM

## 2025-02-28 DIAGNOSIS — M54.2 NECK PAIN, CHRONIC: ICD-10-CM

## 2025-02-28 DIAGNOSIS — G89.29 NECK PAIN, CHRONIC: ICD-10-CM

## 2025-02-28 PROCEDURE — 97112 NEUROMUSCULAR REEDUCATION: CPT

## 2025-02-28 PROCEDURE — 97110 THERAPEUTIC EXERCISES: CPT

## 2025-02-28 PROCEDURE — 97010 HOT OR COLD PACKS THERAPY: CPT

## 2025-02-28 PROCEDURE — 97140 MANUAL THERAPY 1/> REGIONS: CPT

## 2025-02-28 NOTE — PROGRESS NOTES
Daily Note     Today's date: 2025  Patient name: Elvira Grant  : 1985  MRN: [id number]  Referring provider: Enrique Colmenares MD  Dx:   Encounter Diagnosis     ICD-10-CM    1. Vestibular hypofunction of both ears  H83.2X3       2. Neck pain, chronic  M54.2     G89.29       3. Chronic left-sided thoracic back pain  M54.6     G89.29       4. Concussion without loss of consciousness, subsequent encounter  S06.0X0D           Start Time: 1005  Stop Time: 1055  Total time in clinic (min): 50 minutes    Subjective: Pt reports that he responded very well to last PT session, with reduced overall symptom intensity. Pt requested a similar session today, due to a full duty work shift tomorrow and , with light duty staring on Monday.       Objective: See treatment diary below      Assessment: Pt tolerated treatment well. Session intenisty was kept as the same as previous visit per patient's request due to having very successful reduction in overall symptom intensity as well as needing to working full duty work shift tomorrow and not wanting to be sore going into it. Pt responded well to all stretching, manual interventions, and MHP, with further symptom reduction and decreased tension in associated musculature post session.  Patient exhibited good technique with therapeutic exercises and would benefit from continued PT      Plan: Continue per plan of care.  Progress treatment as tolerated.       Precautions:       Date 2/14 2/21 2/26    1/31 2/3   2/7 2/10    Visit # 51 52 53    47  48  49 50    FOTO                 Re-eval                       Focus on NM control of Left scapula and posterior cervical musculature.      Manuals 2/14 2/21 2/26 2/28   1/31 2/3  2/7  2/10    T/S Mobe   PWK j-scoop ctj grade 5 PWK j-scoop CTJ             UT/LS Str  PWK PWK              Scapular Mob  PWK PWK PWK             Cervical Mob   PWK gentle PWK gentle         R Shldr PROM and STM                 Cupping              "     CS gentle traction   PWK PWK             IASTM   PWK (gun) PWK (gun)              Median Nerve Flossing                 T spine STM  PWK PWK              Vestibular Testing                 Neuro Re-Ed 2/14 2/21 2/26 2/28   1/31 2/3   2/7 2/10    Pt Education                 VOR 1 horizontal 3x30\" standing 3x30\" standing            standing 2 x 10    VOR 1 Vertical 3x30\" standing 3x30\" standing            standing 2x10    VOR 2 Horizontal                 VOR 2 Vertical                 Gaze stabilization                 Y lift                  Chin tucks                  Prone serratus                  Biodex            2x maze lvl 4 hard      SLS 3x20\" on foam b/l  3x20\" on foam b/l       3x30 on foam 3x30\" on foam B    Balance Beam Ambulation         5 laps foam    Tandem Stance  3x20\" EC on foam 3x20\" EC on foam       3x30\" EC on foam Foam with HT x 15    Wall clocks                 Wall walks               15x GTB   Deep Neck flex supine                 Foam roll protocol    8'  8'             Pain/postural ucation, dx edu, vestibular edu                 Ther Ex 2/14 2/21 2/26 2/28   1/31 2/3  2/7  2/10    UBE 3'/3' 3'/3' 3'/3' 3'/3'   3'/3'    3'/3'     Open Books   15x3\" 10x3\"  L             Crossbody Str  10x5\" ea 10x5\" ea 10x5\"          Scap stretch   10x5\" self hug 10x5\" self hug 10x5\"             Median Nerve Glide  20x           No moneys/ horizontal abd       30x GTB         T/S ext, rot                 Tband rows, ext 20x ea 15#      2x10 15# ea    20x ea 15# ea     B/l ER 20x 5#      5# 20x    20x 5#     B/l IR 20x 7#      7# 20x    20x 7#     D2 Flexion       20x BlackTb         Pball roll out  10x10\"  10x3\" 3-way    10x3\" ea    10x3\" ea  10x10\"   Quad Ts, Ys, Ws as able                 Prone I, T, Y                 Shoulder SHurgs       15x 3#         Scap Squeezes       15x 3#         Scap border stretch  3x20\"              3x20\"    Ther Activity 2/14 1/31 2/3    2/10    Patient education    "        ZINA Yu               6'    Gait Training                                                     Modalities   2/26 2/28             MHP   10   prone 10' prone

## 2025-03-07 ENCOUNTER — APPOINTMENT (OUTPATIENT)
Dept: PHYSICAL THERAPY | Facility: CLINIC | Age: 40
End: 2025-03-07
Payer: COMMERCIAL

## 2025-03-12 ENCOUNTER — OFFICE VISIT (OUTPATIENT)
Dept: PHYSICAL THERAPY | Facility: CLINIC | Age: 40
End: 2025-03-12
Payer: COMMERCIAL

## 2025-03-12 DIAGNOSIS — M54.6 CHRONIC LEFT-SIDED THORACIC BACK PAIN: ICD-10-CM

## 2025-03-12 DIAGNOSIS — G89.29 NECK PAIN, CHRONIC: ICD-10-CM

## 2025-03-12 DIAGNOSIS — G89.29 CHRONIC LEFT-SIDED THORACIC BACK PAIN: ICD-10-CM

## 2025-03-12 DIAGNOSIS — M54.2 NECK PAIN, CHRONIC: ICD-10-CM

## 2025-03-12 DIAGNOSIS — H83.2X3 VESTIBULAR HYPOFUNCTION OF BOTH EARS: Primary | ICD-10-CM

## 2025-03-12 DIAGNOSIS — S06.0X0D CONCUSSION WITHOUT LOSS OF CONSCIOUSNESS, SUBSEQUENT ENCOUNTER: ICD-10-CM

## 2025-03-12 PROCEDURE — 97110 THERAPEUTIC EXERCISES: CPT

## 2025-03-12 PROCEDURE — 97010 HOT OR COLD PACKS THERAPY: CPT

## 2025-03-12 PROCEDURE — 97140 MANUAL THERAPY 1/> REGIONS: CPT

## 2025-03-12 PROCEDURE — 97112 NEUROMUSCULAR REEDUCATION: CPT

## 2025-03-12 NOTE — PROGRESS NOTES
Daily Note     Today's date: 3/12/2025  Patient name: Elvira Grant  : 1985  MRN: [id number]  Referring provider: Enrique Colmenares MD  Dx:   Encounter Diagnosis     ICD-10-CM    1. Vestibular hypofunction of both ears  H83.2X3       2. Neck pain, chronic  M54.2     G89.29       3. Chronic left-sided thoracic back pain  M54.6     G89.29       4. Concussion without loss of consciousness, subsequent encounter  S06.0X0D           Start Time: 1630  Stop Time: 1735  Total time in clinic (min): 65 minutes    Subjective: Pt reports that since transferring to light duty at work for the last 2 weeks since he has been away from PT, his symptoms and pain have intensified. He noted that he is haivng pain on both sides of his neck, and the left side of both the full duration of his thoracic and lumbar spine. Pt reports that his light duty work is lighter weight per object, but he needs to lift more objects, putting more strain than normal duty was.       Objective: See treatment diary below      Assessment: Pt tolerated treatment well. Due to pt having successful reduction in overall symptom intensity the previous two PT session, today's session was kept with the same interventions to attempt to decrease pts noted increased symptom intensity coming into session. Pt responded well to manual intecetions, MHP, and stretching again today, with noted overall reduction in symptom intensity. Progress and modify sessions as pt tolerates. Patient exhibited good technique with therapeutic exercises and would benefit from continued PT      Plan: Continue per plan of care.  Progress treatment as tolerated.       Precautions:       Date 2/14 2/21 2/26 2/28 3/12  1/31 2/3   2/7 2/10    Visit # 51 52 53 54 55  47  48  49 50    FOTO     NV            Re-eval                       Focus on NM control of Left scapula and posterior cervical musculature.      Manuals 2/14 2/21 2/26 2/28 3/12  1/31 2/3  2/7  2/10    T/S Karlee LOTT  "j-scoop ctj grade 5 PWK j-scoop CTJ PWK j-scoop CTJ            UT/LS Str  PWK PWK              Scapular Mob  PWK PWK PWK PWK gentle            Cervical Mob   PWK gentle PWK gentle PWK gentle        R Shldr PROM and STM                 Cupping                  CS gentle traction   PWK PWK PWK            IASTM   PWK (gun) PWK (gun)              Median Nerve Flossing                 T spine STM  PWK PWK  PWK            Cervical Traction     NV        Vestibular Testing                 Neuro Re-Ed 2/14 2/21 2/26 2/28 3/12  1/31 2/3   2/7 2/10    Pt Education                 VOR 1 horizontal 3x30\" standing 3x30\" standing            standing 2 x 10    VOR 1 Vertical 3x30\" standing 3x30\" standing            standing 2x10    VOR 2 Horizontal                 VOR 2 Vertical                 Gaze stabilization                 Y lift                  Chin tucks                  Prone serratus                  Biodex            2x maze lvl 4 hard      SLS 3x20\" on foam b/l  3x20\" on foam b/l       3x30 on foam 3x30\" on foam B    Balance Beam Ambulation         5 laps foam    Tandem Stance  3x20\" EC on foam 3x20\" EC on foam       3x30\" EC on foam Foam with HT x 15    Wall clocks                 Wall walks               15x GTB   Deep Neck flex supine                 Foam roll protocol    8'  8' 8'            Pain/postural ucation, dx edu, vestibular edu                 Ther Ex 2/14 2/21 2/26 2/28 3/12  1/31 2/3  2/7  2/10    UBE 3'/3' 3'/3' 3'/3' 3'/3' 3'/3'  3'/3'    3'/3'     Open Books   15x3\" 10x3\"  L 10x3\" L            Crossbody Str  10x5\" ea 10x5\" ea 10x5\"  10x3\" L        Scap stretch   10x5\" self hug 10x5\" self hug 10x5\" 105\"             Median Nerve Glide  20x           No moneys/ horizontal abd       30x GTB         T/S ext, rot                 Tband rows, ext 20x ea 15#      2x10 15# ea    20x ea 15# ea     B/l ER 20x 5#      5# 20x    20x 5#     B/l IR 20x 7#      7# 20x    20x 7#     D2 Flexion       20x BlackTb       " "  Pball roll out  10x10\"  10x3\" 3-way    10x3\" ea    10x3\" ea  10x10\"   Quad Ts, Ys, Ws as able                 Prone I, T, Y                 Shoulder SHurgs       15x 3#         Scap Squeezes       15x 3#         Scap border stretch  3x20\"              3x20\"    Ther Activity 2/14      1/31 2/3    2/10    Patient education           KR     KR    Elliptical               6'    Gait Training                                                     Modalities   2/26 2/28 3/12            MHP   10   prone 10' prone 10' prone                                                             "

## 2025-03-14 ENCOUNTER — OFFICE VISIT (OUTPATIENT)
Dept: PHYSICAL THERAPY | Facility: CLINIC | Age: 40
End: 2025-03-14
Payer: COMMERCIAL

## 2025-03-14 DIAGNOSIS — G89.29 NECK PAIN, CHRONIC: ICD-10-CM

## 2025-03-14 DIAGNOSIS — S06.0X0D CONCUSSION WITHOUT LOSS OF CONSCIOUSNESS, SUBSEQUENT ENCOUNTER: ICD-10-CM

## 2025-03-14 DIAGNOSIS — M54.6 CHRONIC LEFT-SIDED THORACIC BACK PAIN: ICD-10-CM

## 2025-03-14 DIAGNOSIS — G89.29 CHRONIC LEFT-SIDED THORACIC BACK PAIN: ICD-10-CM

## 2025-03-14 DIAGNOSIS — H83.2X3 VESTIBULAR HYPOFUNCTION OF BOTH EARS: Primary | ICD-10-CM

## 2025-03-14 DIAGNOSIS — M54.2 NECK PAIN, CHRONIC: ICD-10-CM

## 2025-03-14 PROCEDURE — 97140 MANUAL THERAPY 1/> REGIONS: CPT

## 2025-03-14 PROCEDURE — 97110 THERAPEUTIC EXERCISES: CPT

## 2025-03-14 NOTE — PROGRESS NOTES
Daily Note     Today's date: 3/14/2025  Patient name: Elvira Grant  : 1985  MRN: [id number]  Referring provider: Enrique Colmenares MD  Dx:   Encounter Diagnosis     ICD-10-CM    1. Vestibular hypofunction of both ears  H83.2X3       2. Neck pain, chronic  M54.2     G89.29       3. Chronic left-sided thoracic back pain  M54.6     G89.29       4. Concussion without loss of consciousness, subsequent encounter  S06.0X0D           Start Time: 935  Stop Time:   Total time in clinic (min): 40 minutes    Subjective: Pt reports that overal he is feeling physically better since last PT session, with less intense pain reported.       Objective: See treatment diary below      Assessment: Pt tolerated treatment well. Re inrtoduced several strengthening exercises, at reduced intensity as previouslly performed during today's session to continue to build that overall strength and endurance of all shoulder, rotator cuff, scapular stabilization, and postural musculature. Pt tolerated all exercises well, noting good strength with all exercise, but did have increased symptom itnenistyy post strengthening exercise. Performed manual STM, mobilizations, and stretching which pt responded well to, with successful reduction in overall symptoms post session. Patient exhibited good technique with therapeutic exercises and would benefit from continued PT      Plan: Continue per plan of care.  Progress treatment as tolerated.       Precautions:       Date 2/14 2/21 2/26 2/28 3/12 3/14    2/10    Visit # [phone] 56    50    FOTO     NV NV        Re-eval                    Focus on NM control of Left scapula and posterior cervical musculature.      Manuals 2/14 2/21 2/26 2/28 3/12 3/14    2/10    T/S Mobe   PWK j-scoop ctj grade 5 PWK j-scoop CTJ PWK j-scoop CTJ PWK j-scoop        UT/LS Str  PWK PWK           Scapular Mob  PWK PWK PWK PWK gentle         Cervical Mob   PWK gentle PWK gentle PWK gentle PWK gentle       R  "Shldr PROM and STM              Cupping               CS gentle traction   PWK PWK PWK PWK        IASTM   PWK (gun) PWK (gun)           Median Nerve Flossing              T spine STM  PWK PWK  PWK PWK        Cervical Traction     NV NV       Vestibular Testing              Neuro Re-Ed 2/14 2/21 2/26 2/28 3/12 3/14    2/10    Pt Education              VOR 1 horizontal 3x30\" standing 3x30\" standing         standing 2 x 10    VOR 1 Vertical 3x30\" standing 3x30\" standing         standing 2x10    VOR 2 Horizontal              VOR 2 Vertical              Gaze stabilization              Y lift               Chin tucks               Prone serratus               Biodex              SLS 3x20\" on foam b/l  3x20\" on foam b/l        3x30\" on foam B    Balance Beam Ambulation             Tandem Stance  3x20\" EC on foam 3x20\" EC on foam        Foam with HT x 15    Wall clocks              Wall walks            15x GTB   Deep Neck flex supine              Foam roll protocol    8'  8' 8'         Pain/postural ucation, dx edu, vestibular edu              Ther Ex 2/14 2/21 2/26 2/28 3/12 3/14    2/10    UBE 3'/3' 3'/3' 3'/3' 3'/3' 3'/3' 3'/3'        Open Books   15x3\" 10x3\"  L 10x3\" L         Crossbody Str  10x5\" ea 10x5\" ea 10x5\"  10x3\" L        Scap stretch   10x5\" self hug 10x5\" self hug 10x5\" 105\"          Median Nerve Glide  20x           No moneys/ horizontal abd              T/S ext, rot              Tband rows, ext 20x ea 15#     20x ea 12#        B/l ER 20x 5#     20x 5#         B/l IR 20x 7#     20x 7#        D2 Flexion      20x black TB        Pball roll out  10x10\"  10x3\" 3-way        10x10\"   Quad Ts, Ys, Ws as able              Prone I, T, Y              Shoulder SHurgs      15x 3#        Scap Squeezes      15x 3#        Scap border stretch  3x20\"           3x20\"    Ther Activity 2/14         2/10    Patient education            KR    Elliptical            6'    Gait Training                                               "       Modalities   2/26 2/28 3/12            MHP   10   prone 10' prone 10' prone

## 2025-03-19 ENCOUNTER — OFFICE VISIT (OUTPATIENT)
Dept: PHYSICAL THERAPY | Facility: CLINIC | Age: 40
End: 2025-03-19
Payer: COMMERCIAL

## 2025-03-19 DIAGNOSIS — S06.0X0D CONCUSSION WITHOUT LOSS OF CONSCIOUSNESS, SUBSEQUENT ENCOUNTER: ICD-10-CM

## 2025-03-19 DIAGNOSIS — H83.2X3 VESTIBULAR HYPOFUNCTION OF BOTH EARS: Primary | ICD-10-CM

## 2025-03-19 DIAGNOSIS — G89.29 CHRONIC LEFT-SIDED THORACIC BACK PAIN: ICD-10-CM

## 2025-03-19 DIAGNOSIS — G89.29 NECK PAIN, CHRONIC: ICD-10-CM

## 2025-03-19 DIAGNOSIS — M54.6 CHRONIC LEFT-SIDED THORACIC BACK PAIN: ICD-10-CM

## 2025-03-19 DIAGNOSIS — M54.2 NECK PAIN, CHRONIC: ICD-10-CM

## 2025-03-19 PROCEDURE — 97110 THERAPEUTIC EXERCISES: CPT

## 2025-03-19 NOTE — PROGRESS NOTES
Daily Note     Today's date: 3/19/2025  Patient name: Elvira Grant  : 1985  MRN: [id number]  Referring provider: Enrique Colmenares MD  Dx:   Encounter Diagnosis     ICD-10-CM    1. Vestibular hypofunction of both ears  H83.2X3       2. Neck pain, chronic  M54.2     G89.29       3. Chronic left-sided thoracic back pain  M54.6     G89.29       4. Concussion without loss of consciousness, subsequent encounter  S06.0X0D           Start Time: 1632  Stop Time: 1710  Total time in clinic (min): 38 minutes    Subjective: Pt reports that he is feeling good overall coming into today's session, noting that he has done minimal work since Monday due to his work putting him on short term disability.       Objective: See treatment diary below      Assessment: Pt tolerated treatment well. Continued to progress both resistance and duration of exercises during today PT session in order to improve the strength, endurance,and tolerance to activities globally throughout targeted muscle groups. Pt was challenged with several of the progressions made during the session, but was able to maintain proper form and minimal exacerbation of symptoms throughout all exercises and post session. Patient exhibited good technique with therapeutic exercises and would benefit from continued PT      Plan: Continue per plan of care.  Progress treatment as tolerated.       Precautions:       Date 2/14 2/21 2/26 2/28 3/12 3/14 3/19   2/10    Visit # 51 52 53 54 [phone] 57   50    FOTO     NV NV XXX       Re-eval                    Focus on NM control of Left scapula and posterior cervical musculature.      Manuals 2/14 2/21 2/26 2/28 3/12 3/14 3/19   2/10    T/S Mobe   PWK j-scoop ctj grade 5 PWK j-scoop CTJ PWK j-scoop CTJ PWK j-scoop        UT/LS Str  PWK PWK           Scapular Mob  PWK PWK PWK PWK gentle         Cervical Mob   PWK gentle PWK gentle PWK gentle PWK gentle       R Shldr PROM and STM              Cupping               CS gentle  "traction   PWK PWK PWK PWK        IASTM   PWK (gun) PWK (gun)           Median Nerve Flossing              T spine STM  PWK PWK  PWK PWK        Cervical Traction     NV NV       Vestibular Testing              Neuro Re-Ed 2/14 2/21 2/26 2/28 3/12 3/14 3/19   2/10    Pt Education              VOR 1 horizontal 3x30\" standing 3x30\" standing         standing 2 x 10    VOR 1 Vertical 3x30\" standing 3x30\" standing         standing 2x10    VOR 2 Horizontal              VOR 2 Vertical              Gaze stabilization              Y lift               Chin tucks               Prone serratus               Biodex              SLS 3x20\" on foam b/l  3x20\" on foam b/l        3x30\" on foam B    Balance Beam Ambulation             Tandem Stance  3x20\" EC on foam 3x20\" EC on foam        Foam with HT x 15    Wall clocks              Wall walks            15x GTB   Deep Neck flex supine              Foam roll protocol    8'  8' 8'         Pain/postural ucation, dx edu, vestibular edu              Ther Ex 2/14 2/21 2/26 2/28 3/12 3/14 3/19   2/10    UBE 3'/3' 3'/3' 3'/3' 3'/3' 3'/3' 3'/3' 3'/3'       Open Books   15x3\" 10x3\"  L 10x3\" L         Crossbody Str  10x5\" ea 10x5\" ea 10x5\"  10x3\" L        Scap stretch   10x5\" self hug 10x5\" self hug 10x5\" 105\"          Median Nerve Glide  20x           No moneys/ horizontal abd       30x GTB ea       T/S ext, rot              Tband rows, ext 20x ea 15#     20x ea 12# 20x ea 15#       B/l ER 20x 5#     20x 5#  20x 6#       B/l IR 20x 7#     20x 7# 20x 8#       D2 Flexion      20x black TB 30x black TB       Pball roll out  10x10\"  10x3\" 3-way        10x10\"   Quad Ts, Ys, Ws as able              Prone I, T, Y       20x ea SB 3#       Shoulder SHurgs      15x 3# 15x3\" 3#       Scap Squeezes      15x 3# 15x3\" 3#       Scap border stretch  3x20\"           3x20\"    Ther Activity 2/14         2/10    Patient education            KR    Elliprachana            6'    Gait Training                          "                            Modalities   2/26 2/28 3/12  3/19          MHP   10   prone 10' prone 10' prone

## 2025-03-21 ENCOUNTER — OFFICE VISIT (OUTPATIENT)
Dept: PHYSICAL THERAPY | Facility: CLINIC | Age: 40
End: 2025-03-21
Payer: COMMERCIAL

## 2025-03-21 DIAGNOSIS — G89.29 CHRONIC LEFT-SIDED THORACIC BACK PAIN: ICD-10-CM

## 2025-03-21 DIAGNOSIS — M54.6 CHRONIC LEFT-SIDED THORACIC BACK PAIN: ICD-10-CM

## 2025-03-21 DIAGNOSIS — G89.29 NECK PAIN, CHRONIC: Primary | ICD-10-CM

## 2025-03-21 DIAGNOSIS — M54.2 NECK PAIN, CHRONIC: Primary | ICD-10-CM

## 2025-03-21 PROCEDURE — 97110 THERAPEUTIC EXERCISES: CPT

## 2025-03-21 PROCEDURE — 97010 HOT OR COLD PACKS THERAPY: CPT

## 2025-03-21 NOTE — PROGRESS NOTES
Daily Note     Today's date: 3/21/2025  Patient name: Elvira Grant  : 1985  MRN: [id number]  Referring provider: Enrique Colmenares MD  Dx:   Encounter Diagnosis     ICD-10-CM    1. Neck pain, chronic  M54.2     G89.29       2. Chronic left-sided thoracic back pain  M54.6     G89.29           Start Time: 935  Stop Time: 1025  Total time in clinic (min): 50 minutes    Subjective: Pt reports that he has slight increased soreness coming into today's session both due to last PT session and performing HEP.       Objective: See treatment diary below      Assessment: Pt tolerated treatment well. Added and progressed several exercises during today's session to continue to improve overall strengthen endurance of all shoulder, rotator cuff, scapular stabilization, and postural musculature. Added serratus punches and zhao raises with 4# dumbbells. Progressed scapular squeezes and shoulder shrugs from 3# during last session and 4# during today's session. At end of PT session utilized MHP due to increased tension and slight exacerbation of symptom intensity after PT session.Patient exhibited good technique with therapeutic exercises and would benefit from continued PT      Plan: Continue per plan of care.  Progress treatment as tolerated.       Precautions:       Date 2/14 2/21 2/26 2/28 3/12 3/14 3/19 3/21  2/10    Visit # 51 52 53 54 55 56 57 58  50    FOTO     NV NV XXX       Re-eval                    Focus on NM control of Left scapula and posterior cervical musculature.      Manuals 2/14 2/21 2/26 2/28 3/12 3/14 3/19 3/21  2/10    T/S Mobe   PWK j-scoop ctj grade 5 PWK j-scoop CTJ PWK j-scoop CTJ PWK j-scoop        UT/LS Str  PWK PWK           Scapular Mob  PWK PWK PWK PWK gentle         Cervical Mob   PWK gentle PWK gentle PWK gentle PWK gentle       R Shldr PROM and STM              Cupping               CS gentle traction   PWK PWK PWK PWK        IASTM   PWK (gun) PWK (gun)           Median Nerve  "Flossing              T spine STM  PWK PWK  PWK PWK        Cervical Traction     NV NV       Vestibular Testing              Neuro Re-Ed 2/14 2/21 2/26 2/28 3/12 3/14 3/19 3/21  2/10    Pt Education              VOR 1 horizontal 3x30\" standing 3x30\" standing         standing 2 x 10    VOR 1 Vertical 3x30\" standing 3x30\" standing         standing 2x10    VOR 2 Horizontal              VOR 2 Vertical              Gaze stabilization              Y lift               Chin tucks               Prone serratus               Biodex              SLS 3x20\" on foam b/l  3x20\" on foam b/l        3x30\" on foam B    Balance Beam Ambulation             Tandem Stance  3x20\" EC on foam 3x20\" EC on foam        Foam with HT x 15    Wall clocks              Wall walks            15x GTB   Deep Neck flex supine              Foam roll protocol    8'  8' 8'         Pain/postural ucation, dx edu, vestibular edu              Ther Ex 2/14 2/21 2/26 2/28 3/12 3/14 3/19 3/21  2/10    UBE 3'/3' 3'/3' 3'/3' 3'/3' 3'/3' 3'/3' 3'/3' 3'/3'      Open Books   15x3\" 10x3\"  L 10x3\" L         Crossbody Str  10x5\" ea 10x5\" ea 10x5\"  10x3\" L        Scap stretch   10x5\" self hug 10x5\" self hug 10x5\" 105\"          Median Nerve Glide  20x           No moneys/ horizontal abd       30x GTB ea       T/S ext, rot              Tband rows, ext 20x ea 15#     20x ea 12# 20x ea 15# 20x ea 15#      B/l ER 20x 5#     20x 5#  20x 6# 20x 6#      B/l IR 20x 7#     20x 7# 20x 8# 20x 8#      D2 Flexion      20x black TB 30x black TB 30x blackTB      Pball roll out  10x10\"  10x3\" 3-way        10x10\"   Quad Ts, Ys, Ws as able              Prone I, T, Y       20x ea SB 3# 20x ea SB 3#      Shoulder SHurgs      15x 3# 15x3\" 3# 20x3\" 4#      Scap Squeezes      15x 3# 15x3\" 3# 20x3\" 4#      Coleman Raises        20x 4#     Scap Punches        20x 4#     Scap border stretch  3x20\"           3x20\"    Ther Activity 2/14         2/10    Patient education            KR    Elliptical     "        6'    Gait Training                                                     Modalities   2/26 2/28 3/12  3/19  3/21        MHP   10   prone 10' prone 10' prone     10' prone

## 2025-03-25 ENCOUNTER — TELEPHONE (OUTPATIENT)
Age: 40
End: 2025-03-25

## 2025-03-25 ENCOUNTER — OFFICE VISIT (OUTPATIENT)
Dept: PHYSICAL THERAPY | Facility: CLINIC | Age: 40
End: 2025-03-25
Payer: COMMERCIAL

## 2025-03-25 DIAGNOSIS — G89.29 CHRONIC LEFT-SIDED THORACIC BACK PAIN: ICD-10-CM

## 2025-03-25 DIAGNOSIS — M54.6 CHRONIC LEFT-SIDED THORACIC BACK PAIN: ICD-10-CM

## 2025-03-25 DIAGNOSIS — G89.29 NECK PAIN, CHRONIC: Primary | ICD-10-CM

## 2025-03-25 DIAGNOSIS — S06.0X0D CONCUSSION WITHOUT LOSS OF CONSCIOUSNESS, SUBSEQUENT ENCOUNTER: ICD-10-CM

## 2025-03-25 DIAGNOSIS — M54.2 NECK PAIN, CHRONIC: Primary | ICD-10-CM

## 2025-03-25 DIAGNOSIS — H83.2X3 VESTIBULAR HYPOFUNCTION OF BOTH EARS: ICD-10-CM

## 2025-03-25 PROCEDURE — 97112 NEUROMUSCULAR REEDUCATION: CPT

## 2025-03-25 PROCEDURE — 97010 HOT OR COLD PACKS THERAPY: CPT

## 2025-03-25 PROCEDURE — 97140 MANUAL THERAPY 1/> REGIONS: CPT

## 2025-03-25 PROCEDURE — 97110 THERAPEUTIC EXERCISES: CPT

## 2025-03-25 NOTE — PROGRESS NOTES
Daily Note     Today's date: 3/25/2025  Patient name: Elvira Grant  : 1985  MRN: [id number]  Referring provider: Enrique Colmenares MD  Dx:   Encounter Diagnosis     ICD-10-CM    1. Neck pain, chronic  M54.2     G89.29       2. Chronic left-sided thoracic back pain  M54.6     G89.29       3. Vestibular hypofunction of both ears  H83.2X3       4. Concussion without loss of consciousness, subsequent encounter  S06.0X0D           Start Time: 905  Stop Time: 955  Total time in clinic (min): 50 minutes    Subjective: Pt reports that he is having strong pain in his cervical and upper thoaracic spine coming into today's session. HE noted that it started over the weekend, with no specific cause for increase.       Objective: See treatment diary below      Assessment: Pt tolerated treatment well. Due to exacerbation of symptoms and increased pain during today's session, today was focused on decreasing overall symptom intensity and musculare tension in all cervical and thoracic musculature. Pt tolerated manual interventions and MHP well, with slight decrease of symptom intensity post session. Pt had extreme increased tension in SMC, and noted increased tingling sensation in face with deep STM. Cervical SNAGs were added to attempt to decrease overall tension in SCM and other associated cervical musculature. Patient exhibited good technique with therapeutic exercises and would benefit from continued PT      Plan: Continue per plan of care.  Progress treatment as tolerated.       Precautions:       Date 2/14 2/21 2/26 2/28 3/12 3/14 3/19 3/21 3/25    Visit # 51 52 53 [phone] 59    FOTO     NV NV XXX      Re-eval                   Focus on NM control of Left scapula and posterior cervical musculature.      Manuals 2/14 2/21 2/26 2/28 3/12 3/14 3/19 3/21 3/25    T/S Mobe   PWK j-scoop ctj grade 5 PWK j-scoop CTJ PWK j-scoop CTJ PWK j-scoop   PWK j-scoop    UT/LS Str  PWK PWK          Scapular Mob  PWK PWK  "PWK PWK gentle        Cervical Mob   PWK gentle PWK gentle PWK gentle PWK gentle   PWK     R Shldr PROM and STM             Cupping              CS gentle traction   PWK PWK PWK PWK   PWK    IASTM   PWK (gun) PWK (gun)          Median Nerve Flossing             T spine STM  PWK PWK  PWK PWK   PWK    Cervical Traction     NV NV       Vestibular Testing             Neuro Re-Ed 2/14 2/21 2/26 2/28 3/12 3/14 3/19 3/21 3/25    Pt Education             VOR 1 horizontal 3x30\" standing 3x30\" standing           VOR 1 Vertical 3x30\" standing 3x30\" standing           VOR 2 Horizontal             VOR 2 Vertical             Gaze stabilization             Y lift              Chin tucks              Prone serratus              Biodex             SLS 3x20\" on foam b/l  3x20\" on foam b/l           Balance Beam Ambulation             Tandem Stance  3x20\" EC on foam 3x20\" EC on foam           Wall clocks             Wall walks              Deep Neck flex supine             Foam roll protocol    8'  8' 8'    8'    Pain/postural ucation, dx edu, vestibular edu             Ther Ex 2/14 2/21 2/26 2/28 3/12 3/14 3/19 3/21 3/25    UBE 3'/3' 3'/3' 3'/3' 3'/3' 3'/3' 3'/3' 3'/3' 3'/3' 3'/3'    Open Books   15x3\" 10x3\"  L 10x3\" L    10x3\" ea    Cervical SNAGs         10x3\" ea    Crossbody Str  10x5\" ea 10x5\" ea 10x5\"  10x3\" L        Scap stretch   10x5\" self hug 10x5\" self hug 10x5\" 105\"         Median Nerve Glide  20x           No moneys/ horizontal abd       30x GTB ea      T/S ext, rot             Tband rows, ext 20x ea 15#     20x ea 12# 20x ea 15# 20x ea 15#     B/l ER 20x 5#     20x 5#  20x 6# 20x 6#     B/l IR 20x 7#     20x 7# 20x 8# 20x 8#     D2 Flexion      20x black TB 30x black TB 30x blackTB     Pball roll out  10x10\"  10x3\" 3-way          Quad Ts, Ys, Ws as able             Prone I, T, Y       20x ea SB 3# 20x ea SB 3#     Shoulder SHurgs      15x 3# 15x3\" 3# 20x3\" 4#     Scap Squeezes      15x 3# 15x3\" 3# 20x3\" 4#     Coleman " "Raises        20x 4#     Scap Punches        20x 4#     Scap border stretch  3x20\"             Ther Activity 2/14            Patient education               Elliptical              Gait Training                                                  Modalities   2/26 2/28 3/12  3/19  3/21   3/25    MHP   10   prone 10' prone 10' prone     10' prone  10' prone                                                            "

## 2025-03-25 NOTE — TELEPHONE ENCOUNTER
Patient calling to inform office that his prescription for ibuprofen (MOTRIN) 600 mg tablet is supposed to be 800 mg. Patient states he does not need a refill at this time, but he noticed that the dosage was incorrect.

## 2025-03-26 ENCOUNTER — TELEPHONE (OUTPATIENT)
Age: 40
End: 2025-03-26

## 2025-03-26 NOTE — TELEPHONE ENCOUNTER
I spoke with Maryuri from [facility] [phone] ext [phone] about a med cert filled ou by Dr. Schwab. Warm transfer to clinical unavailable.   She needed to get clarification on some discrepancies with the forms.   He had signed his portion with the date 02/18/25 but on page 2 its signed 02/17/2024. There was a  comment made in previous conversations that info may be pulling from previous forms. Did the date get transferred from a previous form?  Also the employer advised could not work with restrictions of no lifting greater than 25 lbs frequest breaks. She is trying to make sure all info from employer/pcp/neuro matches. [facility] recently sent over a new integrated med cert which is two pages long. one page is for short term disability and one page is for leave and they were faxed to [employer]. Please look out for these. And please give Maryuri a call back to make sure forms are edited appropriately.

## 2025-03-26 NOTE — TELEPHONE ENCOUNTER
We have received 2 sets of forms to be completed. This encounter has been printed for Mariam as she was the one to complete it previously.

## 2025-03-27 ENCOUNTER — OFFICE VISIT (OUTPATIENT)
Dept: PHYSICAL THERAPY | Facility: CLINIC | Age: 40
End: 2025-03-27
Payer: COMMERCIAL

## 2025-03-27 ENCOUNTER — DOCUMENTATION (OUTPATIENT)
Age: 40
End: 2025-03-27

## 2025-03-27 DIAGNOSIS — M54.6 CHRONIC LEFT-SIDED THORACIC BACK PAIN: ICD-10-CM

## 2025-03-27 DIAGNOSIS — G89.29 NECK PAIN, CHRONIC: Primary | ICD-10-CM

## 2025-03-27 DIAGNOSIS — G89.29 CHRONIC LEFT-SIDED THORACIC BACK PAIN: ICD-10-CM

## 2025-03-27 DIAGNOSIS — M54.2 NECK PAIN, CHRONIC: Primary | ICD-10-CM

## 2025-03-27 PROCEDURE — 97110 THERAPEUTIC EXERCISES: CPT

## 2025-03-27 PROCEDURE — 97140 MANUAL THERAPY 1/> REGIONS: CPT

## 2025-03-27 NOTE — PROGRESS NOTES
Daily Note     Today's date: 3/27/2025  Patient name: Elvira Grant  : 1985  MRN: [id number]  Referring provider: Enrique Colmenares MD  Dx:   Encounter Diagnosis     ICD-10-CM    1. Neck pain, chronic  M54.2     G89.29       2. Chronic left-sided thoracic back pain  M54.6     G89.29           Start Time: 1103  Stop Time: 1141  Total time in clinic (min): 38 minutes    Subjective: Pt reports that he is feeling so-so coming into today's session. Noting that he is still sore, but much less than previous session.       Objective: See treatment diary below      Assessment: Pt tolerated treatment well. Today's session was mixed with a combination of symptom reduction techniques with manual interventions and several strengthening exercises. Several cavitations were heard with grade V thoracic mobilization, which pt has symptom reduction post intervention. Pt was responded well to STM, with increased tension felt throughout left sided thoracic paraspinal musculature.  Pt showed good tolerance with strengthening exercises, completing all sets and reps with proper form and no exacerbation of symptoms noted during or post session. Patient exhibited good technique with therapeutic exercises and would benefit from continued PT      Plan: Continue per plan of care.  Progress treatment as tolerated.       Precautions:       Date 2/14 2/21 2/26 2/28 3/12 3/14 3/19 3/21 3/25 3/27   Visit # 51 52 53 54 [phone]   FOTO     NV NV XXX      Re-eval                   Focus on NM control of Left scapula and posterior cervical musculature.      Manuals 2/14 2/21 2/26 2/28 3/12 3/14 3/19 3/21 3/25 3/27   T/S Mobe   PWK j-scoop ctj grade 5 PWK j-scoop CTJ PWK j-scoop CTJ PWK j-scoop   PWK j-scoop PWK j-scoop   UT/LS Str  PWK PWK   PWK       Scapular Mob  PWK PWK PWK PWK gentle        Cervical Mob   PWK gentle PWK gentle PWK gentle PWK gentle   PWK  PWK   R Shldr PROM and STM             Cupping              CS gentle  "traction   PWK PWK PWK PWK   PWK PWK   IASTM   PWK (gun) PWK (gun)          Median Nerve Flossing             T spine STM  PWK PWK  PWK PWK   PWK PWK   Cervical Traction     NV NV       Vestibular Testing             Neuro Re-Ed 2/14 2/21 2/26 2/28 3/12 3/14 3/19 3/21 3/25 3/27   Pt Education             VOR 1 horizontal 3x30\" standing 3x30\" standing           VOR 1 Vertical 3x30\" standing 3x30\" standing           VOR 2 Horizontal             VOR 2 Vertical             Gaze stabilization             Y lift              Chin tucks              Prone serratus              Biodex             SLS 3x20\" on foam b/l  3x20\" on foam b/l           Balance Beam Ambulation             Tandem Stance  3x20\" EC on foam 3x20\" EC on foam           Wall clocks             Wall walks              Deep Neck flex supine             Foam roll protocol    8'  8' 8'    8'    Pain/postural ucation, dx edu, vestibular edu             Ther Ex 2/14 2/21 2/26 2/28 3/12 3/14 3/19 3/21 3/25 3/27   UBE 3'/3' 3'/3' 3'/3' 3'/3' 3'/3' 3'/3' 3'/3' 3'/3' 3'/3' 3'/3'   Open Books   15x3\" 10x3\"  L 10x3\" L    10x3\" ea     Cervical SNAGs         10x3\" ea 10x3\"    Crossbody Str  10x5\" ea 10x5\" ea 10x5\"  10x3\" L        Scap stretch   10x5\" self hug 10x5\" self hug 10x5\" 105\"         Median Nerve Glide  20x           No moneys/ horizontal abd       30x GTB ea      T/S ext, rot             Tband rows, ext 20x ea 15#     20x ea 12# 20x ea 15# 20x ea 15#  20x ea 15#   B/l ER 20x 5#     20x 5#  20x 6# 20x 6#  20x 6# L   B/l IR 20x 7#     20x 7# 20x 8# 20x 8#  20x 8# L   D2 Flexion      20x black TB 30x black TB 30x blackTB     Pball roll out  10x10\"  10x3\" 3-way          Quad Ts, Ys, Ws as able             Prone I, T, Y       20x ea SB 3# 20x ea SB 3#  20x ea SB 3#   Shoulder SHurgs      15x 3# 15x3\" 3# 20x3\" 4#     Scap Squeezes      15x 3# 15x3\" 3# 20x3\" 4#     Coleman Raises        20x 4#     Scap Punches        20x 4#     Scap border stretch  3x20\"           "   Ther Activity 2/14            Patient education               Elliptical              Gait Training                                                  Modalities   2/26 2/28 3/12  3/19  3/21   3/25    MHP   10   prone 10' prone 10' prone     10' prone  10' prone

## 2025-03-27 NOTE — TELEPHONE ENCOUNTER
Patient called in read note as follows discussed with pt that FMLA paperwork has to be reviewed by PCP and sign and dated pt understood and had no further questions

## 2025-04-01 ENCOUNTER — OFFICE VISIT (OUTPATIENT)
Dept: PHYSICAL THERAPY | Facility: CLINIC | Age: 40
End: 2025-04-01
Payer: COMMERCIAL

## 2025-04-01 DIAGNOSIS — M54.6 CHRONIC LEFT-SIDED THORACIC BACK PAIN: ICD-10-CM

## 2025-04-01 DIAGNOSIS — G89.29 NECK PAIN, CHRONIC: Primary | ICD-10-CM

## 2025-04-01 DIAGNOSIS — M54.2 NECK PAIN, CHRONIC: Primary | ICD-10-CM

## 2025-04-01 DIAGNOSIS — G89.29 CHRONIC LEFT-SIDED THORACIC BACK PAIN: ICD-10-CM

## 2025-04-01 PROCEDURE — 97140 MANUAL THERAPY 1/> REGIONS: CPT

## 2025-04-01 PROCEDURE — 97110 THERAPEUTIC EXERCISES: CPT

## 2025-04-01 PROCEDURE — 97112 NEUROMUSCULAR REEDUCATION: CPT

## 2025-04-01 NOTE — PROGRESS NOTES
Daily Note     Today's date: 2025  Patient name: Elvira Grant  : 1985  MRN: [id number]  Referring provider: Enrique Colmenares MD  Dx:   Encounter Diagnosis     ICD-10-CM    1. Neck pain, chronic  M54.2     G89.29       2. Chronic left-sided thoracic back pain  M54.6     G89.29           Start Time: 0830  Stop Time: 0910  Total time in clinic (min): 40 minutes    Subjective: Pt reports that he is having 5/10 pain in his left scapula region coming into today's session. He noted that his discomfort is no where near as high as it is when he is working, but it is very uncomfortable.       Objective: See treatment diary below      Assessment: Pt tolerated treatment well. Along with manual interventions that have been performed in previous few sessions which pt has had successful achievement of desired results with, re-introduce scapular mobility to improve overall mobility, reduce tension in associated scapular musculature, and reduce symptom intensity globally Pt responded very well to all manual interventions again today, with slight reduction overall post manual interventions. Patient exhibited good technique with therapeutic exercises and would benefit from continued PT      Plan: Continue per plan of care.  Progress treatment as tolerated.       Precautions:       Date 4/1   2/28 3/12 3/14 3/19 3/21 3/25 3/27   Visit # 61   54 55 56 57 58 59 60   FOTO     NV NV XXX      Re-eval                   Focus on NM control of Left scapula and posterior cervical musculature.      Manuals 4/1   2/28 3/12 3/14 3/19 3/21 3/25 3/27   T/S Mobe PWK j-scoop    PWK j-scoop CTJ PWK j-scoop CTJ PWK j-scoop   PWK j-scoop PWK j-scoop   UT/LS Str      PWK       Scapular Mob PWK   PWK PWK gentle        Cervical Mob PWK   PWK gentle PWK gentle PWK gentle   PWK  PWK   R Shldr PROM and STM             Cupping              CS gentle traction PWK   PWK PWK PWK   PWK PWK   IASTM    PWK (gun)          Median Nerve Flossing       "       T spine STM PWK    PWK PWK   PWK PWK   Cervical Traction     NV NV       Vestibular Testing             Neuro Re-Ed 4/1   2/28 3/12 3/14 3/19 3/21 3/25 3/27   Pt Education             VOR 1 horizontal NV            VOR 1 Vertical NV            VOR 2 Horizontal NV            VOR 2 Vertical NV            Gaze stabilization NV            Y lift              Chin tucks              Prone serratus              Biodex NV            SLS NV            Balance Beam Ambulation NV            Tandem Stance              Wall clocks             Wall walks              Deep Neck flex supine             Foam roll protocol  8'   8' 8'    8'    Pain/postural ucation, dx edu, vestibular edu             Ther Ex 4/1   2/28 3/12 3/14 3/19 3/21 3/25 3/27   UBE 3'/3'   3'/3' 3'/3' 3'/3' 3'/3' 3'/3' 3'/3' 3'/3'   Open Books    10x3\"  L 10x3\" L    10x3\" ea     Cervical SNAGs         10x3\" ea 10x3\"    Crossbody Str    10x5\"  10x3\" L        Scap stretch     10x5\" 105\"         Median Nerve [place]             No moneys/ horizontal abd       30x GTB ea      T/S ext, rot             Tband rows, ext      20x ea 12# 20x ea 15# 20x ea 15#  20x ea 15#   B/l ER      20x 5#  20x 6# 20x 6#  20x 6# L   B/l IR      20x 7# 20x 8# 20x 8#  20x 8# L   D2 Flexion      20x black TB 30x black TB 30x blackTB     Pball roll out               Quad Ts, Ys, Ws as able             Prone I, T, Y 20x ea SB 3#      20x ea SB 3# 20x ea SB 3#  20x ea SB 3#   Shoulder SHurgs      15x 3# 15x3\" 3# 20x3\" 4#     Scap Squeezes      15x 3# 15x3\" 3# 20x3\" 4#     Coleman Raises 2x20 5#       20x 4#     Scap Punches 2x20 5#       20x 4#     Scap border stretch              Ther Activity             Patient education               Elliptical              Gait Training                                                  Modalities    2/28 3/12  3/19  3/21   3/25    MHP    10' prone 10' prone     10' prone  10' prone                                                                "

## 2025-04-04 ENCOUNTER — OFFICE VISIT (OUTPATIENT)
Dept: PHYSICAL THERAPY | Facility: CLINIC | Age: 40
End: 2025-04-04
Payer: COMMERCIAL

## 2025-04-04 DIAGNOSIS — S06.0X0D CONCUSSION WITHOUT LOSS OF CONSCIOUSNESS, SUBSEQUENT ENCOUNTER: ICD-10-CM

## 2025-04-04 DIAGNOSIS — H83.2X3 VESTIBULAR HYPOFUNCTION OF BOTH EARS: ICD-10-CM

## 2025-04-04 DIAGNOSIS — M54.2 NECK PAIN, CHRONIC: Primary | ICD-10-CM

## 2025-04-04 DIAGNOSIS — G89.29 NECK PAIN, CHRONIC: Primary | ICD-10-CM

## 2025-04-04 DIAGNOSIS — G89.29 CHRONIC LEFT-SIDED THORACIC BACK PAIN: ICD-10-CM

## 2025-04-04 DIAGNOSIS — M54.6 CHRONIC LEFT-SIDED THORACIC BACK PAIN: ICD-10-CM

## 2025-04-04 PROCEDURE — 97112 NEUROMUSCULAR REEDUCATION: CPT

## 2025-04-04 PROCEDURE — 97110 THERAPEUTIC EXERCISES: CPT

## 2025-04-04 NOTE — PROGRESS NOTES
Daily Note     Today's date: 2025  Patient name: Elvira Grant  : 1985  MRN: [id number]  Referring provider: Enrique Colmenares MD  Dx:   Encounter Diagnosis     ICD-10-CM    1. Neck pain, chronic  M54.2     G89.29       2. Chronic left-sided thoracic back pain  M54.6     G89.29       3. Vestibular hypofunction of both ears  H83.2X3       4. Concussion without loss of consciousness, subsequent encounter  S06.0X0D           Start Time: 905  Stop Time: 950  Total time in clinic (min): 45 minutes    Subjective: Pt reports that he is very forgetful and foggy coming into today's session. He also noted no change in symptom intensity in his t-spine/c-spine.       Objective: See treatment diary below      Assessment: Pt tolerated treatment well. Focused majority of today's PT session on vetibular training and balancedue to pt reporting increased symptoms today and wanting to improve overall balance and stability with high level activities. During the vertical movements of both VOR 1 and 2 pt noting increased strain in cervical musculature and slight increased headache after vestibular exercises. Pt was educated to decrease the range of the movements exercise, which he responded well to no increased cervical symptoms at lesser ROM. Pt did note that he was having the spots/floaters at end range movements, more specially with VOR 2 in both directions. Pt was also challenged with SLS, on foam and EC with increased sway and use of UE 2-3 times a rep to maintain balance. Patient exhibited good technique with therapeutic exercises and would benefit from continued PT      Plan: Continue per plan of care.  Progress treatment as tolerated.       Precautions:       Date 4/1 4/4  2/28 3/12 3/14 3/19 3/21 3/25 3/27   Visit # 61 62  54 55 56 57 58 59 60   FOTO     NV NV XXX      Re-eval                   Focus on NM control of Left scapula and posterior cervical musculature.      Manuals 4/1 4/4  2/28 3/12 3/14 3/19  "3/21 3/25 3/27   T/S Mobe PWK j-scoop    PWK j-scoop CTJ PWK j-scoop CTJ PWK j-scoop   PWK j-scoop PWK j-scoop   UT/LS Str      PWK       Scapular Mob PWK   PWK PWK gentle        Cervical Mob PWK   PWK gentle PWK gentle PWK gentle   PWK  PWK   R Shldr PROM and STM             Cupping              CS gentle traction PWK   PWK PWK PWK   PWK PWK   IASTM    PWK (gun)          Median Nerve Flossing             T spine STM PWK    PWK PWK   PWK PWK   Cervical Traction     NV NV       Vestibular Testing             Neuro Re-Ed 4/1 4/4  2/28 3/12 3/14 3/19 3/21 3/25 3/27   Pt Education             VOR 1 horizontal NV 3x30\"           VOR 1 Vertical NV 3x30\"           VOR 2 Horizontal NV 3x30\"           VOR 2 Vertical NV 3x30\"           Gaze stabilization NV 10x3\"            Y lift              Chin tucks              Prone serratus              Biodex NV 2x maze lvl 3 2' target           SLS NV 3x30\" foa            Balance Beam Ambulation NV            Tandem Stance              Wall clocks             Wall walks              Deep Neck flex supine             Foam roll protocol  8'   8' 8'    8'    Pain/postural ucation, dx edu, vestibular edu             Ther Ex 4/1 4/4  2/28 3/12 3/14 3/19 3/21 3/25 3/27   UBE 3'/3' 3'/3'  3'/3' 3'/3' 3'/3' 3'/3' 3'/3' 3'/3' 3'/3'   Open Books    10x3\"  L 10x3\" L    10x3\" ea     Cervical SNAGs         10x3\" ea 10x3\"    Crossbody Str  10x3\" L  10x5\"  10x3\" L        Scap stretch     10x5\" 105\"         Median Nerve Glide  20x            No moneys/ horizontal abd       30x GTB ea      T/S ext, rot             Tband rows, ext      20x ea 12# 20x ea 15# 20x ea 15#  20x ea 15#   B/l ER      20x 5#  20x 6# 20x 6#  20x 6# L   B/l IR      20x 7# 20x 8# 20x 8#  20x 8# L   D2 Flexion      20x black TB 30x black TB 30x blackTB     Pball roll out               Quad Ts, Ys, Ws as able             Prone I, T, Y 20x ea SB 3#      20x ea SB 3# 20x ea SB 3#  20x ea SB 3#   Shoulder SHurgs      15x 3# 15x3\" 3# " "20x3\" 4#     Scap Squeezes      15x 3# 15x3\" 3# 20x3\" 4#     Coleman Raises 2x20 5#       20x 4#     Scap Punches 2x20 5#       20x 4#     Scap border stretch              Ther Activity             Patient education               Elliptical              Gait Training                                                  Modalities    2/28 3/12  3/19  3/21   3/25    [facility]    10' prone 10' prone     10' prone  10' prone                                                                  "

## 2025-04-11 ENCOUNTER — OFFICE VISIT (OUTPATIENT)
Dept: PHYSICAL THERAPY | Facility: CLINIC | Age: 40
End: 2025-04-11
Payer: COMMERCIAL

## 2025-04-11 DIAGNOSIS — S06.0X0D CONCUSSION WITHOUT LOSS OF CONSCIOUSNESS, SUBSEQUENT ENCOUNTER: ICD-10-CM

## 2025-04-11 DIAGNOSIS — M54.2 NECK PAIN, CHRONIC: Primary | ICD-10-CM

## 2025-04-11 DIAGNOSIS — G89.29 NECK PAIN, CHRONIC: Primary | ICD-10-CM

## 2025-04-11 DIAGNOSIS — H83.2X3 VESTIBULAR HYPOFUNCTION OF BOTH EARS: ICD-10-CM

## 2025-04-11 DIAGNOSIS — M54.6 CHRONIC LEFT-SIDED THORACIC BACK PAIN: ICD-10-CM

## 2025-04-11 DIAGNOSIS — G89.29 CHRONIC LEFT-SIDED THORACIC BACK PAIN: ICD-10-CM

## 2025-04-11 PROCEDURE — 97110 THERAPEUTIC EXERCISES: CPT

## 2025-04-11 PROCEDURE — 97112 NEUROMUSCULAR REEDUCATION: CPT

## 2025-04-11 PROCEDURE — 97140 MANUAL THERAPY 1/> REGIONS: CPT

## 2025-04-11 NOTE — PROGRESS NOTES
Daily Note     Today's date: 2025  Patient name: Elvira Grant  : 1985  MRN: [id number]  Referring provider: Enrique Colmenares MD  Dx:   Encounter Diagnosis     ICD-10-CM    1. Neck pain, chronic  M54.2     G89.29       2. Chronic left-sided thoracic back pain  M54.6     G89.29       3. Vestibular hypofunction of both ears  H83.2X3       4. Concussion without loss of consciousness, subsequent encounter  S06.0X0D           Start Time: 0900  Stop Time: 09  Total time in clinic (min): 40 minutes    Subjective: Pt reports that his neck and shoulder are sore coming into today's session noting that over the last three days it was been more intense.       Objective: See treatment diary below      Assessment: Pt tolerated treatment well. Progressed biodex from level 3 last session to level 1 during today's session due to pt showing improved overall stability on level 3, as well as pt performing good percentages with all test. Pt showed improvement in maze control getting 86% on the first rep and 93% on the second rep. Pt was able to achieve 94% throughout the full 2', at level 1 floor stability, small target, and fast target movements speed. Pt responded well to manual interventions, with successful reduction of symptom intensity and tension post manual interventions. Pt remains challenged with VOR 1 and 2 in both directions, noted fatigue in his felt eye and pressure in his right eye with movements. Pt reported that with vertical movements, he has been getting double visions, which resolved with completion of exercise. Patient exhibited good technique with therapeutic exercises and would benefit from continued PT      Plan: Continue per plan of care.  Progress treatment as tolerated.       Precautions:       Date 4/1 4/4 4/11 2/28 3/12 3/14 3/19 3/21 3/25 3/27   Visit # 61 62 63 54 55 56 57 58 59 60   FOTO     NV NV XXX      Re-eval                   Focus on NM control of Left scapula and posterior  "cervical musculature.      Manuals 4/1 4/4 4/11   3/14 3/19 3/21 3/25 3/27   T/S Mobe PWK j-scoop   PWK j-scoop   PWK j-scoop   PWK j-scoop PWK j-scoop   UT/LS Str      PWK       Scapular Mob PWK            Cervical Mob PWK  PWK   PWK gentle   PWK  PWK   R Shldr PROM and STM             Cupping              CS gentle traction PWK  PWK   PWK   PWK PWK   IASTM             Median Nerve Flossing             T spine STM PWK  PWK   PWK   PWK PWK   Cervical Traction      NV       Vestibular Testing             Neuro Re-Ed 4/1 4/4 4/11   3/14 3/19 3/21 3/25 3/27   Pt Education             VOR 1 horizontal NV 3x30\" 3x30\"           VOR 1 Vertical NV 3x30\" 3x30\"           VOR 2 Horizontal NV 3x30\" 3x30\"           VOR 2 Vertical NV 3x30\" 3x30\"           Gaze stabilization NV 10x3\"            Y lift              Chin tucks              Prone serratus              Biodex NV 2x maze lvl 3 2' target 2x maze lvl 1, 2' targert lvl 1          SLS NV 3x30\" foa            Balance Beam Ambulation NV            Tandem Stance              Wall clocks             Wall walks              Deep Neck flex supine             Foam roll protocol  8'        8'    Pain/postural ucation, dx edu, vestibular edu             Ther Ex 4/1 4/4 4/11   3/14 3/19 3/21 3/25 3/27   UBE 3'/3' 3'/3' 3'/3'   3'/3' 3'/3' 3'/3' 3'/3' 3'/3'   Open Books         10x3\" ea     Cervical SNAGs         10x3\" ea 10x3\"    Crossbody Str  10x3\" L           Scap stretch              Median Nerve Glide  20x            No moneys/ horizontal abd       30x GTB ea      T/S ext, rot             Tband rows, ext   20x e 17#   20x ea 12# 20x ea 15# 20x ea 15#  20x ea 15#   B/l ER      20x 5#  20x 6# 20x 6#  20x 6# L   B/l IR      20x 7# 20x 8# 20x 8#  20x 8# L   D2 Flexion      20x black TB 30x black TB 30x blackTB     Pball roll out               Quad Ts, Ys, Ws as able             Prone I, T, Y 20x ea SB 3#  20x ea SB 3#    20x ea SB 3# 20x ea SB 3#  20x ea SB 3#   Shoulder SHurgs     " " 15x 3# 15x3\" 3# 20x3\" 4#     Scap Squeezes      15x 3# 15x3\" 3# 20x3\" 4#     Coleman Raises 2x20 5#       20x 4#     Scap Punches 2x20 5#       20x 4#     Scap border stretch              Ther Activity             Patient education               Elliptical              Gait Training                                                  Modalities    2/28 3/12  3/19  3/21   3/25    [facility]    10' prone 10' prone     10' prone  10' prone                                                                    "

## 2025-04-16 ENCOUNTER — OFFICE VISIT (OUTPATIENT)
Dept: PHYSICAL THERAPY | Facility: CLINIC | Age: 40
End: 2025-04-16
Payer: COMMERCIAL

## 2025-04-16 DIAGNOSIS — G89.29 CHRONIC LEFT-SIDED THORACIC BACK PAIN: ICD-10-CM

## 2025-04-16 DIAGNOSIS — S06.0X0D CONCUSSION WITHOUT LOSS OF CONSCIOUSNESS, SUBSEQUENT ENCOUNTER: ICD-10-CM

## 2025-04-16 DIAGNOSIS — M54.6 CHRONIC LEFT-SIDED THORACIC BACK PAIN: ICD-10-CM

## 2025-04-16 DIAGNOSIS — H83.2X3 VESTIBULAR HYPOFUNCTION OF BOTH EARS: ICD-10-CM

## 2025-04-16 DIAGNOSIS — G89.29 NECK PAIN, CHRONIC: Primary | ICD-10-CM

## 2025-04-16 DIAGNOSIS — M54.2 NECK PAIN, CHRONIC: Primary | ICD-10-CM

## 2025-04-16 PROCEDURE — 97110 THERAPEUTIC EXERCISES: CPT

## 2025-04-16 PROCEDURE — 97112 NEUROMUSCULAR REEDUCATION: CPT

## 2025-04-16 PROCEDURE — 97140 MANUAL THERAPY 1/> REGIONS: CPT

## 2025-04-16 NOTE — PROGRESS NOTES
"Daily Note     Today's date: 2025  Patient name: Elvira Grant  : 1985  MRN: [id number]  Referring provider: Enrique Colmenares MD  Dx:   Encounter Diagnosis     ICD-10-CM    1. Neck pain, chronic  M54.2     G89.29       2. Chronic left-sided thoracic back pain  M54.6     G89.29       3. Vestibular hypofunction of both ears  H83.2X3       4. Concussion without loss of consciousness, subsequent encounter  S06.0X0D                      Subjective: Pt reports that his neck is feeling pretty good today however the L shoulder blade region still bothers him daily. Notes LUE weakness.       Objective: See treatment diary below      Assessment: Pt tolerated treatment well.  He was able to perform all exercises this session with good tolerance overall. He did note increased symptoms with VOR vertically that resolved with a short rest break. Patient exhibited good technique with therapeutic exercises and would benefit from continued PT      Plan: Continue per plan of care.  Progress treatment as tolerated.       Precautions:       Date 4/1 4/4 4/11 4/16  3/14 3/19 3/21 3/25 3/27   Visit # 61 62 63 64  56 57 58 59 60   FOTO      NV XXX      Re-eval                   Focus on NM control of Left scapula and posterior cervical musculature.      Manuals 4/1 4/4 4/11 4/16  3/14 3/19 3/21 3/25 3/27   T/S Mobe PWK j-scoop   PWK j-scoop   PWK j-scoop   PWK j-scoop PWK j-scoop   UT/LS Str      PWK       Scapular Mob PWK            Cervical Mob PWK  PWK   PWK gentle   PWK  PWK   R Shldr PROM and STM             Cupping              CS gentle traction PWK  PWK MM  PWK   PWK PWK   IASTM             Median Nerve Flossing             T spine STM PWK  PWK MM  PWK   PWK PWK   Cervical Traction      NV       Vestibular Testing             Neuro Re-Ed 4/1 4/4 4/11 4/16  3/14 3/19 3/21 3/25 3/27   Pt Education             VOR 1 horizontal NV 3x30\" 3x30\"  3x30\"          VOR 1 Vertical NV 3x30\" 3x30\"  3x30\"          VOR 2 " "Horizontal NV 3x30\" 3x30\"  3x30\"          VOR 2 Vertical NV 3x30\" 3x30\"  3x30\"          Gaze stabilization NV 10x3\"            Y lift              Chin tucks              Prone serratus              Biodex NV 2x maze lvl 3 2' target 2x maze lvl 1, 2' targert lvl 1 2x maze lvl 1, 2' targert lvl 1         SLS NV 3x30\" foa            Balance Beam Ambulation NV            Tandem Stance              Wall clocks             Wall walks              Deep Neck flex supine             Foam roll protocol  8'        8'    Pain/postural ucation, dx edu, vestibular edu             Ther Ex 4/1 4/4 4/11 4/16  3/14 3/19 3/21 3/25 3/27   UBE 3'/3' 3'/3' 3'/3' 3'/3'  3'/3' 3'/3' 3'/3' 3'/3' 3'/3'   Open Books         10x3\" ea     Cervical SNAGs         10x3\" ea 10x3\"    Crossbody Str  10x3\" L           Scap stretch              Median Nerve Glide  20x            No moneys/ horizontal abd       30x GTB ea      T/S ext, rot             Tband rows, ext   20x e 17# 20x ea 17#  20x ea 12# 20x ea 15# 20x ea 15#  20x ea 15#   B/l ER      20x 5#  20x 6# 20x 6#  20x 6# L   B/l IR      20x 7# 20x 8# 20x 8#  20x 8# L   D2 Flexion      20x black TB 30x black TB 30x blackTB     Pball roll out               Quad Ts, Ys, Ws as able             Prone I, T, Y 20x ea SB 3#  20x ea SB 3# 20x ea SB 3#   20x ea SB 3# 20x ea SB 3#  20x ea SB 3#   Shoulder SHurgs      15x 3# 15x3\" 3# 20x3\" 4#     Scap Squeezes      15x 3# 15x3\" 3# 20x3\" 4#     Coleman Raises 2x20 5#       20x 4#     Scap Punches 2x20 5#       20x 4#     Scap border stretch              Ther Activity             Patient education               Elliptical              Gait Training                                                  Modalities     3/12  3/19  3/21   3/25    MHP     10' prone     10' prone  10' prone                                                                    " 2

## 2025-04-18 ENCOUNTER — APPOINTMENT (OUTPATIENT)
Dept: PHYSICAL THERAPY | Facility: CLINIC | Age: 40
End: 2025-04-18
Payer: COMMERCIAL

## 2025-04-23 ENCOUNTER — OFFICE VISIT (OUTPATIENT)
Dept: PHYSICAL THERAPY | Facility: CLINIC | Age: 40
End: 2025-04-23
Payer: COMMERCIAL

## 2025-04-23 DIAGNOSIS — G89.29 CHRONIC LEFT-SIDED THORACIC BACK PAIN: ICD-10-CM

## 2025-04-23 DIAGNOSIS — H83.2X3 VESTIBULAR HYPOFUNCTION OF BOTH EARS: ICD-10-CM

## 2025-04-23 DIAGNOSIS — G89.29 NECK PAIN, CHRONIC: Primary | ICD-10-CM

## 2025-04-23 DIAGNOSIS — M54.6 CHRONIC LEFT-SIDED THORACIC BACK PAIN: ICD-10-CM

## 2025-04-23 DIAGNOSIS — M54.2 NECK PAIN, CHRONIC: Primary | ICD-10-CM

## 2025-04-23 DIAGNOSIS — S06.0X0D CONCUSSION WITHOUT LOSS OF CONSCIOUSNESS, SUBSEQUENT ENCOUNTER: ICD-10-CM

## 2025-04-23 PROCEDURE — 97110 THERAPEUTIC EXERCISES: CPT

## 2025-04-23 PROCEDURE — 97140 MANUAL THERAPY 1/> REGIONS: CPT

## 2025-04-23 PROCEDURE — 97112 NEUROMUSCULAR REEDUCATION: CPT

## 2025-04-23 NOTE — PROGRESS NOTES
Daily Note     Today's date: 2025  Patient name: Elvira Grant  : 1985  MRN: [id number]  Referring provider: Enrique Colmenares MD  Dx:   Encounter Diagnosis     ICD-10-CM    1. Neck pain, chronic  M54.2     G89.29       2. Chronic left-sided thoracic back pain  M54.6     G89.29       3. Vestibular hypofunction of both ears  H83.2X3       4. Concussion without loss of consciousness, subsequent encounter  S06.0X0D           Start Time: 1600  Stop Time: 1640  Total time in clinic (min): 40 minutes    Subjective: Pt reports that he is having strong cervical pain, pain in his left shoulder, and increased nimbleness and tingling going down his left arm all the way to his fingers. Along with increased pain in cervical spine and shoulder, pt reports that his balance is still impaired, which he is noticing balance dysfunction with his eyes open now, not only with eyes closed which it was previously.       Objective: See treatment diary below      Assessment: Pt tolerated treatment well. Performed several manual interventions today to attempt to decrease overall symptom intensity in cervical spine and shoulder globally. Pts cervical spine, left sided thoracic spine, and associated shoulder musculature were very hypertonic and flared up. Pt had minimal success with manual interventions with mild reduction in overall symptom intensify post manual interventions. Attempted to perform prone ITY following manual interventions, which pt was very challenged with due to increased overall symptoms intensity, needing rest breaks after 8-9 reps with each exercise. Remainder of PT session was focused on strething and symptom reduction techniques, with had mild success as well, with slightly less symptom intensity post session.  Patient exhibited good technique with therapeutic exercises and would benefit from continued PT      Plan: Continue per plan of care.  Progress treatment as tolerated.       Precautions:  "      Date 4/1 4/4 4/11 4/16 4/23    3/25 3/27   Visit # 61 62 63 64 65    59 60   FOTO             Re-eval                   Focus on NM control of Left scapula and posterior cervical musculature.      Manuals 4/1 4/4 4/11 4/16 4/23   3/21 3/25 3/27   T/S Mobe PWK j-scoop   PWK j-scoop  PQK j-scoop     PWK j-scoop PWK j-scoop   UT/LS Str     PWK        Scapular Mob PWK            Cervical Mob PWK  PWK  PWK    PWK  PWK   R Shldr PROM and STM             Cupping              CS gentle traction PWK  PWK MM PWK    PWK PWK   IASTM             Median Nerve Flossing     PWK L        T spine STM PWK  PWK MM PWK     PWK PWK   Cervical Traction     PWK SOR        Vestibular Testing             Neuro Re-Ed 4/1 4/4 4/11 4/16 4/23   3/21 3/25 3/27   Pt Education             VOR 1 horizontal NV 3x30\" 3x30\"  3x30\"          VOR 1 Vertical NV 3x30\" 3x30\"  3x30\"          VOR 2 Horizontal NV 3x30\" 3x30\"  3x30\"          VOR 2 Vertical NV 3x30\" 3x30\"  3x30\"          Gaze stabilization NV 10x3\"            Y lift              Chin tucks              Prone serratus              Biodex NV 2x maze lvl 3 2' target 2x maze lvl 1, 2' targert lvl 1 2x maze lvl 1, 2' targert lvl 1         SLS NV 3x30\" foa            Balance Beam Ambulation NV            Tandem Stance              Wall clocks             Wall walks              Deep Neck flex supine             Foam roll protocol  8'    8'    8'    Pain/postural ucation, dx edu, vestibular edu             Ther Ex 4/1 4/4 4/11 4/16 4/23   3/21 3/25 3/27   UBE 3'/3' 3'/3' 3'/3' 3'/3' 3'/3'   3'/3' 3'/3' 3'/3'   Open Books         10x3\" ea     Cervical SNAGs         10x3\" ea 10x3\"    Crossbody Str  10x3\" L           Scap stretch              Median Nerve Glide  20x    20x        No moneys/ horizontal abd             T/S ext, rot             Tband rows, ext   20x e 17# 20x ea 17#    20x ea 15#  20x ea 15#   B/l ER        20x 6#  20x 6# L   B/l IR        20x 8#  20x 8# L   D2 Flexion        30x " "blackTB     Pball roll out               Quad Ts, Ys, Ws as able             Prone I, T, Y 20x ea SB 3#  20x ea SB 3# 20x ea SB 3# 20x ea   20x ea SB 3#  20x ea SB 3#   Shoulder SHurgs        20x3\" 4#     Scap Squeezes        20x3\" 4#     Coleman Raises 2x20 5#       20x 4#     Scap Punches 2x20 5#       20x 4#     Scap border stretch              Ther Activity             Patient education               Elliptical              Gait Training                                               Modalities     3/12   3/21   3/25    [facility]     10' prone    10' prone  10' prone                                                                     "

## 2025-04-25 ENCOUNTER — OFFICE VISIT (OUTPATIENT)
Dept: PHYSICAL THERAPY | Facility: CLINIC | Age: 40
End: 2025-04-25
Payer: COMMERCIAL

## 2025-04-25 DIAGNOSIS — G89.29 CHRONIC LEFT-SIDED THORACIC BACK PAIN: ICD-10-CM

## 2025-04-25 DIAGNOSIS — H83.2X3 VESTIBULAR HYPOFUNCTION OF BOTH EARS: ICD-10-CM

## 2025-04-25 DIAGNOSIS — S06.0X0D CONCUSSION WITHOUT LOSS OF CONSCIOUSNESS, SUBSEQUENT ENCOUNTER: ICD-10-CM

## 2025-04-25 DIAGNOSIS — G89.29 NECK PAIN, CHRONIC: Primary | ICD-10-CM

## 2025-04-25 DIAGNOSIS — M54.6 CHRONIC LEFT-SIDED THORACIC BACK PAIN: ICD-10-CM

## 2025-04-25 DIAGNOSIS — M54.2 NECK PAIN, CHRONIC: Primary | ICD-10-CM

## 2025-04-25 PROCEDURE — 97140 MANUAL THERAPY 1/> REGIONS: CPT

## 2025-04-25 PROCEDURE — 97112 NEUROMUSCULAR REEDUCATION: CPT

## 2025-04-25 NOTE — PROGRESS NOTES
"Daily Note     Today's date: 2025  Patient name: Elvira Grant  : 1985  MRN: [id number]  Referring provider: Enrique Colmenares MD  Dx:   Encounter Diagnosis     ICD-10-CM    1. Neck pain, chronic  M54.2     G89.29       2. Chronic left-sided thoracic back pain  M54.6     G89.29       3. Vestibular hypofunction of both ears  H83.2X3       4. Concussion without loss of consciousness, subsequent encounter  S06.0X0D           Start Time: 1030  Stop Time: 1112  Total time in clinic (min): 42 minutes    Subjective: Pt reports that he is having discomfort in his cervical and thoracic spine similar to last PT session. Pt also reports that he is having severe, migraine headaches coming into today's session.       Objective: See treatment diary below      Assessment: Pt tolerated treatment well. Performed several manual interventions again during today's session due to pt noting symptom flare up again coming into today's session, which he responded well to again today, with mild symptom relief post manual interventions. Focuses remainder of PT session of balance and vestibular dysfunction, which pt still remains challenged with. Pt continues to get double vision with all end range movements in VOR 1 and 2 both vertically and horizontally. He also noted that in his right eye he is still getting \"floaters\" with end range movements, but did reported in the left. Pt also noting increased \"swooshing\" in both his eyes and head during last sets of VOR 2. Pt also has increased sway with all balance exercises, but is able to self correct to maintain balance with no falls or assistance from PT to regain balance. Patient exhibited good technique with therapeutic exercises and would benefit from continued PT      Plan: Continue per plan of care.  Progress treatment as tolerated.       Precautions:       Date 4/1 4/4 4/11 4/16 4/23 4/25   3/25 3/27   Visit # 61 62 63 64 65 66   59 60   FOTO             Re-eval           " "        Focus on NM control of Left scapula and posterior cervical musculature.      Manuals 4/1 4/4 4/11 4/16 4/23 4/25  3/21 3/25 3/27   T/S Mobe PWK j-scoop   PWK j-scoop  PQK j-scoop  PWK j-scoop   PWK j-scoop PWK j-scoop   UT/LS Str     PWK PWK       Scapular Mob PWK            Cervical Mob PWK  PWK  PWK PWK   PWK  PWK   R Shldr PROM and STM             Cupping              CS gentle traction PWK  PWK MM PWK PWK   PWK PWK   IASTM             Median Nerve Flossing     PWK L PWK L       T spine STM PWK  PWK MM PWK  PWK    PWK PWK   Cervical Traction     PWK SOR PWK SOR       Vestibular Testing             Neuro Re-Ed 4/1 4/4 4/11 4/16 4/23 4/25  3/21 3/25 3/27   Pt Education      2x30\"        VOR 1 horizontal NV 3x30\" 3x30\"  3x30\"   2x30\"        VOR 1 Vertical NV 3x30\" 3x30\"  3x30\"   2x30\"        VOR 2 Horizontal NV 3x30\" 3x30\"  3x30\"   2x30\"        VOR 2 Vertical NV 3x30\" 3x30\"  3x30\"          Gaze stabilization NV 10x3\"            Y lift              Chin tucks              Prone serratus              Biodex NV 2x maze lvl 3 2' target 2x maze lvl 1, 2' targert lvl 1 2x maze lvl 1, 2' targert lvl 1  2x maze lvl 1, (67% then 65%) 2' targert lvl 1       SLS NV 3x30\" foa     3x30\" foam       Balance Beam Ambulation NV     5 laps       Tandem Stance       2x30\" ea on foam EC       Wall clocks             Wall walks              Deep Neck flex supine             Foam roll protocol  8'    8'    8'    Pain/postural ucation, dx edu, vestibular edu             Ther Ex 4/1 4/4 4/11 4/16 4/23 4/25  3/21 3/25 3/27   UBE 3'/3' 3'/3' 3'/3' 3'/3' 3'/3' 3'/3'  3'/3' 3'/3' 3'/3'   Open Books         10x3\" ea     Cervical SNAGs         10x3\" ea 10x3\"    Crossbody Str  10x3\" L           Scap stretch              Median Nerve Glide  20x    20x        No moneys/ horizontal abd             T/S ext, rot             Tband rows, ext   20x e 17# 20x ea 17#    20x ea 15#  20x ea 15#   B/l ER        20x 6#  20x 6# L   B/l IR        20x 8#  " "20x 8# L   D2 Flexion        30x blackTB     Pball roll out               Quad Ts, Ys, Ws as able             Prone I, T, Y 20x ea SB 3#  20x ea SB 3# 20x ea SB 3# 20x ea   20x ea SB 3#  20x ea SB 3#   Shoulder SHurgs        20x3\" 4#     Scap Squeezes        20x3\" 4#     Coleman Raises 2x20 5#       20x 4#     Scap Punches 2x20 5#       20x 4#     Scap border stretch              Ther Activity             Patient education               Elliptical              Gait Training                                               Modalities     3/12   3/21   3/25    [facility]     10' prone    10' prone  10' prone                                                                       "

## 2025-05-01 ENCOUNTER — OFFICE VISIT (OUTPATIENT)
Dept: PHYSICAL THERAPY | Facility: CLINIC | Age: 40
End: 2025-05-01
Payer: COMMERCIAL

## 2025-05-01 DIAGNOSIS — M54.6 CHRONIC LEFT-SIDED THORACIC BACK PAIN: ICD-10-CM

## 2025-05-01 DIAGNOSIS — M54.2 NECK PAIN, CHRONIC: Primary | ICD-10-CM

## 2025-05-01 DIAGNOSIS — H83.2X3 VESTIBULAR HYPOFUNCTION OF BOTH EARS: ICD-10-CM

## 2025-05-01 DIAGNOSIS — G89.29 NECK PAIN, CHRONIC: Primary | ICD-10-CM

## 2025-05-01 DIAGNOSIS — G89.29 CHRONIC LEFT-SIDED THORACIC BACK PAIN: ICD-10-CM

## 2025-05-01 DIAGNOSIS — S06.0X0D CONCUSSION WITHOUT LOSS OF CONSCIOUSNESS, SUBSEQUENT ENCOUNTER: ICD-10-CM

## 2025-05-01 PROCEDURE — 97112 NEUROMUSCULAR REEDUCATION: CPT

## 2025-05-01 PROCEDURE — 97140 MANUAL THERAPY 1/> REGIONS: CPT

## 2025-05-01 NOTE — PROGRESS NOTES
"Daily Note     Today's date: 2025  Patient name: Elvira Grant  : 1985  MRN: [id number]  Referring provider: Enrique Colmenares MD  Dx:   Encounter Diagnosis     ICD-10-CM    1. Neck pain, chronic  M54.2     G89.29       2. Chronic left-sided thoracic back pain  M54.6     G89.29       3. Vestibular hypofunction of both ears  H83.2X3       4. Concussion without loss of consciousness, subsequent encounter  S06.0X0D           Start Time: 805  Stop Time: 0850  Total time in clinic (min): 45 minutes    Subjective: Pt reports that he is feeling so-so coming into today's session. He noted that after last PT session, he has successful relief and reduction of all symptom intensity for around 2-3 days post PT session.       Objective: See treatment diary below      Assessment: Pt tolerated treatment well. Due to pt reporting successful reduction in overall symptom intensity for around 2-3 days following last PT session, today's session was kept at the same intensity to achieve same desired results as previous PT session. Pt tolerated all manual interventions well, with reduced overall symptom intensity post session. Pt  remains challenged with all balance exercises during today's session, with sway with both SLS and tandem stance still seen, but pt is able to use self correction techniques to maintain balance with no assistance from PT and only occasionally use of UE around every 10-12 seconds. Pt noted pressure in his forehead with both VOR 1 and VOR 2, and \"swooshing\" during the final 10 seconds of his final rep. Patient exhibited good technique with therapeutic exercises and would benefit from continued PT      Plan: Continue per plan of care.  Progress treatment as tolerated.       Precautions:       Date       Visit # 61 62 63 64 65 66 67      FOTO             Re-eval                   Focus on NM control of Left scapula and posterior cervical musculature.      Manuals  " "4/4 4/11 4/16 4/23 4/25 5/1      T/S Mobe PWK j-scoop   PWK j-scoop  PQK j-scoop  PWK j-scoop Pwk j-scoop      UT/LS Str     PWK PWK PWK      Scapular Mob PWK            Cervical Mob PWK  PWK  PWK PWK PWK      R Shldr PROM and STM             Cupping              CS gentle traction PWK  PWK MM PWK PWK PWK      IASTM             Median Nerve Flossing     PWK L PWK L PWK L      T spine STM PWK  PWK MM PWK  PWK  PWK       Cervical Traction     PWK SOR PWK SOR PWK SOR      Vestibular Testing             Neuro Re-Ed 4/1 4/4 4/11 4/16 4/23 4/25 4/25      Pt Education      2x30\"  2x30\"      VOR 1 horizontal NV 3x30\" 3x30\"  3x30\"   2x30\"  2x30\"       VOR 1 Vertical NV 3x30\" 3x30\"  3x30\"   2x30\"  2x30\"      VOR 2 Horizontal NV 3x30\" 3x30\"  3x30\"   2x30\"  2x30\"       VOR 2 Vertical NV 3x30\" 3x30\"  3x30\"          Gaze stabilization NV 10x3\"            Y lift              Chin tucks              Prone serratus              Biodex NV 2x maze lvl 3 2' target 2x maze lvl 1, 2' targert lvl 1 2x maze lvl 1, 2' targert lvl 1  2x maze lvl 1, (67% then 65%) 2' targert lvl 1 2x maze lvl 1, (70% then 77%) 2' targert lvl 1      SLS NV 3x30\" foa     3x30\" foam 3x30\" foam      Balance Beam Ambulation NV     5 laps 5 laps on foam      Tandem Stance       2x30\" ea on foam EC 2x30: ea on foam EC      Wall clocks             Wall walks              Deep Neck flex supine             Foam roll protocol  8'    8'        Pain/postural ucation, dx edu, vestibular edu             Ther Ex 4/1 4/4 4/11 4/16 4/23 4/25 5/1      UBE 3'/3' 3'/3' 3'/3' 3'/3' 3'/3' 3'/3' 3'/3'      Open Books             Cervical SNAGs             Crossbody Str  10x3\" L           Scap stretch              Median Nerve Glide  20x    20x        No moneys/ horizontal abd             T/S ext, rot             Tband rows, ext   20x e 17# 20x ea 17#         B/l ER             B/l IR             D2 Flexion             Pball roll out               Quad Ts, Ys, Ws as able           "   Prone I, T, Y 20x ea SB 3#  20x ea SB 3# 20x ea SB 3# 20x ea        Shoulder SHurgs             Scap Squeezes             Coleman Raises 2x20 5#            Scap Punches 2x20 5#            Scap border stretch              Ther Activity             Patient education               Elliptical              Gait Training                                         Modalities     3/12        [facility]     10' prone

## 2025-05-06 ENCOUNTER — TELEPHONE (OUTPATIENT)
Age: 40
End: 2025-05-06

## 2025-05-06 NOTE — TELEPHONE ENCOUNTER
Medical Records were being requested from 03/14/2025 to current, however patient has not been seen since 01/30/2025. No further action.

## 2025-05-07 ENCOUNTER — OFFICE VISIT (OUTPATIENT)
Dept: PHYSICAL THERAPY | Facility: CLINIC | Age: 40
End: 2025-05-07
Payer: COMMERCIAL

## 2025-05-07 DIAGNOSIS — M54.6 CHRONIC LEFT-SIDED THORACIC BACK PAIN: ICD-10-CM

## 2025-05-07 DIAGNOSIS — G89.29 NECK PAIN, CHRONIC: Primary | ICD-10-CM

## 2025-05-07 DIAGNOSIS — M54.2 NECK PAIN, CHRONIC: Primary | ICD-10-CM

## 2025-05-07 DIAGNOSIS — S06.0X0D CONCUSSION WITHOUT LOSS OF CONSCIOUSNESS, SUBSEQUENT ENCOUNTER: ICD-10-CM

## 2025-05-07 DIAGNOSIS — G89.29 CHRONIC LEFT-SIDED THORACIC BACK PAIN: ICD-10-CM

## 2025-05-07 DIAGNOSIS — H83.2X3 VESTIBULAR HYPOFUNCTION OF BOTH EARS: ICD-10-CM

## 2025-05-07 PROCEDURE — 97112 NEUROMUSCULAR REEDUCATION: CPT

## 2025-05-07 PROCEDURE — 97110 THERAPEUTIC EXERCISES: CPT

## 2025-05-07 NOTE — PROGRESS NOTES
Daily Note     Today's date: 2025  Patient name: Elvira Grant  : 1985  MRN: [id number]  Referring provider: Enrique Colmenares MD  Dx:   Encounter Diagnosis     ICD-10-CM    1. Neck pain, chronic  M54.2     G89.29       2. Chronic left-sided thoracic back pain  M54.6     G89.29       3. Vestibular hypofunction of both ears  H83.2X3       4. Concussion without loss of consciousness, subsequent encounter  S06.0X0D           Start Time: 1610  Stop Time: 1650  Total time in clinic (min): 40 minutes    Subjective: Pt reports that he needed to take an ibuprofen prior to today's session due to discomfort in his neck and thoracic spine, but overall has noted he has been feeling slightly better due to being out of work.       Objective: See treatment diary below      Assessment: Pt tolerated treatment well. Due to pt feeling slightly better overall coming int today's session and UE strengthening exercises being held in past few sessions due to symptom exacerbation, today's main focus was placed on improve the overall strength and endurance to all UE musculature to build and improved overall functional tolerance and performance with ADLs. Several shoulder strengthening exercises were progressed in intensity by 1# each, which pt was challenged with. Pt had increased symptom intensity, mainly in his neck with most shoulder and thoracic spine exercises, but with sufficient rest breaks taken throughout the PT session, he was able to complete all sets and reps with proper form. Patient exhibited good technique with therapeutic exercises and would benefit from continued PT      Plan: Continue per plan of care.  Progress treatment as tolerated.       Precautions:       Date      Visit # 61 62 63 64 65 [phone] 68     FOTO             Re-eval                   Focus on NM control of Left scapula and posterior cervical musculature.      Manuals     "  T/S Mobe PWK j-scoop   PWK j-scoop  PQK j-scoop  PWK j-scoop Pwk j-scoop      UT/LS Str     PWK PWK PWK      Scapular Mob PWK            Cervical Mob PWK  PWK  PWK PWK PWK      R Shldr PROM and STM             Cupping              CS gentle traction PWK  PWK MM PWK PWK PWK      IASTM             Median Nerve Flossing     PWK L PWK L PWK L      T spine STM PWK  PWK MM PWK  PWK  PWK       Cervical Traction     PWK SOR PWK SOR PWK SOR      Vestibular Testing             Neuro Re-Ed 4/1 4/4 4/11 4/16 4/23 4/25 4/25 5/7     Pt Education      2x30\"  2x30\"      VOR 1 horizontal NV 3x30\" 3x30\"  3x30\"   2x30\"  2x30\"       VOR 1 Vertical NV 3x30\" 3x30\"  3x30\"   2x30\"  2x30\"      VOR 2 Horizontal NV 3x30\" 3x30\"  3x30\"   2x30\"  2x30\"       VOR 2 Vertical NV 3x30\" 3x30\"  3x30\"          Gaze stabilization NV 10x3\"            Y lift              Chin tucks              Prone serratus              Biodex NV 2x maze lvl 3 2' target 2x maze lvl 1, 2' targert lvl 1 2x maze lvl 1, 2' targert lvl 1  2x maze lvl 1, (67% then 65%) 2' targert lvl 1 2x maze lvl 1, (70% then 77%) 2' targert lvl 1 2x maze lvl 1, (77% then 62%) 2' targert lvl 1     SLS NV 3x30\" foa     3x30\" foam 3x30\" foam 2x30\" ea foam EC     Balance Beam Ambulation NV     5 laps 5 laps on foam 5 laps on foam     Tandem Stance       2x30\" ea on foam EC 2x30: ea on foam EC 2x30 ea on foam EC     Wall clocks             Wall walks              Deep Neck flex supine             Foam roll protocol  8'    8'        Pain/postural ucation, dx edu, vestibular edu             Ther Ex 4/1 4/4 4/11 4/16 4/23 4/25 5/1 5/7     UBE 3'/3' 3'/3' 3'/3' 3'/3' 3'/3' 3'/3' 3'/3' 3'/3'     Open Books             Cervical SNAGs             Crossbody Str  10x3\" L           Scap stretch              Median Nerve Glide  20x    20x        No moneys/ horizontal abd             T/S ext, rot             Tband rows, ext   20x e 17# 20x ea 17#    20x ea 18#     B/l ER        20x ea 6#     B/l IR        " 20x ea 6#     D2 Flexion             Pball roll out               Quad Ts, Ys, Ws as able             Prone I, T, Y 20x ea SB 3#  20x ea SB 3# 20x ea SB 3# 20x ea   20x ea 4# SB     Shoulder SHurgs             Scap Squeezes             Coleman Raises 2x20 5#       20x 6#     Scap Punches 2x20 5#       20x 6#     Scap border stretch              Ther Activity        5/7     Patient education               Elliptical              Gait Training                                         Modalities     3/12        [facility]     10' prone

## 2025-05-09 ENCOUNTER — OFFICE VISIT (OUTPATIENT)
Dept: PHYSICAL THERAPY | Facility: CLINIC | Age: 40
End: 2025-05-09
Payer: COMMERCIAL

## 2025-05-09 DIAGNOSIS — G89.29 CHRONIC LEFT-SIDED THORACIC BACK PAIN: ICD-10-CM

## 2025-05-09 DIAGNOSIS — M54.6 CHRONIC LEFT-SIDED THORACIC BACK PAIN: ICD-10-CM

## 2025-05-09 DIAGNOSIS — H83.2X3 VESTIBULAR HYPOFUNCTION OF BOTH EARS: ICD-10-CM

## 2025-05-09 DIAGNOSIS — S06.0X0D CONCUSSION WITHOUT LOSS OF CONSCIOUSNESS, SUBSEQUENT ENCOUNTER: ICD-10-CM

## 2025-05-09 DIAGNOSIS — G89.29 NECK PAIN, CHRONIC: Primary | ICD-10-CM

## 2025-05-09 DIAGNOSIS — M54.2 NECK PAIN, CHRONIC: Primary | ICD-10-CM

## 2025-05-09 PROCEDURE — 97110 THERAPEUTIC EXERCISES: CPT

## 2025-05-09 PROCEDURE — 97112 NEUROMUSCULAR REEDUCATION: CPT

## 2025-05-09 NOTE — PROGRESS NOTES
Daily Note     Today's date: 2025  Patient name: Elvira Grant  : 1985  MRN: [id number]  Referring provider: Enrique Colmenares MD  Dx:   Encounter Diagnosis     ICD-10-CM    1. Neck pain, chronic  M54.2     G89.29       2. Chronic left-sided thoracic back pain  M54.6     G89.29       3. Vestibular hypofunction of both ears  H83.2X3       4. Concussion without loss of consciousness, subsequent encounter  S06.0X0D           Start Time: 935  Stop Time: 101  Total time in clinic (min): 40 minutes    Subjective: Pt reports that the spot where he received the injection in his cervical spine is very sore, noting increased pain with any cervcal extension. He noted the increased discomfort in his neck is likely due to raining weather. Pt noted that he has increased soreness after last PT session.      Objective: See treatment diary below      Assessment: Pt tolerated treatment well. Performed foam roller protocol as warm up during today's session to attempt to decreased overall symptom intensity, improve spinal mobility globally, and reduce overall tension in all associated musculature. Pt showed improved overall tolerance to balance exercises, performing SLS with eyes closed, as well as less sway seen in tandem stance. Pt also showed improved score with maze control on 42Floors with improve accuracy and decreased time to complete. Kept all other strengthening exercises at the same intensity due to noting increased fatigue after last PT session, and discomfort noted coming into today's session. Patient exhibited good technique with therapeutic exercises and would benefit from continued PT      Plan: Continue per plan of care.  Progress treatment as tolerated.       Precautions:       Date     Visit # 61 62 63 64 65 [phone] 69    FOTO             Re-eval                   Focus on NM control of Left scapula and posterior cervical musculature.      Manuals   "4/16 4/23 4/25 5/1 5/7 5/9    T/S Mobe PWK j-scoop   PWK j-scoop  PQK j-scoop  PWK j-scoop Pwk j-scoop      UT/LS Str     PWK PWK PWK      Scapular Mob PWK            Cervical Mob PWK  PWK  PWK PWK PWK      R Shldr PROM and STM             Cupping              CS gentle traction PWK  PWK MM PWK PWK PWK      IASTM             Median Nerve Flossing     PWK L PWK L PWK L      T spine STM PWK  PWK MM PWK  PWK  PWK       Cervical Traction     PWK SOR PWK SOR PWK SOR      Vestibular Testing             Neuro Re-Ed 4/1 4/4 4/11 4/16 4/23 4/25 4/25 5/7 5/9    Pt Education      2x30\"  2x30\"      VOR 1 horizontal NV 3x30\" 3x30\"  3x30\"   2x30\"  2x30\"       VOR 1 Vertical NV 3x30\" 3x30\"  3x30\"   2x30\"  2x30\"      VOR 2 Horizontal NV 3x30\" 3x30\"  3x30\"   2x30\"  2x30\"       VOR 2 Vertical NV 3x30\" 3x30\"  3x30\"          Gaze stabilization NV 10x3\"            Y lift              Chin tucks              Prone serratus              Biodex NV 2x maze lvl 3 2' target 2x maze lvl 1, 2' targert lvl 1 2x maze lvl 1, 2' targert lvl 1  2x maze lvl 1, (67% then 65%) 2' targert lvl 1 2x maze lvl 1, (70% then 77%) 2' targert lvl 1 2x maze lvl 1, (77% then 62%) 2' targert lvl 1 2x maze lvl 1, (79% then 87%) 2' targert lvl 1 (90%)    SLS NV 3x30\" foa     3x30\" foam 3x30\" foam 2x30\" ea foam EC 2x30\" ea foam EC    Balance Beam Ambulation NV     5 laps 5 laps on foam 5 laps on foam     Tandem Stance       2x30\" ea on foam EC 2x30: ea on foam EC 2x30 ea on foam EC 2x30 ea on foam EC    Wall clocks             Wall walks              Deep Neck flex supine             Foam roll protocol  8'    8'    8'    Pain/postural ucation, dx edu, vestibular edu             Ther Ex 4/1 4/4 4/11 4/16 4/23 4/25 5/1 5/7 5/9    UBE 3'/3' 3'/3' 3'/3' 3'/3' 3'/3' 3'/3' 3'/3' 3'/3'     Open Books             Cervical SNAGs             Crossbody Str  10x3\" L           Scap stretch              Median Nerve Glide  20x    20x        No moneys/ horizontal abd           "   T/S ext, rot             Tband rows, ext   20x e 17# 20x ea 17#    20x ea 18# 20x ea 18#    B/l ER        20x ea 6# 20x ea 6#    B/l IR        20x ea 6# 20x ea 6#    D2 Flexion             Pball roll out               Quad Ts, Ys, Ws as able             Prone I, T, Y 20x ea SB 3#  20x ea SB 3# 20x ea SB 3# 20x ea   20x ea 4# SB 20x ea 4#    Shoulder SHurgs             Scap Squeezes             Coleman Raises 2x20 5#       20x 6# 20x 6#    Scap Punches 2x20 5#       20x 6# 20x 6#    Scap border stretch              Ther Activity        5/7     Patient education               Elliptical              Gait Training                                         Modalities     3/12        [facility]     10' prone

## 2025-05-14 ENCOUNTER — OFFICE VISIT (OUTPATIENT)
Dept: PHYSICAL THERAPY | Facility: CLINIC | Age: 40
End: 2025-05-14
Payer: COMMERCIAL

## 2025-05-14 DIAGNOSIS — M54.2 NECK PAIN, CHRONIC: Primary | ICD-10-CM

## 2025-05-14 DIAGNOSIS — G89.29 NECK PAIN, CHRONIC: Primary | ICD-10-CM

## 2025-05-14 DIAGNOSIS — S06.0X0D CONCUSSION WITHOUT LOSS OF CONSCIOUSNESS, SUBSEQUENT ENCOUNTER: ICD-10-CM

## 2025-05-14 DIAGNOSIS — M54.6 CHRONIC LEFT-SIDED THORACIC BACK PAIN: ICD-10-CM

## 2025-05-14 DIAGNOSIS — G89.29 CHRONIC LEFT-SIDED THORACIC BACK PAIN: ICD-10-CM

## 2025-05-14 DIAGNOSIS — H83.2X3 VESTIBULAR HYPOFUNCTION OF BOTH EARS: ICD-10-CM

## 2025-05-14 PROCEDURE — 97140 MANUAL THERAPY 1/> REGIONS: CPT

## 2025-05-14 PROCEDURE — 97112 NEUROMUSCULAR REEDUCATION: CPT

## 2025-05-14 PROCEDURE — 97110 THERAPEUTIC EXERCISES: CPT

## 2025-05-14 NOTE — PROGRESS NOTES
Daily Note     Today's date: 2025  Patient name: Elvira Grant  : 1985  MRN: [id number]  Referring provider: Enrique Colmenares MD  Dx:   Encounter Diagnosis     ICD-10-CM    1. Neck pain, chronic  M54.2     G89.29       2. Chronic left-sided thoracic back pain  M54.6     G89.29       3. Vestibular hypofunction of both ears  H83.2X3       4. Concussion without loss of consciousness, subsequent encounter  S06.0X0D           Start Time: 1605  Stop Time: 1645  Total time in clinic (min): 40 minutes    Subjective: Pt report that he did not take any medications today, along with the weather being rainy, he is having increased pain in the left side of his cervical and thoracic spine. He noted with recent PT sessions he has been feeling good overall, with only 1 day of soreness noted afterwards.      Objective: See treatment diary below      Assessment: Pt tolerated treatment well. Pt noted that during UBE he had increased symptom intensity in his left ribs and a throbbing internal pain sensation in his left biceps during UBE, which he noted decreased after completion of exercise. Pt did have increased tension throughout his thoracic and cervical spine seen during manual interventions. He responded well to all STM, stretching, and mobilizations, with slight overall reduction in symptom intensity post manual interventions. Progressed prone ITYs from 4# dumbbells to 5# dumbbells. Progressed resisted shoulder IR and Er from 6# to 7#. Progressed Corinth rows and extensions from 18# to 20#. Pt was challenged with all progressions made with slight symptom exacerbation both during and after exercises. Pt needed several rest breaks throughout all strengthening exercises, but once rest breaks were taken, he was able to complete all remaining sets and reps with proper form. Pt remains challenged with all VOR 1 and 2, horizontally and vertically,  with exacerbations of fogginess, double vision, swooshing, and  "floaters at end range movements periodically. Patient exhibited good technique with therapeutic exercises and would benefit from continued PT      Plan: Continue per plan of care.  Progress treatment as tolerated.       Precautions:       Date 4/1 4/4 4/11 4/16 4/23 4/25 5/1 5/7 5/9 5/14   Visit # 61 62 63 64 65 66 67 68 69 70   FOTO          XXX   Re-eval                   Focus on NM control of Left scapula and posterior cervical musculature.      Manuals 4/1 4/4 4/11 4/16 4/23 4/25 5/1 5/7 5/9 5/14   T/S Mobe PWK j-scoop   PWK j-scoop  PQK j-scoop  PWK j-scoop Pwk j-scoop   PWK j-scoop   UT/LS Str     PWK PWK PWK   PWK   Scapular Mob PWK            Cervical Mob PWK  PWK  PWK PWK PWK   PWK   R Shldr PROM and STM             Cupping              CS gentle traction PWK  PWK MM PWK PWK PWK   PWK   IASTM             Median Nerve Flossing     PWK L PWK L PWK L      T spine STM PWK  PWK MM PWK  PWK  PWK    PWK   Cervical Traction     PWK SOR PWK SOR PWK SOR   PWK SOR   Vestibular Testing             Neuro Re-Ed 4/1 4/4 4/11 4/16 4/23 4/25 4/25 5/7 5/9 5/14   Pt Education      2x30\"  2x30\"   2x30\"   VOR 1 horizontal NV 3x30\" 3x30\"  3x30\"   2x30\"  2x30\"    2x30\"   VOR 1 Vertical NV 3x30\" 3x30\"  3x30\"   2x30\"  2x30\"   2x30\"   VOR 2 Horizontal NV 3x30\" 3x30\"  3x30\"   2x30\"  2x30\"    2x30\"    VOR 2 Vertical NV 3x30\" 3x30\"  3x30\"          Gaze stabilization NV 10x3\"            Y lift              Chin tucks              Prone serratus              Biodex NV 2x maze lvl 3 2' target 2x maze lvl 1, 2' targert lvl 1 2x maze lvl 1, 2' targert lvl 1  2x maze lvl 1, (67% then 65%) 2' targert lvl 1 2x maze lvl 1, (70% then 77%) 2' targert lvl 1 2x maze lvl 1, (77% then 62%) 2' targert lvl 1 2x maze lvl 1, (79% then 87%) 2' targert lvl 1 (90%)    SLS NV 3x30\" foa     3x30\" foam 3x30\" foam 2x30\" ea foam EC 2x30\" ea foam EC    Balance Beam Ambulation NV     5 laps 5 laps on foam 5 laps on foam     Tandem Stance       2x30\" ea on foam EC " "2x30: ea on foam EC 2x30 ea on foam EC 2x30 ea on foam EC    Wall clocks             Wall walks              Deep Neck flex supine             Foam roll protocol  8'    8'    8'    Pain/postural ucation, dx edu, vestibular edu             Ther Ex 4/1 4/4 4/11 4/16 4/23 4/25 5/1 5/7 5/9 5/14   UBE 3'/3' 3'/3' 3'/3' 3'/3' 3'/3' 3'/3' 3'/3' 3'/3'  3'/3'   Open Books             Cervical SNAGs             Crossbody Str  10x3\" L           Scap stretch              Median Nerve Glide  20x    20x        No moneys/ horizontal abd             T/S ext, rot             Tband rows, ext   20x e 17# 20x ea 17#    20x ea 18# 20x ea 18# 20x ea 20#   B/l ER        20x ea 6# 20x ea 6# 20x ea 7#   B/l IR        20x ea 6# 20x ea 6# 20x ea 7#   D2 Flexion             Pball roll out               Quad Ts, Ys, Ws as able             Prone I, T, Y 20x ea SB 3#  20x ea SB 3# 20x ea SB 3# 20x ea   20x ea 4# SB 20x ea 4# 20x ea 5#   Shoulder SHurgs             Scap Squeezes             Coleman Raises 2x20 5#       20x 6# 20x 6#    Scap Punches 2x20 5#       20x 6# 20x 6#    Scap border stretch              Ther Activity        5/7     Patient education               Elliptical              Gait Training                                         Modalities     3/12        [facility]     10' prone                                                                                   "

## 2025-05-16 ENCOUNTER — OFFICE VISIT (OUTPATIENT)
Dept: PHYSICAL THERAPY | Facility: CLINIC | Age: 40
End: 2025-05-16
Payer: COMMERCIAL

## 2025-05-16 DIAGNOSIS — G89.29 CHRONIC LEFT-SIDED THORACIC BACK PAIN: ICD-10-CM

## 2025-05-16 DIAGNOSIS — G89.29 NECK PAIN, CHRONIC: Primary | ICD-10-CM

## 2025-05-16 DIAGNOSIS — M54.2 NECK PAIN, CHRONIC: Primary | ICD-10-CM

## 2025-05-16 DIAGNOSIS — H83.2X3 VESTIBULAR HYPOFUNCTION OF BOTH EARS: ICD-10-CM

## 2025-05-16 DIAGNOSIS — M54.6 CHRONIC LEFT-SIDED THORACIC BACK PAIN: ICD-10-CM

## 2025-05-16 DIAGNOSIS — S06.0X0D CONCUSSION WITHOUT LOSS OF CONSCIOUSNESS, SUBSEQUENT ENCOUNTER: ICD-10-CM

## 2025-05-16 PROCEDURE — 97112 NEUROMUSCULAR REEDUCATION: CPT

## 2025-05-16 PROCEDURE — 97110 THERAPEUTIC EXERCISES: CPT

## 2025-05-16 PROCEDURE — 97010 HOT OR COLD PACKS THERAPY: CPT

## 2025-05-16 PROCEDURE — 97140 MANUAL THERAPY 1/> REGIONS: CPT

## 2025-05-16 NOTE — PROGRESS NOTES
Daily Note     Today's date: 2025  Patient name: Elvira Grant  : 1985  MRN: [id number]  Referring provider: Enrique Colmenares MD  Dx:   Encounter Diagnosis     ICD-10-CM    1. Neck pain, chronic  M54.2     G89.29       2. Chronic left-sided thoracic back pain  M54.6     G89.29       3. Vestibular hypofunction of both ears  H83.2X3       4. Concussion without loss of consciousness, subsequent encounter  S06.0X0D           Start Time: 935  Stop Time:   Total time in clinic (min): 55 minutes    Subjective: Pt reports that he is having increased pain in his neck, with difficulty with cervical extension. Pt also noted overall soreness after last PT session due to progressions that were made.       Objective: See treatment diary below      Assessment: Pt tolerated treatment well. Due to pt noting increased overall symptom exacerbation due to progresions last session, along with increased pain in his neck, utilzied MHP after UBE warm up to attempt to decrease overall symptom intensity and associated musculature tension. Continued remainder today's session at same intensity as previous visit due to pt being appropriately challenged during last visit, progressed exercises last session, and slight increase in soreness after last session. Pt showed improved tolerance to activity and performance during today's session, being able to complete all sets and reps with appropriate levels of fatigue post session. Pt had good recall of all exercises throughout the session with no need for form correction from PT. With VOR horizontally, pt had increased pressure in his right eye with end range movements, as well as floaters. With VOR vertically, pt noted double vision with both flexion and extension at end ranges.  Patient exhibited good technique with therapeutic exercises and would benefit from continued PT      Plan: Continue per plan of care.  Progress treatment as tolerated.       Precautions:       Date    " 4/16 4/23 4/25 5/1 5/7 5/9 5/14   Visit #    64 65 66 67 68 69 70   FOTO          XXX   Re-eval                   Focus on NM control of Left scapula and posterior cervical musculature.      Manuals 5/16 4/16 4/23 4/25 5/1 5/7 5/9 5/14   T/S Mobe PWK j-scoop    PQK j-scoop  PWK j-scoop Pwk j-scoop   PWK j-scoop   UT/LS Str PWK    PWK PWK PWK   PWK   Scapular Mob             Cervical Mob PWK    PWK PWK PWK   PWK   R Shldr PROM and STM             Cupping              CS gentle traction PWK   MM PWK PWK PWK   PWK   IASTM             Median Nerve Flossing     PWK L PWK L PWK L      T spine STM PWK   MM PWK  PWK  PWK    PWK   Cervical Traction PWK SOR    PWK SOR PWK SOR PWK SOR   PWK SOR   Vestibular Testing             Neuro Re-Ed 5/16 4/16 4/23 4/25 4/25 5/7 5/9 5/14   Pt Education 2x30\"     2x30\"  2x30\"   2x30\"   VOR 1 horizontal 2x30\"    3x30\"   2x30\"  2x30\"    2x30\"   VOR 1 Vertical 2x30\"   3x30\"   2x30\"  2x30\"   2x30\"   VOR 2 Horizontal 2x30\"    3x30\"   2x30\"  2x30\"    2x30\"    VOR 2 Vertical    3x30\"          Gaze stabilization             Y lift              Chin tucks              Prone serratus              Biodex    2x maze lvl 1, 2' targert lvl 1  2x maze lvl 1, (67% then 65%) 2' targert lvl 1 2x maze lvl 1, (70% then 77%) 2' targert lvl 1 2x maze lvl 1, (77% then 62%) 2' targert lvl 1 2x maze lvl 1, (79% then 87%) 2' targert lvl 1 (90%)    SLS      3x30\" foam 3x30\" foam 2x30\" ea foam EC 2x30\" ea foam EC    Balance Beam Ambulation      5 laps 5 laps on foam 5 laps on foam     Tandem Stance       2x30\" ea on foam EC 2x30: ea on foam EC 2x30 ea on foam EC 2x30 ea on foam EC    Wall clocks             Wall walks              Deep Neck flex supine             Foam roll protocol      8'    8'    Pain/postural ucation, dx edu, vestibular edu             Ther Ex 5/16 4/16 4/23 4/25 5/1 5/7 5/9 5/14   UBE 3'/3'   3'/3' 3'/3' 3'/3' 3'/3' 3'/3'  3'/3'   Open Books             Cervical SNAGs           "   Crossbody Str             Scap stretch              Median Nerve Glide     20x        No moneys/ horizontal abd             T/S ext, rot             Tband rows, ext 20x ea 20#   20x ea 17#    20x ea 18# 20x ea 18# 20x ea 20#   B/l ER 20x ea 7#       20x ea 6# 20x ea 6# 20x ea 7#   B/l IR 2x0 ea 7#       20x ea 6# 20x ea 6# 20x ea 7#   D2 Flexion             Pball roll out              Quad Ts, Ys, Ws as able             Prone I, T, Y 20x ea 5#   20x ea SB 3# 20x ea   20x ea 4# SB 20x ea 4# 20x ea 5#   Shoulder SHurgs             Scap Squeezes             Coleman Raises        20x 6# 20x 6#    Scap Punches        20x 6# 20x 6#    Scap border stretch              Ther Activity        5/7     Patient education               Elliptical              Gait Training                                         Modalities 5/16    3/12        MHP 10' Prone    10' prone

## 2025-05-23 ENCOUNTER — OFFICE VISIT (OUTPATIENT)
Dept: PHYSICAL THERAPY | Facility: CLINIC | Age: 40
End: 2025-05-23
Payer: COMMERCIAL

## 2025-05-23 DIAGNOSIS — M54.2 NECK PAIN, CHRONIC: Primary | ICD-10-CM

## 2025-05-23 DIAGNOSIS — S06.0X0D CONCUSSION WITHOUT LOSS OF CONSCIOUSNESS, SUBSEQUENT ENCOUNTER: ICD-10-CM

## 2025-05-23 DIAGNOSIS — M54.6 CHRONIC LEFT-SIDED THORACIC BACK PAIN: ICD-10-CM

## 2025-05-23 DIAGNOSIS — H83.2X3 VESTIBULAR HYPOFUNCTION OF BOTH EARS: ICD-10-CM

## 2025-05-23 DIAGNOSIS — G89.29 NECK PAIN, CHRONIC: Primary | ICD-10-CM

## 2025-05-23 DIAGNOSIS — G89.29 CHRONIC LEFT-SIDED THORACIC BACK PAIN: ICD-10-CM

## 2025-05-23 PROCEDURE — 97110 THERAPEUTIC EXERCISES: CPT

## 2025-05-23 PROCEDURE — 97140 MANUAL THERAPY 1/> REGIONS: CPT

## 2025-05-23 PROCEDURE — 97010 HOT OR COLD PACKS THERAPY: CPT

## 2025-05-23 PROCEDURE — 97112 NEUROMUSCULAR REEDUCATION: CPT

## 2025-05-23 NOTE — PROGRESS NOTES
Daily Note     Today's date: 2025  Patient name: Elvira Grant  : 1985  MRN: [id number]  Referring provider: Enrique Colmenares MD  Dx:   Encounter Diagnosis     ICD-10-CM    1. Neck pain, chronic  M54.2     G89.29       2. Chronic left-sided thoracic back pain  M54.6     G89.29       3. Vestibular hypofunction of both ears  H83.2X3       4. Concussion without loss of consciousness, subsequent encounter  S06.0X0D           Start Time: 935  Stop Time:   Total time in clinic (min): 50 minutes    Subjective: Pt reports that today we woke up with increased fogginess. He also noted that he woke up with increased symptoms in his thoracic spine with more pain behind his shoulder blades.       Objective: See treatment diary below      Assessment: Pt tolerated treatment well. Pt had increased symptom intensity in his left shoulder and arm, both during and after UBE, where he noted tingling and jumping sensation. After warm up, MHP was utilized to attempt to decrease overall symptom intensity, reduce tension in musculature, and improve overall mobility, which he responded well to. After, manual interventions were performed which desired results were achieved with improvements overall after manuals. Patient exhibited good technique with therapeutic exercises and would benefit from continued PT      Plan: Continue per plan of care.  Progress treatment as tolerated.       Precautions:       Date    Visit # 71 72  64 65 66 67 68 69 70   FOTO          XXX   Re-eval                   Focus on NM control of Left scapula and posterior cervical musculature.      Manuals    T/S Mobe PWK j-scoop    PQK j-scoop  PWK j-scoop Pwk j-scoop   PWK j-scoop   UT/LS Str PWK PWK   PWK PWK PWK   PWK   Scapular Mob  PWK           Cervical Mob PWK PWK   PWK PWK PWK   PWK   R Shldr PROM and STM             Cupping              CS gentle traction  "PWK PWK  MM PWK PWK PWK   PWK   IASTM             Median Nerve Flossing     PWK L PWK L PWK L      T spine STM PWK PWK  MM PWK  PWK  PWK    PWK   Cervical Traction PWK SOR Pwk SOR   PWK SOR PWK SOR PWK SOR   PWK SOR   Vestibular Testing             Neuro Re-Ed 5/16 5/23 4/16 4/23 4/25 4/25 5/7 5/9 5/14   Pt Education 2x30\"     2x30\"  2x30\"   2x30\"   VOR 1 horizontal 2x30\"    3x30\"   2x30\"  2x30\"    2x30\"   VOR 1 Vertical 2x30\"   3x30\"   2x30\"  2x30\"   2x30\"   VOR 2 Horizontal 2x30\"    3x30\"   2x30\"  2x30\"    2x30\"    VOR 2 Vertical    3x30\"          Gaze stabilization             Y lift              Chin tucks              Prone serratus              Biodex    2x maze lvl 1, 2' targert lvl 1  2x maze lvl 1, (67% then 65%) 2' targert lvl 1 2x maze lvl 1, (70% then 77%) 2' targert lvl 1 2x maze lvl 1, (77% then 62%) 2' targert lvl 1 2x maze lvl 1, (79% then 87%) 2' targert lvl 1 (90%)    SLS      3x30\" foam 3x30\" foam 2x30\" ea foam EC 2x30\" ea foam EC    Balance Beam Ambulation      5 laps 5 laps on foam 5 laps on foam     Tandem Stance       2x30\" ea on foam EC 2x30: ea on foam EC 2x30 ea on foam EC 2x30 ea on foam EC    Wall clocks             Wall walks              Deep Neck flex supine             Foam roll protocol   8'   8'    8'    Pain/postural ucation, dx edu, vestibular edu             Ther Ex 5/16 5/23 4/16 4/23 4/25 5/1 5/7 5/9 5/14   UBE 3'/3' 3'/3'  3'/3' 3'/3' 3'/3' 3'/3' 3'/3'  3'/3'   Open Books  15x3\"            Cervical SNAGs             Crossbody Str             Scap stretch              Median Nerve Glide     20x        No moneys/ horizontal abd             T/S ext, rot             Tband rows, ext 20x ea 20#   20x ea 17#    20x ea 18# 20x ea 18# 20x ea 20#   B/l ER 20x ea 7#       20x ea 6# 20x ea 6# 20x ea 7#   B/l IR 2x0 ea 7#       20x ea 6# 20x ea 6# 20x ea 7#   D2 Flexion             Pball roll out              Quad Ts, Ys, Ws as able             Prone I, T, Y 20x ea 5#   20x ea SB 3# 20x " ea   20x ea 4# SB 20x ea 4# 20x ea 5#   Shoulder SHurgs             Scap Squeezes             Coleman Raises        20x 6# 20x 6#    Scap Punches        20x 6# 20x 6#    Scap border stretch              Ther Activity        5/7     Patient education               Elliptical              Gait Training                                         Modalities 5/16    3/12        P 10' Prone 10' prone   10' prone

## 2025-05-27 ENCOUNTER — OFFICE VISIT (OUTPATIENT)
Dept: PHYSICAL THERAPY | Facility: CLINIC | Age: 40
End: 2025-05-27
Payer: COMMERCIAL

## 2025-05-27 ENCOUNTER — APPOINTMENT (OUTPATIENT)
Dept: PHYSICAL THERAPY | Facility: CLINIC | Age: 40
End: 2025-05-27
Payer: COMMERCIAL

## 2025-05-27 DIAGNOSIS — H83.2X3 VESTIBULAR HYPOFUNCTION OF BOTH EARS: ICD-10-CM

## 2025-05-27 DIAGNOSIS — S06.0X0D CONCUSSION WITHOUT LOSS OF CONSCIOUSNESS, SUBSEQUENT ENCOUNTER: ICD-10-CM

## 2025-05-27 DIAGNOSIS — M54.6 CHRONIC LEFT-SIDED THORACIC BACK PAIN: ICD-10-CM

## 2025-05-27 DIAGNOSIS — G89.29 CHRONIC LEFT-SIDED THORACIC BACK PAIN: ICD-10-CM

## 2025-05-27 DIAGNOSIS — G89.29 NECK PAIN, CHRONIC: Primary | ICD-10-CM

## 2025-05-27 DIAGNOSIS — M54.2 NECK PAIN, CHRONIC: Primary | ICD-10-CM

## 2025-05-27 PROCEDURE — 97112 NEUROMUSCULAR REEDUCATION: CPT

## 2025-05-27 PROCEDURE — 97010 HOT OR COLD PACKS THERAPY: CPT

## 2025-05-27 PROCEDURE — 97140 MANUAL THERAPY 1/> REGIONS: CPT

## 2025-05-27 NOTE — PROGRESS NOTES
"Daily Note     Today's date: 2025  Patient name: Elvira Grant  : 1985  MRN: [id number]  Referring provider: Enrique Colmenares MD  Dx:   Encounter Diagnosis     ICD-10-CM    1. Neck pain, chronic  M54.2     G89.29       2. Chronic left-sided thoracic back pain  M54.6     G89.29       3. Vestibular hypofunction of both ears  H83.2X3       4. Concussion without loss of consciousness, subsequent encounter  S06.0X0D           Start Time: 1500  Stop Time: 1555  Total time in clinic (min): 55 minutes    Subjective: Pt reports that overall his whole left side is very painful coming into today's session. He noted that after last PT session he had successful overall reduction in symptoms which lasted for 2-3 days post session before returning.       Objective: See treatment diary below      Assessment: Pt tolerated treatment well. Pt noted that he is still having that pain and discomfort during and following the first few minutes following UBE warm up. He noted that the pain \"feels like it is coming from within the bones and radiating outwards\". During the vestibular training with VOR he noted dizziness initially, which he noted was under control after around first 10 seconds. He noted that the most difficulty with all VOR during today's session was due to pain and discomfort in his cervical spine. During the later reps of VOR, pt reported \"my vision is starting to get a little blurry\", \"floaters at end of each ROM\", and \"swooshing\". Pt had increased sway present with SLS during today's visit,  with both right and left left SLS, needing UE support around every 5 seconds to help regain balance. Pt responded well to all manual interventions and MHP, with successful reduction of all symptom intensity post intervention. Patient exhibited good technique with therapeutic exercises and would benefit from continued PT      Plan: Continue per plan of care.  Progress treatment as tolerated.       Precautions: " "      Date 5/16 5/21 5/27 5/1 5/7 5/9 5/14   Visit # 71 72 73    67 68 69 70   FOTO          XXX   Re-eval                   Focus on NM control of Left scapula and posterior cervical musculature.      Manuals 5/16 5/21 5/27 5/1 5/7 5/9 5/14   T/S Mobe PWK j-scoop  PWK    Pwk j-scoop   PWK j-scoop   UT/LS Str PWK PWK PWK    PWK   PWK   Scapular Mob  PWK PWK          Cervical Mob PWK PWK PWK    PWK   PWK   R Shldr PROM and STM             Cupping              CS gentle traction PWK PWK PWK    PWK   PWK   IASTM             Median Nerve Flossing       PWK L      T spine STM PWK PWK PWK    PWK    PWK   Cervical Traction PWK SOR Pwk SOR PWK SOR    PWK SOR   PWK SOR   Vestibular Testing             Neuro Re-Ed 5/16 5/23 5/27 4/25 5/7 5/9 5/14   Pt Education 2x30\"      2x30\"   2x30\"   VOR 1 horizontal 2x30\"   2x30\"     2x30\"    2x30\"   VOR 1 Vertical 2x30\"  2x30\"     2x30\"   2x30\"   VOR 2 Horizontal 2x30\"   2x30\"     2x30\"    2x30\"    VOR 2 Vertical   20x3'           Gaze stabilization             Y lift              Chin tucks              Prone serratus              Biodex   x maze lvl 1, (83% then 76%) 2' targert lvl 1 (88%)    2x maze lvl 1, (70% then 77%) 2' targert lvl 1 2x maze lvl 1, (77% then 62%) 2' targert lvl 1 2x maze lvl 1, (79% then 87%) 2' targert lvl 1 (90%)    SLS   2x30\" EC on foam ea    3x30\" foam 2x30\" ea foam EC 2x30\" ea foam EC    Balance Beam Ambulation       5 laps on foam 5 laps on foam     Tandem Stance        2x30: ea on foam EC 2x30 ea on foam EC 2x30 ea on foam EC    Wall clocks             Wall walks              Deep Neck flex supine             Foam roll protocol   8'       8'    Pain/postural ucation, dx edu, vestibular edu             Ther Ex 5/16 5/23 5/27 5/1 5/7 5/9 5/14   UBE 3'/3' 3'/3' 3'/3'    3'/3' 3'/3'  3'/3'   Open Books  15x3\"            Cervical SNAGs             Crossbody Str             Scap stretch              Median Nerve [place]             No moneys/ " horizontal abd             T/S ext, rot             Tband rows, ext 20x ea 20#       20x ea 18# 20x ea 18# 20x ea 20#   B/l ER 20x ea 7#       20x ea 6# 20x ea 6# 20x ea 7#   B/l IR 2x0 ea 7#       20x ea 6# 20x ea 6# 20x ea 7#   D2 Flexion             Pball roll out              Quad Ts, Ys, Ws as able             Prone I, T, Y 20x ea 5#       20x ea 4# SB 20x ea 4# 20x ea 5#   Shoulder SHurgs             Scap Squeezes             Coleman Raises        20x 6# 20x 6#    Scap Punches        20x 6# 20x 6#    Scap border stretch              Ther Activity        5/7     Patient education               Elliptical              Gait Training                                         Modalities 5/16 5/27          [facility] [place] 10' Prone 10' prone 10' prone

## 2025-05-29 ENCOUNTER — APPOINTMENT (OUTPATIENT)
Dept: PHYSICAL THERAPY | Facility: CLINIC | Age: 40
End: 2025-05-29
Payer: COMMERCIAL

## 2025-05-30 ENCOUNTER — OFFICE VISIT (OUTPATIENT)
Dept: PHYSICAL THERAPY | Facility: CLINIC | Age: 40
End: 2025-05-30
Payer: COMMERCIAL

## 2025-05-30 DIAGNOSIS — G89.29 CHRONIC LEFT-SIDED THORACIC BACK PAIN: ICD-10-CM

## 2025-05-30 DIAGNOSIS — G89.29 NECK PAIN, CHRONIC: Primary | ICD-10-CM

## 2025-05-30 DIAGNOSIS — M54.6 CHRONIC LEFT-SIDED THORACIC BACK PAIN: ICD-10-CM

## 2025-05-30 DIAGNOSIS — M54.2 NECK PAIN, CHRONIC: Primary | ICD-10-CM

## 2025-05-30 DIAGNOSIS — S06.0X0D CONCUSSION WITHOUT LOSS OF CONSCIOUSNESS, SUBSEQUENT ENCOUNTER: ICD-10-CM

## 2025-05-30 DIAGNOSIS — H83.2X3 VESTIBULAR HYPOFUNCTION OF BOTH EARS: ICD-10-CM

## 2025-05-30 PROCEDURE — 97140 MANUAL THERAPY 1/> REGIONS: CPT

## 2025-05-30 PROCEDURE — 97112 NEUROMUSCULAR REEDUCATION: CPT

## 2025-05-30 NOTE — PROGRESS NOTES
Daily Note     Today's date: 2025  Patient name: Elvira Grant  : 1985  MRN: [id number]  Referring provider: Enrique Colmenares MD  Dx:   Encounter Diagnosis     ICD-10-CM    1. Neck pain, chronic  M54.2     G89.29       2. Chronic left-sided thoracic back pain  M54.6     G89.29       3. Vestibular hypofunction of both ears  H83.2X3       4. Concussion without loss of consciousness, subsequent encounter  S06.0X0D           Start Time: 1135  Stop Time: 1215  Total time in clinic (min): 40 minutes    Subjective: Pt reports that he is feeling better coming into today's session compared to previous visit.      Objective: See treatment diary below      Assessment: Pt tolerated treatment well. Utilized foam roller protocol as warm up during today's session to attempt to decreased tension in associated thoracic and cervical spine musculature and decreased overall symptom intensity. UBE was not performed during today's session due to pt reporting increased overall symptoms while performing warm up in recent sessions. Re-introduced wall clocks and wall walks to facilitate overall improvements in all shoulder, rotator cuff, scapular stabilization, and postural musculature. Pt had slight improvements with all balance and vestibular exercises performance, but noted similar symptoms as previous session during today's visit. With VOR pt noted floaters, strain, and pressure but no swooshing today. Patient exhibited good technique with therapeutic exercises and would benefit from continued PT      Plan: Continue per plan of care.  Progress treatment as tolerated.       Precautions:       Date    Visit # 71 72 73 74   67 68 69 70   FOTO          XXX   Re-eval                   Focus on NM control of Left scapula and posterior cervical musculature.      Manuals    T/S Mobe PWK j-scoop  PWK PWK   Pwk j-scoop   PWK j-scoop   UT/LS Str PWK PWK  "PWK PWK   PWK   PWK   Scapular Mob  PWK PWK PWK         Cervical Mob PWK PWK PWK PWK   PWK   PWK   R Shldr PROM and STM             Cupping              CS gentle traction PWK PWK PWK PWK   PWK   PWK   IASTM             Median Nerve Flossing       PWK L      T spine STM PWK PWK PWK PWK   PWK    PWK   Cervical Traction PWK SOR Pwk SOR PWK SOR PWK SOR   PWK SOR   PWK SOR   Vestibular Testing             Neuro Re-Ed 5/16 5/23 5/27 5/30 4/25 5/7 5/9 5/14   Pt Education 2x30\"      2x30\"   2x30\"   VOR 1 horizontal 2x30\"   2x30\"  2x30\"    2x30\"    2x30\"   VOR 1 Vertical 2x30\"  2x30\"  2x30\"   2x30\"   2x30\"   VOR 2 Horizontal 2x30\"   2x30\"  2x30\"    2x30\"    2x30\"    VOR 2 Vertical   20x3'  2x30\"         Gaze stabilization             Y lift              Chin tucks              Prone serratus              Biodex   x maze lvl 1, (83% then 76%) 2' targert lvl 1 (88%)    2x maze lvl 1, (70% then 77%) 2' targert lvl 1 2x maze lvl 1, (77% then 62%) 2' targert lvl 1 2x maze lvl 1, (79% then 87%) 2' targert lvl 1 (90%)    SLS   2x30\" EC on foam ea 2x30\" EC on foam ea   3x30\" foam 2x30\" ea foam EC 2x30\" ea foam EC    Balance Beam Ambulation       5 laps on foam 5 laps on foam     Tandem Stance        2x30: ea on foam EC 2x30 ea on foam EC 2x30 ea on foam EC    Wall clocks    10 laps ea GTB         Wall walks     10 laps ea GTB         Deep Neck flex supine             Foam roll protocol   8'  8'     8'    Pain/postural ucation, dx edu, vestibular edu             Ther Ex 5/16 5/23 5/27 5/1 5/7 5/9 5/14   UBE 3'/3' 3'/3' 3'/3'    3'/3' 3'/3'  3'/3'   Open Books  15x3\"            Cervical SNAGs             Crossbody Str             Scap stretch              Median Nerve [place]             No moneys/ horizontal abd             T/S ext, rot             Tband rows, ext 20x ea 20#       20x ea 18# 20x ea 18# 20x ea 20#   B/l ER 20x ea 7#       20x ea 6# 20x ea 6# 20x ea 7#   B/l IR 2x0 ea 7#       20x ea 6# 20x ea 6# 20x ea 7#   D2 " Flexion             Pball roll out              Quad Ts, Ys, Ws as able             Prone I, T, Y 20x ea 5#       20x ea 4# SB 20x ea 4# 20x ea 5#   Shoulder SHurgs             Scap Squeezes             Coleman Raises        20x 6# 20x 6#    Scap Punches        20x 6# 20x 6#    Scap border stretch              Ther Activity        5/7     Patient education               Elliptical              Gait Training                                         Modalities 5/16 5/27          [facility] 10' Prone 10' prone 10' prone

## 2025-06-04 ENCOUNTER — OFFICE VISIT (OUTPATIENT)
Dept: PHYSICAL THERAPY | Facility: CLINIC | Age: 40
End: 2025-06-04
Payer: COMMERCIAL

## 2025-06-04 DIAGNOSIS — G89.29 CHRONIC LEFT-SIDED THORACIC BACK PAIN: ICD-10-CM

## 2025-06-04 DIAGNOSIS — M54.2 NECK PAIN, CHRONIC: Primary | ICD-10-CM

## 2025-06-04 DIAGNOSIS — H83.2X3 VESTIBULAR HYPOFUNCTION OF BOTH EARS: ICD-10-CM

## 2025-06-04 DIAGNOSIS — S06.0X0D CONCUSSION WITHOUT LOSS OF CONSCIOUSNESS, SUBSEQUENT ENCOUNTER: ICD-10-CM

## 2025-06-04 DIAGNOSIS — G89.29 NECK PAIN, CHRONIC: Primary | ICD-10-CM

## 2025-06-04 DIAGNOSIS — M54.6 CHRONIC LEFT-SIDED THORACIC BACK PAIN: ICD-10-CM

## 2025-06-04 PROCEDURE — 97140 MANUAL THERAPY 1/> REGIONS: CPT

## 2025-06-04 PROCEDURE — 97112 NEUROMUSCULAR REEDUCATION: CPT

## 2025-06-04 PROCEDURE — 97110 THERAPEUTIC EXERCISES: CPT

## 2025-06-04 NOTE — PROGRESS NOTES
Daily Note     Today's date: 2025  Patient name: Elvira Grant  : 1985  MRN: [id number]  Referring provider: Enrique Colmenares MD  Dx:   Encounter Diagnosis     ICD-10-CM    1. Neck pain, chronic  M54.2     G89.29       2. Chronic left-sided thoracic back pain  M54.6     G89.29       3. Vestibular hypofunction of both ears  H83.2X3       4. Concussion without loss of consciousness, subsequent encounter  S06.0X0D           Start Time: 1600  Stop Time: 1640  Total time in clinic (min): 40 minutes    Subjective: Pt reports that he came from [place] where he had a visit with the concussion and neuro specialist. He noted that his symptoms are very aggravated from both the visit and 2 hours drive.       Objective: See treatment diary below      Assessment: Pt tolerated treatment fair. Due to pt just coming from specialist, no balance or vestibular training were performed during today's session.  During foam roller protocol for both back stroke and snow angles, pt had decreased shoulder ROM in both flexion and abduction. Along with decreased ROM, pt had increased symptom intensity in his left shoulder radiating from deep inside. Added in shoulder AAROM  and wall slides to attempt to improve end range shoulder ROM in all directions. Pt had mild symptom exacerbations at end range with all the newly added exercises, but was able to achieve further ROM than during foam roller protocol. Patient exhibited good technique with therapeutic exercises and would benefit from continued PT      Plan: Continue per plan of care.  Progress treatment as tolerated.       Precautions:       Date  5/   Visit # 71 72 73 74 75  67 68 69 70   FOTO          XXX   Re-eval                   Focus on NM control of Left scapula and posterior cervical musculature.      Manuals    T/S Mobe PWK j-scoop  PWK PWK PWK     PWK j-scoop   UT/LS Str PWK PWK PWK PWK       "PWK   Scapular Mob  PWK PWK PWK PWK        Cervical Mob PWK PWK PWK PWK PWK     PWK   Shoulder AROM     PWK        R Shldr PROM and STM             Cupping           6/4   CS gentle traction PWK PWK PWK PWK PWK     PWK   IASTM             Median Nerve Flossing             T spine STM PWK PWK PWK PWK PWK      PWK   Cervical Traction PWK SOR Pwk SOR PWK SOR PWK SOR PWK SOR     PWK SOR   Vestibular Testing             Neuro Re-Ed 5/16 5/23 5/27 5/30 5/9 5/14   Pt Education 2x30\"         2x30\"   VOR 1 horizontal 2x30\"   2x30\"  2x30\"       2x30\"   VOR 1 Vertical 2x30\"  2x30\"  2x30\"      2x30\"   VOR 2 Horizontal 2x30\"   2x30\"  2x30\"       2x30\"    VOR 2 Vertical   20x3'  2x30\"         Gaze stabilization             Y lift              Chin tucks              Prone serratus              Biodex   x maze lvl 1, (83% then 76%) 2' targert lvl 1 (88%)      2x maze lvl 1, (79% then 87%) 2' targert lvl 1 (90%)    SLS   2x30\" EC on foam ea 2x30\" EC on foam ea     2x30\" ea foam EC    Balance Beam Ambulation             Tandem Stance          2x30 ea on foam EC    Wall clocks    10 laps ea GTB         Wall walks     10 laps ea GTB         Deep Neck flex supine             Foam roll protocol   8'  8' 8'    8'    Pain/postural ucation, dx edu, vestibular edu             Ther Ex 5/16 5/23 5/27 6/4 5/9 5/14   UBE 3'/3' 3'/3' 3'/3'       3'/3'   Open Books  15x3\"            Cervical SNAGs             Crossbody Str             Shoulder AAROM      10x3\" 4# bar        Wall Slides     10x3\" flex/abd        Scap stretch              Median Nerve [place]             No moneys/ horizontal abd             T/S ext, rot             Tband rows, ext 20x ea 20#        20x ea 18# 20x ea 20#   B/l ER 20x ea 7#        20x ea 6# 20x ea 7#   B/l IR 2x0 ea 7#        20x ea 6# 20x ea 7#   D2 Flexion             Pball roll out              Quad Ts, Ys, Ws as able             Prone I, T, Y 20x ea 5#        20x ea 4# 20x ea 5#   Shoulder SHurgs           "   Scap Squeezes             Coleman Raises         20x 6#    Scap Punches         20x 6#    Scap border stretch              Ther Activity             Patient education               Elliptical              Gait Training                                         Modalities 5/16 5/27          P 10' Prone 10' prone 10' prone

## 2025-06-16 ENCOUNTER — OFFICE VISIT (OUTPATIENT)
Dept: PHYSICAL THERAPY | Facility: CLINIC | Age: 40
End: 2025-06-16
Payer: COMMERCIAL

## 2025-06-16 DIAGNOSIS — G89.29 NECK PAIN, CHRONIC: Primary | ICD-10-CM

## 2025-06-16 DIAGNOSIS — G89.29 CHRONIC LEFT-SIDED THORACIC BACK PAIN: ICD-10-CM

## 2025-06-16 DIAGNOSIS — S06.0X0D CONCUSSION WITHOUT LOSS OF CONSCIOUSNESS, SUBSEQUENT ENCOUNTER: ICD-10-CM

## 2025-06-16 DIAGNOSIS — M54.6 CHRONIC LEFT-SIDED THORACIC BACK PAIN: ICD-10-CM

## 2025-06-16 DIAGNOSIS — M54.2 NECK PAIN, CHRONIC: Primary | ICD-10-CM

## 2025-06-16 DIAGNOSIS — H83.2X3 VESTIBULAR HYPOFUNCTION OF BOTH EARS: ICD-10-CM

## 2025-06-16 PROCEDURE — 97110 THERAPEUTIC EXERCISES: CPT

## 2025-06-16 PROCEDURE — 97140 MANUAL THERAPY 1/> REGIONS: CPT

## 2025-06-16 PROCEDURE — 97112 NEUROMUSCULAR REEDUCATION: CPT

## 2025-06-16 NOTE — PROGRESS NOTES
Daily Note     Today's date: 2025  Patient name: Elvira Grant  : 1985  MRN: [id number]  Referring provider: Enrique Colmenares MD  Dx:   Encounter Diagnosis     ICD-10-CM    1. Neck pain, chronic  M54.2     G89.29       2. Chronic left-sided thoracic back pain  M54.6     G89.29       3. Vestibular hypofunction of both ears  H83.2X3       4. Concussion without loss of consciousness, subsequent encounter  S06.0X0D           Start Time: 0940  Stop Time: 1020  Total time in clinic (min): 40 minutes    Subjective: Elvira c/o cont pain in his neck and a lot in his shoulder blade region. Notes he has more discomfort when lifting his L arem over his head and is not able to lift it as far as his R shoulder.       Objective: See treatment diary below      Assessment: Elvira Tolerated treatment well with appropriate muscle fatigue experienced with ex's. He is making steady gains towards goals  and remains appropriately challenged with their program. Tightness and weakness in L rhomboids persists. Remains challenged with gaze stabilization ex's with increased speed and visual conflict. Noted ocular fatigue with undershooting and tracking difficulties noted during VOR.  Required vc's t/o session for proper positioning with ex's.  He would benefit from continued PT and compliance with HEP.        Plan: Continue per plan of care.      Precautions:       Date    Visit # 71 72 73 74 75  67 [phone]   FOTO          XXX   Re-eval                   Focus on NM control of Left scapula and posterior cervical musculature.      Manuals    T/S Mobe PWK j-scoop  PWK PWK PWK     PWK j-scoop   UT/LS Str PWK PWK PWK PWK      PWK   Scapular Mob  PWK PWK PWK PWK        Cervical Mob PWK PWK PWK PWK PWK     PWK   Shoulder AROM     PWK        R Shldr PROM and STM             Cupping              CS gentle traction PWK PWK PWK PWK PWK AAW    PWK   IASTM   "           Median Nerve Flossing             T spine STM PWK PWK PWK PWK PWK  AAW    PWK   Cervical Traction PWK SOR Pwk SOR PWK SOR PWK SOR PWK SOR AAW manual     PWK SOR   Vestibular Testing             Neuro Re-Ed 5/16 5/23 5/27 5/30 6/16 5/9 5/14   Pt Education 2x30\"         2x30\"   VOR 1 horizontal 2x30\"   2x30\"  2x30\"   60\"x2 metro 120    2x30\"   VOR 1 Vertical 2x30\"  2x30\"  2x30\"  60\"x2   Metro 120     2x30\"   VOR 2 Horizontal 2x30\"   2x30\"  2x30\"   60\"     2x30\"    VOR 2 Vertical   20x3'  2x30\"  60\"        Gaze stabilization             Y lift              Chin tucks              Prone serratus              Biodex   x maze lvl 1, (83% then 76%) 2' targert lvl 1 (88%)      2x maze lvl 1, (79% then 87%) 2' targert lvl 1 (90%)    SLS   2x30\" EC on foam ea 2x30\" EC on foam ea     2x30\" ea foam EC    Balance Beam Ambulation             Tandem Stance          2x30 ea on foam EC    Wall clocks    10 laps ea GTB         Wall walks     10 laps ea GTB         Deep Neck flex supine             Foam roll protocol   8'  8' 8' 10\"x10 ea    8'    Pain/postural ucation, dx edu, vestibular edu             Ther Ex 5/16 5/23 5/27  6/4 6/16 5/9 5/14   UBE 3'/3' 3'/3' 3'/3'       3'/3'   Open Books  15x3\"            Cervical SNAGs             Crossbody Str             Shoulder AAROM      10x3\" 4# bar        Wall Slides     10x3\" flex/abd 10\"x10 ea        Scap stretch              Median Nerve [place]             No moneys/ horizontal abd             T/S ext, rot             Tband rows, ext 20x ea 20#        20x ea 18# 20x ea 20#   B/l ER 20x ea 7#        20x ea 6# 20x ea 7#   B/l IR 2x0 ea 7#        20x ea 6# 20x ea 7#   D2 Flexion             Pball roll out              Quad Ts, Ys, Ws as able             Prone I, T, Y 20x ea 5#        20x ea 4# 20x ea 5#   Shoulder SHurgs             Scap Squeezes             Coleman Raises         20x 6#    Scap Punches         20x 6#    Scap border stretch              Ther Activity        "      Patient education               Elliptical              Gait Training                                         Modalities 5/16 5/27          [facility] 10' Prone 10' prone 10' prone

## 2025-06-19 ENCOUNTER — OFFICE VISIT (OUTPATIENT)
Dept: PHYSICAL THERAPY | Facility: CLINIC | Age: 40
End: 2025-06-19
Payer: COMMERCIAL

## 2025-06-19 DIAGNOSIS — M54.6 CHRONIC LEFT-SIDED THORACIC BACK PAIN: ICD-10-CM

## 2025-06-19 DIAGNOSIS — M54.2 NECK PAIN, CHRONIC: Primary | ICD-10-CM

## 2025-06-19 DIAGNOSIS — G89.29 CHRONIC LEFT-SIDED THORACIC BACK PAIN: ICD-10-CM

## 2025-06-19 DIAGNOSIS — G89.29 NECK PAIN, CHRONIC: Primary | ICD-10-CM

## 2025-06-19 DIAGNOSIS — H83.2X3 VESTIBULAR HYPOFUNCTION OF BOTH EARS: ICD-10-CM

## 2025-06-19 PROCEDURE — 97140 MANUAL THERAPY 1/> REGIONS: CPT

## 2025-06-19 PROCEDURE — 97112 NEUROMUSCULAR REEDUCATION: CPT

## 2025-06-19 PROCEDURE — 97110 THERAPEUTIC EXERCISES: CPT

## 2025-06-19 NOTE — PROGRESS NOTES
"Daily Note     Today's date: 2025  Patient name: Elvira Grant  : 1985  MRN: [id number]  Referring provider: Enrique Colmenares MD  Dx: No diagnosis found.               Subjective: Pt states he always has pain to left scapula and had radiculopathy down L UE.       Objective: See treatment diary below      Assessment: Tolerated treatment well. Pt had increased muscle tightness through he cervical spine that decreased with STM and cervical traction. Symptoms down LUE also decreased with cervical traction. Pt had increased sensation to R eye during VOR activities and educated on eye fatigue. Completed foam roller with noted improvement in mobility. Patient would benefit from continued PT      Plan: Progress treatment as tolerated.       Precautions:       Date    Visit # 71 72 73 74 75  67 68 69 70   FOTO          XXX   Re-eval                   Focus on NM control of Left scapula and posterior cervical musculature.      Manuals    T/S Mobe PWK j-scoop  PWK PWK PWK     PWK j-scoop   UT/LS Str PWK PWK PWK PWK      PWK   Scapular Mob  PWK PWK PWK PWK        Cervical Mob PWK PWK PWK PWK PWK  CB   PWK   Shoulder AROM     PWK        R Shldr PROM and STM             Cupping              CS gentle traction PWK PWK PWK PWK PWK AAW CB   PWK   IASTM             Median Nerve Flossing             T spine STM PWK PWK PWK PWK PWK  AAW CB   PWK   Cervical Traction PWK SOR Pwk SOR PWK SOR PWK SOR PWK SOR AAW manual  CB   PWK SOR   Vestibular Testing             Neuro Re-Ed    Pt Education 2x30\"         2x30\"   VOR 1 horizontal 2x30\"   2x30\"  2x30\"   60\"x2 metro 120 60\" x2 metrol 120   2x30\"   VOR 1 Vertical 2x30\"  2x30\"  2x30\"  60\"x2   Metro 120  60\"x2 Metro 120   2x30\"   VOR 2 Horizontal 2x30\"   2x30\"  2x30\"   60\"  60\"   2x30\"    VOR 2 Vertical   20x3'  2x30\"  60\"  60\"      Gaze " "stabilization             Y lift              Chin tucks              Prone serratus              Biodex   x maze lvl 1, (83% then 76%) 2' targert lvl 1 (88%)      2x maze lvl 1, (79% then 87%) 2' targert lvl 1 (90%)    SLS   2x30\" EC on foam ea 2x30\" EC on foam ea     2x30\" ea foam EC    Balance Beam Ambulation             Tandem Stance          2x30 ea on foam EC    Wall clocks    10 laps ea GTB         Wall walks     10 laps ea GTB         Deep Neck flex supine             Foam roll protocol   8'  8' 8' 10\"x10 ea  10\"x10 ea  8'    Pain/postural ucation, dx edu, vestibular edu             Ther Ex 5/16 5/23 5/27 6/4 6/16 5/9 5/14   UBE 3'/3' 3'/3' 3'/3'       3'/3'   Open Books  15x3\"            Cervical SNAGs             Crossbody Str             Shoulder AAROM      10x3\" 4# bar        Wall Slides     10x3\" flex/abd 10\"x10 ea  10\"x10      Scap stretch              Median Nerve [place]             No moneys/ horizontal abd             T/S ext, rot             Tband rows, ext 20x ea 20#        20x ea 18# 20x ea 20#   B/l ER 20x ea 7#        20x ea 6# 20x ea 7#   B/l IR 2x0 ea 7#        20x ea 6# 20x ea 7#   D2 Flexion             Pball roll out              Quad Ts, Ys, Ws as able             Prone I, T, Y 20x ea 5#        20x ea 4# 20x ea 5#   Shoulder SHurgs             Scap Squeezes             Coleman Raises         20x 6#    Scap Punches         20x 6#    Scap border stretch              Ther Activity             Patient education               Elliptical              Gait Training                                         Modalities 5/16 5/27          [facility] 10' Prone 10' prone 10' prone                                                                                                   "

## 2025-06-24 ENCOUNTER — OFFICE VISIT (OUTPATIENT)
Dept: PHYSICAL THERAPY | Facility: CLINIC | Age: 40
End: 2025-06-24
Payer: COMMERCIAL

## 2025-06-24 DIAGNOSIS — G89.29 CHRONIC LEFT-SIDED THORACIC BACK PAIN: ICD-10-CM

## 2025-06-24 DIAGNOSIS — G89.29 NECK PAIN, CHRONIC: Primary | ICD-10-CM

## 2025-06-24 DIAGNOSIS — M54.2 NECK PAIN, CHRONIC: Primary | ICD-10-CM

## 2025-06-24 DIAGNOSIS — H83.2X3 VESTIBULAR HYPOFUNCTION OF BOTH EARS: ICD-10-CM

## 2025-06-24 DIAGNOSIS — M54.6 CHRONIC LEFT-SIDED THORACIC BACK PAIN: ICD-10-CM

## 2025-06-24 DIAGNOSIS — S06.0X0D CONCUSSION WITHOUT LOSS OF CONSCIOUSNESS, SUBSEQUENT ENCOUNTER: ICD-10-CM

## 2025-06-24 PROCEDURE — 97112 NEUROMUSCULAR REEDUCATION: CPT

## 2025-06-24 PROCEDURE — 97110 THERAPEUTIC EXERCISES: CPT

## 2025-06-24 NOTE — PROGRESS NOTES
Daily Note     Today's date: 2025  Patient name: Elvira Grant  : 1985  MRN: [id number]  Referring provider: Enrique Colmenares MD  Dx:   Encounter Diagnosis     ICD-10-CM    1. Neck pain, chronic  M54.2     G89.29       2. Chronic left-sided thoracic back pain  M54.6     G89.29       3. Vestibular hypofunction of both ears  H83.2X3       4. Concussion without loss of consciousness, subsequent encounter  S06.0X0D           Start Time: 1135  Stop Time: 1215  Total time in clinic (min): 40 minutes    Subjective: Pt reports that over the last few days he has had increased migraines, twitching of his left thumb, and sharp pain in left pinky.       Objective: See treatment diary below      Assessment: Pt tolerated treatment well. Continued to focus on shoulder, rotator cuff, scapular stabilization and postural musculature strength and endurance during today's session to attempt to build overall tolerance with all ADLs, as well as decreasing overall symptom intensity. Pt showed good overall tolerance to all strengthening exercises, being able to complete all sets and reps with proper form. Pt did have mild symptom exacerbations later in the sets, needing several rest breaks. Once rest breaks were taken, he was able to compelte remaing sets and reps with apporpaite levels of fatigue and minimal flared symptoms. Patient exhibited good technique with therapeutic exercises and would benefit from continued PT      Plan: Continue per plan of care.  Progress treatment as tolerated.       Precautions:       Date      Visit # 71 72 73 74 75 [phone]     FOTO             Re-eval                   Focus on NM control of Left scapula and posterior cervical musculature.      Manuals  6     T/S Mobe PWK j-scoop  PWK PWK PWK        UT/LS Str PWK PWK PWK PWK         Scapular Mob  PWK PWK PWK PWK        Cervical Mob PWK PWK PWK PWK PWK  CB     "  Shoulder AROM     PWK        R Shldr PROM and STM             Cupping              CS gentle traction PWK PWK PWK PWK PWK AAW CB      IASTM             Median Nerve Flossing             T spine STM PWK PWK PWK PWK PWK  AAW CB      Cervical Traction PWK SOR Pwk SOR PWK SOR PWK SOR PWK SOR AAW manual  CB      Vestibular Testing             Neuro Re-Ed 5/16 5/23 5/27 5/30 6/16 6/19 6/24     Pt Education 2x30\"            VOR 1 horizontal 2x30\"   2x30\"  2x30\"   60\"x2 metro 120 60\" x2 metrol 120      VOR 1 Vertical 2x30\"  2x30\"  2x30\"  60\"x2   Metro 120  60\"x2 Metro 120      VOR 2 Horizontal 2x30\"   2x30\"  2x30\"   60\"  60\"      VOR 2 Vertical   20x3'  2x30\"  60\"  60\"      Gaze stabilization             Y lift              Chin tucks              Prone serratus              Biodex   x maze lvl 1, (83% then 76%) 2' targert lvl 1 (88%)     2x maze lvl 1, (79% then 81%) 2' targert lvl 1 (88%)     SLS   2x30\" EC on foam ea 2x30\" EC on foam ea         Balance Beam Ambulation             Tandem Stance              Wall clocks    10 laps ea GTB         Wall walks     10 laps ea GTB         Flys        20x 5#     Deep Neck flex supine             Foam roll protocol   8'  8' 8' 10\"x10 ea  10\"x10 ea      Pain/postural ucation, dx edu, vestibular edu             Ther Ex 5/16 5/23 5/27 6/4 6/16 6/24     UBE 3'/3' 3'/3' 3'/3'     3'/3'     Open Books  15x3\"            Cervical SNAGs             Crossbody Str             Shoulder AAROM      10x3\" 4# bar        Wall Slides     10x3\" flex/abd 10\"x10 ea  10\"x10      Scap stretch              Median Nerve Glide        30x      Ulnar Nerve Glide        30x     No moneys/ horizontal abd             T/S ext, rot             Tband rows, ext 20x ea 20#       20x ea 20#     B/l ER 20x ea 7#       20x ea 7#     B/l IR 2x0 ea 7#       20x ea 7#     D2 Flexion             Pball roll out              Quad Ts, Ys, Ws as able             Prone I, T, Y 20x ea 5#       20x ea 5# on SB     Shoulder " Hallie             Scap Squeezes             Jared Raises             Scap Punches        20x 5#     Scap border stretch              Ther Activity             Patient education               Elliptical              Gait Training                                         Modalities 5/16 5/27          P 10' Prone 10' prone 10' prone

## 2025-06-26 ENCOUNTER — APPOINTMENT (OUTPATIENT)
Dept: PHYSICAL THERAPY | Facility: CLINIC | Age: 40
End: 2025-06-26
Payer: COMMERCIAL

## 2025-06-30 ENCOUNTER — OFFICE VISIT (OUTPATIENT)
Dept: PHYSICAL THERAPY | Facility: CLINIC | Age: 40
End: 2025-06-30
Payer: COMMERCIAL

## 2025-06-30 DIAGNOSIS — M54.2 NECK PAIN, CHRONIC: Primary | ICD-10-CM

## 2025-06-30 DIAGNOSIS — S06.0X0D CONCUSSION WITHOUT LOSS OF CONSCIOUSNESS, SUBSEQUENT ENCOUNTER: ICD-10-CM

## 2025-06-30 DIAGNOSIS — M54.6 CHRONIC LEFT-SIDED THORACIC BACK PAIN: ICD-10-CM

## 2025-06-30 DIAGNOSIS — G89.29 CHRONIC LEFT-SIDED THORACIC BACK PAIN: ICD-10-CM

## 2025-06-30 DIAGNOSIS — G89.29 NECK PAIN, CHRONIC: Primary | ICD-10-CM

## 2025-06-30 DIAGNOSIS — H83.2X3 VESTIBULAR HYPOFUNCTION OF BOTH EARS: ICD-10-CM

## 2025-06-30 PROCEDURE — 97112 NEUROMUSCULAR REEDUCATION: CPT

## 2025-06-30 PROCEDURE — 97110 THERAPEUTIC EXERCISES: CPT

## 2025-06-30 PROCEDURE — 97140 MANUAL THERAPY 1/> REGIONS: CPT

## 2025-06-30 NOTE — PROGRESS NOTES
Daily Note     Today's date: 2025  Patient name: Elvira Grant  : 1985  MRN: [id number]  Referring provider: Enrique Colmenares MD  Dx:   Encounter Diagnosis     ICD-10-CM    1. Neck pain, chronic  M54.2     G89.29       2. Chronic left-sided thoracic back pain  M54.6     G89.29       3. Vestibular hypofunction of both ears  H83.2X3       4. Concussion without loss of consciousness, subsequent encounter  S06.0X0D           Start Time: 835  Stop Time: 920  Total time in clinic (min): 45 minutes    Subjective: Pt reports that he is now currently getting a sharp pain in his shoulder with any flexion over 140 degrees. Pt also noted that his balance overall is still poor, noting that in the shower he needs to constantly hold onto the wall. He noted that other than in the shower, his balance is not as poor.       Objective: See treatment diary below      Assessment: Pt tolerated treatment well. Due to increased overall symptom intensity and decreased shoulder ROM noted, today's session was focused on improving overall shoulder function with stretches and manual interventions. Pt responded well to all stretches and PROM, with improved overall ROM, decreased symptom intensity, and less tension noted in associated musculature. Patient exhibited good technique with therapeutic exercises and would benefit from continued PT      Plan: Continue per plan of care.  Progress treatment as tolerated.       Precautions:       Date  6    Visit # 71 72 73 74 75 [phone]    FOTO             Re-eval                   Focus on NM control of Left scapula and posterior cervical musculature.      Manuals  6    T/S Mobe PWK j-scoop  PWK PWK PWK        UT/LS Str PWK PWK PWK PWK     PKW    Scapular Mob  PWK PWK PWK PWK    PWK    Cervical Mob PWK PWK PWK PWK PWK  CB  PWK    Shoulder AROM     PWK    PWK    R Shldr PROM and STM            "  Cupping              CS gentle traction PWK PWK PWK PWK PWK AAW CB  PWK    IASTM             Median Nerve Flossing             T spine STM PWK PWK PWK PWK PWK  AAW CB  PWK    Cervical Traction PWK SOR Pwk SOR PWK SOR PWK SOR PWK SOR AAW manual  CB  PWK    Vestibular Testing             Neuro Re-Ed 5/16 5/23 5/27 5/30 6/16 6/19 6/24 6/30    Pt Education 2x30\"            VOR 1 horizontal 2x30\"   2x30\"  2x30\"   60\"x2 metro 120 60\" x2 metrol 120      VOR 1 Vertical 2x30\"  2x30\"  2x30\"  60\"x2   Metro 120  60\"x2 Metro 120      VOR 2 Horizontal 2x30\"   2x30\"  2x30\"   60\"  60\"      VOR 2 Vertical   20x3'  2x30\"  60\"  60\"      Gaze stabilization             Y lift              Chin tucks              Prone serratus              Biodex   x maze lvl 1, (83% then 76%) 2' targert lvl 1 (88%)     2x maze lvl 1, (79% then 81%) 2' targert lvl 1 (88%)     SLS   2x30\" EC on foam ea 2x30\" EC on foam ea         Balance Beam Ambulation             Tandem Stance              Wall clocks    10 laps ea GTB         Wall walks     10 laps ea GTB         Finger Ladder         10x3\" flex/abd    Flys        20x 5# 30x 5#    Deep Neck flex supine             Foam roll protocol   8'  8' 8' 10\"x10 ea  10\"x10 ea      Pain/postural ucation, dx edu, vestibular edu             Ther Ex 5/16 5/23 5/27 6/4 6/16 6/24 6/30    UBE 3'/3' 3'/3' 3'/3'     3'/3' 3'/3'    Open Books  15x3\"            Cervical SNAGs             Crossbody Str             Shoulder AAROM      10x3\" 4# bar        Wall Slides     10x3\" flex/abd 10\"x10 ea  10\"x10  10x5\" flex/abd    Scap stretch              Median Nerve Glide        30x      Ulnar Nerve Glide        30x     No moneys/ horizontal abd             T/S ext, rot             Tband rows, ext 20x ea 20#       20x ea 20#     B/l ER 20x ea 7#       20x ea 7#     B/l IR 2x0 ea 7#       20x ea 7#     D2 Flexion             Pball roll out              Quad Ts, Ys, Ws as able             Prone I, T, Y 20x ea 5#       20x ea 5# " on SB 20x ea 5# on SB    Shoulder SHurgs             Scap Squeezes             Coleman Raises         30x 5#    Scap Punches        20x 5# 30x 5#    Scap border stretch              Ther Activity             Patient education               Elliptical              Gait Training                                         Modalities 5/16 5/27          [facility] 10' Prone 10' prone 10' prone

## 2025-07-03 ENCOUNTER — OFFICE VISIT (OUTPATIENT)
Dept: PHYSICAL THERAPY | Facility: CLINIC | Age: 40
End: 2025-07-03
Payer: COMMERCIAL

## 2025-07-03 DIAGNOSIS — H83.2X3 VESTIBULAR HYPOFUNCTION OF BOTH EARS: ICD-10-CM

## 2025-07-03 DIAGNOSIS — G89.29 CHRONIC LEFT-SIDED THORACIC BACK PAIN: ICD-10-CM

## 2025-07-03 DIAGNOSIS — S06.0X0D CONCUSSION WITHOUT LOSS OF CONSCIOUSNESS, SUBSEQUENT ENCOUNTER: ICD-10-CM

## 2025-07-03 DIAGNOSIS — G89.29 NECK PAIN, CHRONIC: Primary | ICD-10-CM

## 2025-07-03 DIAGNOSIS — M54.2 NECK PAIN, CHRONIC: Primary | ICD-10-CM

## 2025-07-03 DIAGNOSIS — M54.6 CHRONIC LEFT-SIDED THORACIC BACK PAIN: ICD-10-CM

## 2025-07-03 PROCEDURE — 97140 MANUAL THERAPY 1/> REGIONS: CPT

## 2025-07-03 PROCEDURE — 97110 THERAPEUTIC EXERCISES: CPT

## 2025-07-03 NOTE — PROGRESS NOTES
Daily Note     Today's date: 7/3/2025  Patient name: Elvira Grant  : 1985  MRN: [id number]  Referring provider: Ramon Schwab DO  Dx:   Encounter Diagnosis     ICD-10-CM    1. Neck pain, chronic  M54.2     G89.29       2. Chronic left-sided thoracic back pain  M54.6     G89.29       3. Vestibular hypofunction of both ears  H83.2X3       4. Concussion without loss of consciousness, subsequent encounter  S06.0X0D           Start Time: 1110  Stop Time: 1150  Total time in clinic (min): 40 minutes    Subjective: Pt reports that overall his neck, thoracic spine, and shoulder is feeling better coming into today's session. He noted that he does have a headache today, but other than that he is feeling good overall.       Objective: See treatment diary below      Assessment: Pt tolerated treatment well. After UBE was performed as warm up, several manual intervention to neck and shoulder were performed to attempt to reduce the overall intensity of headache noted coming into the session. After manual interventions, continued to work on the overall strength nad endurance of all shoulder, rotator cuff, scapular stabilization, and postural musculature by increasing the overall resistance of all exercises. Resistance was increased due to pt having less intense symptoms, as well as him showing improved overall tolerance and performance with exercises in previous sessions. Pt needed several rest breaks due to increased faituge with progressed exercises, but once rest breaks were taken, pt was able to complete remaining sets and reps with appropriate levels of fatigue post session  Patient exhibited good technique with therapeutic exercises and would benefit from continued PT      Plan: Continue per plan of care.  Progress treatment as tolerated.       Precautions:       Date  6/4 6/16 6/19 6/24 6/30 7/3   Visit # 71 72 73 74 75 76 [phone]   FOTO             Re-eval                   Focus on NM  "control of Left scapula and posterior cervical musculature.      Manuals 5/16 5/21 5/27 5/30 6/4 6/16 6/19 6/24 6/30 7/3   T/S Mobe PWK j-scoop  PWK PWK PWK        UT/LS Str PWK PWK PWK PWK     PKW PWK   Scapular Mob  PWK PWK PWK PWK    PWK    Cervical Mob PWK PWK PWK PWK PWK  CB  PWK PWK   Shoulder AROM     PWK    PWK PWK   R Shldr PROM and STM             Cupping              CS gentle traction PWK PWK PWK PWK PWK AAW CB  PWK PWK   IASTM             Median Nerve Flossing             T spine STM PWK PWK PWK PWK PWK  AAW CB  PWK    Cervical Traction PWK SOR Pwk SOR PWK SOR PWK SOR PWK SOR AAW manual  CB  PWK PWK   Vestibular Testing             Neuro Re-Ed 5/16 5/23 5/27 5/30 6/16 6/19 6/24 6/30    Pt Education 2x30\"            VOR 1 horizontal 2x30\"   2x30\"  2x30\"   60\"x2 metro 120 60\" x2 metrol 120      VOR 1 Vertical 2x30\"  2x30\"  2x30\"  60\"x2   Metro 120  60\"x2 Metro 120      VOR 2 Horizontal 2x30\"   2x30\"  2x30\"   60\"  60\"      VOR 2 Vertical   20x3'  2x30\"  60\"  60\"      Gaze stabilization             Y lift              Chin tucks              Prone serratus              Biodex   x maze lvl 1, (83% then 76%) 2' targert lvl 1 (88%)     2x maze lvl 1, (79% then 81%) 2' targert lvl 1 (88%)     SLS   2x30\" EC on foam ea 2x30\" EC on foam ea         Balance Beam Ambulation             Tandem Stance              Wall clocks    10 laps ea GTB         Wall walks     10 laps ea GTB         Finger Ladder         10x3\" flex/abd 10x3\" flex/abd   Flys        20x 5# 30x 5#    Deep Neck flex supine             Foam roll protocol   8'  8' 8' 10\"x10 ea  10\"x10 ea      Pain/postural ucation, dx edu, vestibular edu             Ther Ex 5/16 5/23 5/27  6/4 6/16  6/24 6/30 7/3   UBE 3'/3' 3'/3' 3'/3'     3'/3' 3'/3' 3'/3'   Open Books  15x3\"            Cervical SNAGs             Crossbody Str             Shoulder AAROM      10x3\" 4# bar        Wall Slides     10x3\" flex/abd 10\"x10 ea  10\"x10  10x5\" flex/abd 10x5\" flex/abd   Scap " stretch              Median Nerve Glide        30x      Ulnar Nerve Glide        30x     No moneys/ horizontal abd             T/S ext, rot             Tband rows, ext 20x ea 20#       20x ea 20#  20x ea 22#   Chop and lift          10x ea 15#    B/l ER 20x ea 7#       20x ea 7#  20x ea 8#   B/l IR 2x0 ea 7#       20x ea 7#  20x ea 8#   D2 Flexion             Pball roll out              Quad Ts, Ys, Ws as able             Prone I, T, Y 20x ea 5#       20x ea 5# on SB 20x ea 5# on SB 20x ea 6# on SB   Shoulder SHurgs             Scap Squeezes             Coleman Raises         30x 5#    Scap Punches        20x 5# 30x 5#    Scap border stretch              Ther Activity             Patient education               Elliptical              Gait Training                                         Modalities 5/16 5/27          [facility] 10' Prone 10' prone 10' prone

## 2025-07-08 ENCOUNTER — OFFICE VISIT (OUTPATIENT)
Dept: PHYSICAL THERAPY | Facility: CLINIC | Age: 40
End: 2025-07-08
Payer: COMMERCIAL

## 2025-07-08 DIAGNOSIS — S06.0X0D CONCUSSION WITHOUT LOSS OF CONSCIOUSNESS, SUBSEQUENT ENCOUNTER: ICD-10-CM

## 2025-07-08 DIAGNOSIS — M54.2 NECK PAIN, CHRONIC: Primary | ICD-10-CM

## 2025-07-08 DIAGNOSIS — G89.29 NECK PAIN, CHRONIC: Primary | ICD-10-CM

## 2025-07-08 DIAGNOSIS — M54.6 CHRONIC LEFT-SIDED THORACIC BACK PAIN: ICD-10-CM

## 2025-07-08 DIAGNOSIS — H83.2X3 VESTIBULAR HYPOFUNCTION OF BOTH EARS: ICD-10-CM

## 2025-07-08 DIAGNOSIS — G89.29 CHRONIC LEFT-SIDED THORACIC BACK PAIN: ICD-10-CM

## 2025-07-08 PROCEDURE — 97140 MANUAL THERAPY 1/> REGIONS: CPT

## 2025-07-08 PROCEDURE — 97110 THERAPEUTIC EXERCISES: CPT

## 2025-07-08 NOTE — PROGRESS NOTES
Daily Note     Today's date: 2025  Patient name: Elvira Grant  : 1985  MRN: [id number]  Referring provider: Ramon Schwab DO  Dx:   Encounter Diagnosis     ICD-10-CM    1. Neck pain, chronic  M54.2     G89.29       2. Chronic left-sided thoracic back pain  M54.6     G89.29       3. Vestibular hypofunction of both ears  H83.2X3       4. Concussion without loss of consciousness, subsequent encounter  S06.0X0D           Start Time: 1005  Stop Time: 1045  Total time in clinic (min): 40 minutes    Subjective: Pt reports that he is starting to feel better, noting that his overall symptom intensity is more tolerable than it has been for a long time.  Pt noted that he has felt more stable overall in the shower, being more stable.      Objective: See treatment diary below      Assessment: Pt tolerated treatment well. Progressed zhao raises and scap punches from 5# to 6# to continue to improve overall shoulder and associated musculature. Pt tolerated progressions well, being able to complete all sets and reps with proper form, no rest breaks needed, and no exacerbation of symptoms. Remainder of session was kept at the same intensity due to progressions being made last session. Pt tolerated exercises better today, but was still challenged needed occasional rest breaks due to increased fatigue. Patient exhibited good technique with therapeutic exercises and would benefit from continued PT      Plan: Continue per plan of care.  Progress treatment as tolerated.       Precautions:       Date 7/8    5/30 6/4 6/16 6/19 6/24 6/30 7/3   Visit # 81   74 75 76 77 [phone] 79 80   FOTO             Re-eval                   Focus on NM control of Left scapula and posterior cervical musculature.      Manuals 7/8   5/30 6/4 6/16 6/19 6/24 6/30 7/3   T/S Mobe    PWK PWK        UT/LS Str PWK   PWK     PKW PWK   Scapular Mob    PWK PWK    PWK    Cervical Mob PWK   PWK PWK  CB  PWK PWK   Shoulder AROM PWK    PWK    PWK PWK   R  "Shldr PROM and STM             Cupping              CS gentle traction PWK   PWK PWK AAW CB  PWK PWK   IASTM             Median Nerve Flossing             T spine STM    PWK PWK  AAW CB  PWK    Cervical Traction PWL   PWK SOR PWK SOR AAW manual  CB  PWK PWK   Vestibular Testing             Neuro Re-Ed 7/8 5/30 6/16 6/19 6/24 6/30    Pt Education             VOR 1 horizontal    2x30\"   60\"x2 metro 120 60\" x2 metrol 120      VOR 1 Vertical    2x30\"  60\"x2   Metro 120  60\"x2 Metro 120      VOR 2 Horizontal    2x30\"   60\"  60\"      VOR 2 Vertical    2x30\"  60\"  60\"      Gaze stabilization             Y lift              Chin tucks              Prone serratus              Biodex        2x maze lvl 1, (79% then 81%) 2' targert lvl 1 (88%)     SLS    2x30\" EC on foam ea         Balance Beam Ambulation             Tandem Stance              Wall clocks    10 laps ea GTB         Wall walks     10 laps ea GTB         Finger Ladder         10x3\" flex/abd 10x3\" flex/abd   Flys        20x 5# 30x 5#    Deep Neck flex supine             Foam roll protocol     8' 8' 10\"x10 ea  10\"x10 ea      Pain/postural ucation, dx edu, vestibular edu             Ther Ex 7/8    6/4 6/16  6/24 6/30 7/3   UBE 3'/3'       3'/3' 3'/3' 3'/3'   Open Books             Cervical SNAGs             Crossbody Str             Shoulder AAROM      10x3\" 4# bar        Wall Slides 10x5\" flex/abd    10x3\" flex/abd 10\"x10 ea  10\"x10  10x5\" flex/abd 10x5\" flex/abd   Scap stretch              Median Nerve Glide        30x      Ulnar Nerve Glide        30x     No moneys/ horizontal abd             T/S ext, rot             Tband rows, ext 20x ea 22#       20x ea 20#  20x ea 22#   Chop and lift 10x ea 15#         10x ea 15#    B/l ER 20x ea 8#       20x ea 7#  20x ea 8#   B/l IR 20x ea 8#       20x ea 7#  20x ea 8#   D2 Flexion             Pball roll out              Quad Ts, Ys, Ws as able             Prone I, T, Y 20x ea 6# on SB       20x ea 5# on SB 20x ea 5# on " SB 20x ea 6# on SB   Shoulder SHurgs             Scap Squeezes             Coleman Raises 30x 6#        30x 5#    Scap Punches 30x 6#       20x 5# 30x 5#    Scap border stretch              Ther Activity             Patient education               Elliptical              Gait Training                                         Modalities             P

## 2025-07-11 ENCOUNTER — OFFICE VISIT (OUTPATIENT)
Dept: PHYSICAL THERAPY | Facility: CLINIC | Age: 40
End: 2025-07-11
Payer: COMMERCIAL

## 2025-07-11 DIAGNOSIS — G89.29 CHRONIC LEFT-SIDED THORACIC BACK PAIN: ICD-10-CM

## 2025-07-11 DIAGNOSIS — M54.6 CHRONIC LEFT-SIDED THORACIC BACK PAIN: ICD-10-CM

## 2025-07-11 DIAGNOSIS — M54.2 NECK PAIN, CHRONIC: Primary | ICD-10-CM

## 2025-07-11 DIAGNOSIS — H83.2X3 VESTIBULAR HYPOFUNCTION OF BOTH EARS: ICD-10-CM

## 2025-07-11 DIAGNOSIS — G89.29 NECK PAIN, CHRONIC: Primary | ICD-10-CM

## 2025-07-11 DIAGNOSIS — S06.0X0D CONCUSSION WITHOUT LOSS OF CONSCIOUSNESS, SUBSEQUENT ENCOUNTER: ICD-10-CM

## 2025-07-11 PROCEDURE — 97140 MANUAL THERAPY 1/> REGIONS: CPT

## 2025-07-11 PROCEDURE — 97110 THERAPEUTIC EXERCISES: CPT

## 2025-07-11 NOTE — PROGRESS NOTES
Daily Note     Today's date: 2025  Patient name: Elvira Grant  : 1985  MRN: [id number]  Referring provider: Ramon Schwab DO  Dx:   Encounter Diagnosis     ICD-10-CM    1. Neck pain, chronic  M54.2     G89.29       2. Vestibular hypofunction of both ears  H83.2X3       3. Chronic left-sided thoracic back pain  M54.6     G89.29       4. Concussion without loss of consciousness, subsequent encounter  S06.0X0D           Start Time: 1000  Stop Time: 1100  Total time in clinic (min): 60 minutes    Subjective: Pt reports that overall he is feeling better coming into today's session, with no complaints overall.       Objective: See treatment diary below      Assessment: Pt tolerated treatment well. Pt reported that after UBE during today's session he felt increased pain and discomfort in his left scapula region, so manual interventions were performed to attempt to reduce overall noted increased tension, which pt responded well to with decreased symptom intensity post manuals. Added and progressed several exercises during today's session due to pt showing improved tolerance in previous session, as well as overall decreased symptoms coming into today's session and throughout resumption of strengthening program. Added ER curl and offering raises to improve the overall strength and endurance of all shoulder, rotator cuff, scapular stabilization, and postural musculature. Progressed chop and lifts from 15# to 17#. Progressed rows/extensions from 22# to 23#. Patient exhibited good technique with therapeutic exercises and would benefit from continued PT      Plan: Continue per plan of care.  Progress treatment as tolerated.       Precautions:       Date 7/8  7/11  5/30 6/4 6/16 6/19 6/24 6/30 7/3   Visit # 81 82  74 75 76 77 78 79 80   FOTO             Re-eval                   Focus on NM control of Left scapula and posterior cervical musculature.      Manuals 7/8 7/11  5/30 6/4 6/16 6/19 6/24 6/30 7/3   T/S  "Mobe    PWK PWK        UT/LS Str PWK PWK  PWK     PKW PWK   Scapular Mob  PWK  PWK PWK    PWK    Cervical Mob PWK PWK  PWK PWK  CB  PWK PWK   Shoulder AROM PWK PWK   PWK    PWK PWK   R Shldr PROM and STM             Cupping              CS gentle traction PWK PWK  PWK PWK AAW CB  PWK PWK   IASTM             Median Nerve Flossing             T spine STM    PWK PWK  AAW CB  PWK    Cervical Traction PWL PWK  PWK SOR PWK SOR AAW manual  CB  PWK PWK   Vestibular Testing             Neuro Re-Ed 7/8 7/11 5/30 6/16 6/19 6/24 6/30    Pt Education             VOR 1 horizontal    2x30\"   60\"x2 metro 120 60\" x2 metrol 120      VOR 1 Vertical    2x30\"  60\"x2   Metro 120  60\"x2 Metro 120      VOR 2 Horizontal    2x30\"   60\"  60\"      VOR 2 Vertical    2x30\"  60\"  60\"      Gaze stabilization             Y lift              Chin tucks              Prone serratus              Biodex        2x maze lvl 1, (79% then 81%) 2' targert lvl 1 (88%)     SLS    2x30\" EC on foam ea         Balance Beam Ambulation             Tandem Stance              Wall clocks    10 laps ea GTB         Wall walks     10 laps ea GTB         Finger Ladder         10x3\" flex/abd 10x3\" flex/abd   Flys        20x 5# 30x 5#    Deep Neck flex supine             Foam roll protocol     8' 8' 10\"x10 ea  10\"x10 ea      Pain/postural ucation, dx edu, vestibular edu             Ther Ex 7/8 7/11   6/4 6/16  6/24 6/30 7/3   UBE 3'/3' 3'/3'      3'/3' 3'/3' 3'/3'   Open Books             Cervical SNAGs             Crossbody Str             Shoulder AAROM      10x3\" 4# bar        Wall Slides 10x5\" flex/abd 10x5\" flex/abd   10x3\" flex/abd 10\"x10 ea  10\"x10  10x5\" flex/abd 10x5\" flex/abd   Scap stretch              Median Nerve Glide        30x      Ulnar Nerve Glide        30x     No moneys/ horizontal abd             T/S ext, rot             Tband rows, ext 20x ea 22# 20x ea 23#      20x ea 20#  20x ea 22#   Chop and lift 10x ea 15# 10x ea 17#        10x ea 15#    B/l ER " 20x ea 8#       20x ea 7#  20x ea 8#   B/l IR 20x ea 8#       20x ea 7#  20x ea 8#   ER Curl  20x GTB           Offering Raises  30x GTB           D2 Flexion             Pball roll out              Quad Ts, Ys, Ws as able             Prone I, T, Y 20x ea 6# on SB 20x ea 6# on SB      20x ea 5# on SB 20x ea 5# on SB 20x ea 6# on SB   Shoulder SHurgs             Scap Squeezes             Coleman Raises 30x 6# 30x 6#       30x 5#    Scap Punches 30x 6# 30x 6#      20x 5# 30x 5#    Scap border stretch              Ther Activity             Patient education               Elliptical              Gait Training                                         Modalities             P

## 2025-07-15 ENCOUNTER — OFFICE VISIT (OUTPATIENT)
Dept: PHYSICAL THERAPY | Facility: CLINIC | Age: 40
End: 2025-07-15
Payer: COMMERCIAL

## 2025-07-15 DIAGNOSIS — S06.0X0D CONCUSSION WITHOUT LOSS OF CONSCIOUSNESS, SUBSEQUENT ENCOUNTER: ICD-10-CM

## 2025-07-15 DIAGNOSIS — G89.29 NECK PAIN, CHRONIC: Primary | ICD-10-CM

## 2025-07-15 DIAGNOSIS — M54.6 CHRONIC LEFT-SIDED THORACIC BACK PAIN: ICD-10-CM

## 2025-07-15 DIAGNOSIS — M54.2 NECK PAIN, CHRONIC: Primary | ICD-10-CM

## 2025-07-15 DIAGNOSIS — G89.29 CHRONIC LEFT-SIDED THORACIC BACK PAIN: ICD-10-CM

## 2025-07-15 PROCEDURE — 97110 THERAPEUTIC EXERCISES: CPT

## 2025-07-15 PROCEDURE — 97140 MANUAL THERAPY 1/> REGIONS: CPT

## 2025-07-15 PROCEDURE — 97112 NEUROMUSCULAR REEDUCATION: CPT

## 2025-07-17 ENCOUNTER — OFFICE VISIT (OUTPATIENT)
Dept: PHYSICAL THERAPY | Facility: CLINIC | Age: 40
End: 2025-07-17
Payer: COMMERCIAL

## 2025-07-17 DIAGNOSIS — G89.29 NECK PAIN, CHRONIC: Primary | ICD-10-CM

## 2025-07-17 DIAGNOSIS — S06.0X0D CONCUSSION WITHOUT LOSS OF CONSCIOUSNESS, SUBSEQUENT ENCOUNTER: ICD-10-CM

## 2025-07-17 DIAGNOSIS — M54.2 NECK PAIN, CHRONIC: Primary | ICD-10-CM

## 2025-07-17 DIAGNOSIS — G89.29 CHRONIC LEFT-SIDED THORACIC BACK PAIN: ICD-10-CM

## 2025-07-17 DIAGNOSIS — M54.6 CHRONIC LEFT-SIDED THORACIC BACK PAIN: ICD-10-CM

## 2025-07-17 PROCEDURE — 97112 NEUROMUSCULAR REEDUCATION: CPT

## 2025-07-17 PROCEDURE — 97140 MANUAL THERAPY 1/> REGIONS: CPT

## 2025-07-17 PROCEDURE — 97110 THERAPEUTIC EXERCISES: CPT

## 2025-07-17 NOTE — PROGRESS NOTES
Daily Note     Today's date: 2025  Patient name: Elvira Grant  : 1985  MRN: [id number]  Referring provider: Ramon Schwab DO  Dx:   Encounter Diagnosis     ICD-10-CM    1. Neck pain, chronic  M54.2     G89.29       2. Chronic left-sided thoracic back pain  M54.6     G89.29       3. Concussion without loss of consciousness, subsequent encounter  S06.0X0D         Start Time: 1110  Stop Time: 1140  Total time in clinic (min): 30 minutes    Subjective: Arrived 10 mins late to session. Pt reports soreness in neck and LUE, no major changes in symptom irritability.        Objective: See treatment diary below      Assessment: Tolerated treatment well. Pt reported he gets the most relief w/ cervical distraction, but has minimal centralization of LUE radicular symptoms. Completed cervical distraction w/ retraction and extension, helped to centralize symptoms in LUE. Pt reported that he felt better following manual therapy and was able to complete therapeutic exercises w/o exacerbation of symptoms. Patient demonstrated fatigue post treatment, exhibited good technique with therapeutic exercises, and would benefit from continued PT for symptom reduction and maximal function.       Plan: Continue per plan of care.  Progress treatment as tolerated.       Precautions:       Date 7/8  7/11 7/15  6/4 6/16 6/19 6/24 6/30 7/3   Visit # 81 82 83   75 76 77 78 79 80   FOTO             Re-eval                   Focus on NM control of Left scapula and posterior cervical musculature.      Manuals 7/8 7/11 7/15  7/17     6/30 7/3   T/S Mobe             UT/LS Str PWK PWK MH       PKW PWK   Scapular Mob  PWK       PWK    Cervical Mob PWK PWK MH  JMK G2-3     PWK PWK   Shoulder AROM PWK PWK MH       PWK PWK   R Shldr PROM and STM             Cupping              CS gentle traction PWK PWK MH       PWK PWK   IASTM             Median Nerve Flossing             T spine STM         PWK    Cervical Traction PWL PWK  man JMK      " PWK PWK   Cervical distraction, retraction, extension    JMK         Vestibular Testing             Neuro Re-Ed 7/8 7/11 7/15       6/30    Pt Education             VOR 1 horizontal             VOR 1 Vertical             VOR 2 Horizontal             VOR 2 Vertical             Gaze stabilization             Y lift              Chin tucks              Prone serratus              FGA              Amb with head turns              Biodex             SLS             Balance Beam Ambulation             Tandem Stance              Wall clocks             Wall walks              Finger Ladder         10x3\" flex/abd 10x3\" flex/abd   Flys         30x 5#    Deep Neck flex supine             Foam roll protocol                           Prone Y,T,I    20x ea 6# on SB for scap motor control 30x ea 6# on SB for scap motor control                      Pain/postural ucation, dx edu, vestibular edu             Ther Ex 7/8 7/11 7/15       6/30 7/3   UBE 3'/3' 3'/3' 3'/3'  3'/3'     3'/3' 3'/3'   Open Books             Cervical SNAGs             Crossbody Str             Shoulder AAROM              Wall Slides 10x5\" flex/abd 10x5\" flex/abd 10x5\" flex/abd       10x5\" flex/abd 10x5\" flex/abd   Scap stretch              Median Nerve Glide             Ulnar Nerve [place]             No moneys/ horizontal abd             T/S ext, rot             Tband rows, ext 20x ea 22# 20x ea 23# 20x ea 23#  3x10 ea 25#      20x ea 22#   Chop and lift 10x ea 15# 10x ea 17# 10x ea 17#       10x ea 15#    B/l ER 20x ea 8#         20x ea 8#   B/l IR 20x ea 8#         20x ea 8#   ER Curl  20x GTB           Offering Raises  30x GTB           D2 Flexion             Pball roll out              Quad Ts, Ys, Ws as able             Prone I, T, Y 20x ea 6# on SB 20x ea 6# on SB       20x ea 5# on SB 20x ea 6# on SB   Shoulder SHurgs             Scap Squeezes             Coleman Raises 30x 6# 30x 6# 30x 6#      30x 5#    Scap Punches 30x 6# 30x 6# 30x 6#       30x 5#  "   Scap border stretch              Ther Activity             Patient education               Elliptical              Gait Training                                         Modalities             P

## 2025-07-21 ENCOUNTER — OFFICE VISIT (OUTPATIENT)
Dept: PHYSICAL THERAPY | Facility: CLINIC | Age: 40
End: 2025-07-21
Payer: COMMERCIAL

## 2025-07-21 DIAGNOSIS — M54.6 CHRONIC LEFT-SIDED THORACIC BACK PAIN: ICD-10-CM

## 2025-07-21 DIAGNOSIS — G89.29 NECK PAIN, CHRONIC: Primary | ICD-10-CM

## 2025-07-21 DIAGNOSIS — S06.0X0D CONCUSSION WITHOUT LOSS OF CONSCIOUSNESS, SUBSEQUENT ENCOUNTER: ICD-10-CM

## 2025-07-21 DIAGNOSIS — G89.29 CHRONIC LEFT-SIDED THORACIC BACK PAIN: ICD-10-CM

## 2025-07-21 DIAGNOSIS — H83.2X3 VESTIBULAR HYPOFUNCTION OF BOTH EARS: ICD-10-CM

## 2025-07-21 DIAGNOSIS — M54.2 NECK PAIN, CHRONIC: Primary | ICD-10-CM

## 2025-07-21 PROCEDURE — 97110 THERAPEUTIC EXERCISES: CPT

## 2025-07-21 PROCEDURE — 97112 NEUROMUSCULAR REEDUCATION: CPT

## 2025-07-21 NOTE — PROGRESS NOTES
Daily Note     Today's date: 2025  Patient name: Elvira Grant  : 1985  MRN: [id number]  Referring provider: Ramon Schwab DO  Dx:   Encounter Diagnosis     ICD-10-CM    1. Neck pain, chronic  M54.2     G89.29       2. Chronic left-sided thoracic back pain  M54.6     G89.29       3. Concussion without loss of consciousness, subsequent encounter  S06.0X0D       4. Vestibular hypofunction of both ears  H83.2X3           Start Time: 0840  Stop Time: 925  Total time in clinic (min): 45 minutes    Subjective: Pt reports that overall he is tired for just waking up 10 minutes prior to PT session, but noted he is currently not having any discomfort.       Objective: See treatment diary below      Assessment: Pt tolerated treatment well. Continued to progress both resistance and duration of exercises during today PT session in order to improve the strength, endurance,and tolerance to activities  globally throughout targeted muscle groups. Pt was challenged with several of the progressions made during the session, but was able to maintain proper form and have no exacerbation of symptoms throughout all sets and reps. Continue to progress pt in order to move forward towards reaching previously set goals. Patient exhibited good technique with therapeutic exercises and would benefit from continued PT      Plan: Continue per plan of care.  Progress treatment as tolerated.       Precautions:       Date 7/8  7/11 7/15 7/17 7/21   6/24 6/30 7/3   Visit # 81 82 83  84 85   78 79 80   FOTO             Re-eval                   Focus on NM control of Left scapula and posterior cervical musculature.      Manuals 7/8 7/11 7/15  7/17 7/21    6/30 7/3   T/S Mobe             UT/LS Str PWK PWK MH       PKW PWK   Scapular Mob  PWK       PWK    Cervical Mob PWK PWK MH  JMK G2-3     PWK PWK   Shoulder AROM PWK PWK MH       PWK PWK   R Shldr PROM and STM             Cupping              CS gentle traction PWK PWK MH       PWK  "PWK   IASTM             Median Nerve Flossing             T spine STM         PWK    Cervical Traction PWL PWK MH man JMK      PWK PWK   Cervical distraction, retraction, extension    JMK         Vestibular Testing             Neuro Re-Ed 7/8 7/11 7/15   7/21    6/30    Pt Education             VOR 1 horizontal             VOR 1 Vertical             VOR 2 Horizontal             VOR 2 Vertical             Gaze stabilization             Y lift              Chin tucks              Prone serratus              FGA              Amb with head turns              Biodex             SLS             Balance Beam Ambulation             Tandem Stance              Wall clocks     15x BTB        Wall walks      15x BTB        Finger Ladder         10x3\" flex/abd 10x3\" flex/abd   Flys     30x 7#    30x 5#    Deep Neck flex supine             Foam roll protocol                           Prone Y,T,I    20x ea 6# on SB for scap motor control 30x ea 6# on SB for scap motor control                      Pain/postural ucation, dx edu, vestibular edu             Ther Ex 7/8 7/11 7/15   7/21    6/30 7/3   UBE 3'/3' 3'/3' 3'/3'  3'/3' 3'/3'    3'/3' 3'/3'   Open Books             Cervical SNAGs             Crossbody Str             Shoulder AAROM              Wall Slides 10x5\" flex/abd 10x5\" flex/abd 10x5\" flex/abd       10x5\" flex/abd 10x5\" flex/abd   Scap stretch              Median Nerve Glide             Ulnar Nerve [place]             No moneys/ horizontal abd             T/S ext, rot             Tband rows, ext 20x ea 22# 20x ea 23# 20x ea 23#  3x10 ea 25# 3x10 ea 25#     20x ea 22#   Chop and lift 10x ea 15# 10x ea 17# 10x ea 17#  15x ea 18#     10x ea 15#    B/l ER 20x ea 8#         20x ea 8#   B/l IR 20x ea 8#         20x ea 8#   ER Curl  20x GTB   20x BTB        Offering Raises  30x GTB   20x BTB        D2 Flexion             Pball roll out              Quad Ts, Ys, Ws as able             Prone I, T, Y 20x ea 6# on SB 20x ea 6# on SB "   20x ea 7# on SB    20x ea 5# on SB 20x ea 6# on SB   Shoulder SHurgs             Scap Squeezes             Coleman Raises 30x 6# 30x 6# 30x 6#  30x 7#    30x 5#    Scap Punches 30x 6# 30x 6# 30x 6#   30x 7#    30x 5#    Scap border stretch              Ther Activity     7/21        Patient education               Elliptical              Gait Training     7/21                                    Modalities     7/21        [facility]

## 2025-07-24 ENCOUNTER — OFFICE VISIT (OUTPATIENT)
Dept: PHYSICAL THERAPY | Facility: CLINIC | Age: 40
End: 2025-07-24
Payer: COMMERCIAL

## 2025-07-24 DIAGNOSIS — M54.2 NECK PAIN, CHRONIC: Primary | ICD-10-CM

## 2025-07-24 DIAGNOSIS — M54.6 CHRONIC LEFT-SIDED THORACIC BACK PAIN: ICD-10-CM

## 2025-07-24 DIAGNOSIS — S06.0X0D CONCUSSION WITHOUT LOSS OF CONSCIOUSNESS, SUBSEQUENT ENCOUNTER: ICD-10-CM

## 2025-07-24 DIAGNOSIS — G89.29 CHRONIC LEFT-SIDED THORACIC BACK PAIN: ICD-10-CM

## 2025-07-24 DIAGNOSIS — H83.2X3 VESTIBULAR HYPOFUNCTION OF BOTH EARS: ICD-10-CM

## 2025-07-24 DIAGNOSIS — G89.29 NECK PAIN, CHRONIC: Primary | ICD-10-CM

## 2025-07-24 PROCEDURE — 97112 NEUROMUSCULAR REEDUCATION: CPT

## 2025-07-24 PROCEDURE — 97110 THERAPEUTIC EXERCISES: CPT

## 2025-07-24 NOTE — PROGRESS NOTES
Daily Note     Today's date: 2025  Patient name: Elvira Grant  : 1985  MRN: [id number]  Referring provider: Ramon Schwab DO  Dx:   Encounter Diagnosis     ICD-10-CM    1. Neck pain, chronic  M54.2     G89.29       2. Chronic left-sided thoracic back pain  M54.6     G89.29       3. Concussion without loss of consciousness, subsequent encounter  S06.0X0D       4. Vestibular hypofunction of both ears  H83.2X3           Start Time: 1005  Stop Time: 1045  Total time in clinic (min): 40 minutes    Subjective: Pt reports that overall he is feeling good overall, noting minimal pain or discomfort over the last few days. Pt noted that his balance has been shaky the last few days after him noting overall improvements for the last few weeks.       Objective: See treatment diary below      Assessment: Pt tolerated treatment well. Focused today's session primarily on balance exercises due to pt noting decreased overall performance with balance during ADLs. Added walking with head turns, both vertically and horizontally. Pt had good performance with vertical head turn, with only unbalance seen with turning. Pt was more challenged with walking with horizontal head turns, with pts veering left along the path. Pt was able to show improved control with horizontal balance with arms out to the side. During SLS pt had good control with the left leg not losing balance during the 30 sec either rep, but with his right leg he needed UE assistance on bar around every 7 seconds due to loss of balance. PT had the same trend of good balance on left leg  and difficulty with right leg in both SLS with ball toss and tandem stance. Patient exhibited good technique with therapeutic exercises and would benefit from continued PT      Plan: Continue per plan of care.  Progress treatment as tolerated.       Precautions:       Date 7/8  7/11 7/15 7/17 7/21 7/24  6/24 6/30 7/3   Visit # 81 82 83  84 85 86  78 79 80   DILLON            "  Re-eval                   Focus on NM control of Left scapula and posterior cervical musculature.      Manuals 7/8 7/11 7/15  7/17 7/21 7/24   6/30 7/3   T/S Mobe             UT/LS Str PWK PWK MH       PKW PWK   Scapular Mob  PWK       PWK    Cervical Mob PWK PWK MH  JMK G2-3     PWK PWK   Shoulder AROM PWK PWK MH       PWK PWK   R Shldr PROM and STM             Cupping              CS gentle traction PWK PWK MH       PWK PWK   IASTM             Median Nerve Flossing             T spine STM         PWK    Cervical Traction PWL PWK MH man JMK      PWK PWK   Cervical distraction, retraction, extension    JMK         Vestibular Testing             Neuro Re-Ed 7/8 7/11 7/15   7/21 7/24   6/30    Pt Education             VOR 1 horizontal             VOR 1 Vertical             VOR 2 Horizontal             VOR 2 Vertical             Gaze stabilization             Y lift              Chin tucks              Prone serratus              FGA              Amb with head turns       5 laps horizontal head turn, 5 laps vertical head turns       Biodex      2x maze hard lvl 3 floor (trial 1:80% 46 sec) (trails 2: 87% 46 sec) RCT 2' 86%       SLS      2x 30\" ea EC on foam       Balance Beam Ambulation             Tandem Stance       2x30\" ea on foam w/ eyes cloes       SLS ball toss      30x rosses each leg on        Wall clocks     15x BTB        Wall walks      15x BTB        Finger Ladder         10x3\" flex/abd 10x3\" flex/abd   Flys     30x 7#    30x 5#    Deep Neck flex supine             Foam roll protocol                           Prone Y,T,I    20x ea 6# on SB for scap motor control 30x ea 6# on SB for scap motor control                      Pain/postural ucation, dx edu, vestibular edu             Ther Ex 7/8 7/11 7/15   7/21 7/26   6/30 7/3   UBE 3'/3' 3'/3' 3'/3'  3'/3' 3'/3' 3'/3'   3'/3' 3'/3'   Open Books             Cervical SNAGs             Crossbody Str             Shoulder AAROM              Wall Slides 10x5\" " "flex/abd 10x5\" flex/abd 10x5\" flex/abd       10x5\" flex/abd 10x5\" flex/abd   Scap stretch              Median Nerve Glide             Ulnar Nerve [place]             No moneys/ horizontal abd             T/S ext, rot             Tband rows, ext 20x ea 22# 20x ea 23# 20x ea 23#  3x10 ea 25# 3x10 ea 25# 3x10 ea 25#    20x ea 22#   Chop and lift 10x ea 15# 10x ea 17# 10x ea 17#  15x ea 18#     10x ea 15#    B/l ER 20x ea 8#         20x ea 8#   B/l IR 20x ea 8#         20x ea 8#   ER Curl  20x GTB   20x BTB        Offering Raises  30x GTB   20x BTB        D2 Flexion             Pball roll out              Quad Ts, Ys, Ws as able             Prone I, T, Y 20x ea 6# on SB 20x ea 6# on SB   20x ea 7# on SB    20x ea 5# on SB 20x ea 6# on SB   Shoulder SHurgs             Scap Squeezes             Coleman Raises 30x 6# 30x 6# 30x 6#  30x 7#    30x 5#    Scap Punches 30x 6# 30x 6# 30x 6#   30x 7#    30x 5#    Scap border stretch              Ther Activity     7/21 7/26       Patient education               Elliptical              Gait Training     7/21                                    Modalities     7/21        [facility]                                                                                                                      "

## 2025-07-29 ENCOUNTER — OFFICE VISIT (OUTPATIENT)
Dept: PHYSICAL THERAPY | Facility: CLINIC | Age: 40
End: 2025-07-29
Payer: COMMERCIAL

## 2025-07-29 DIAGNOSIS — H83.2X3 VESTIBULAR HYPOFUNCTION OF BOTH EARS: ICD-10-CM

## 2025-07-29 DIAGNOSIS — S06.0X0D CONCUSSION WITHOUT LOSS OF CONSCIOUSNESS, SUBSEQUENT ENCOUNTER: ICD-10-CM

## 2025-07-29 DIAGNOSIS — M54.2 NECK PAIN, CHRONIC: Primary | ICD-10-CM

## 2025-07-29 DIAGNOSIS — G89.29 CHRONIC LEFT-SIDED THORACIC BACK PAIN: ICD-10-CM

## 2025-07-29 DIAGNOSIS — M54.6 CHRONIC LEFT-SIDED THORACIC BACK PAIN: ICD-10-CM

## 2025-07-29 DIAGNOSIS — G89.29 NECK PAIN, CHRONIC: Primary | ICD-10-CM

## 2025-07-29 PROCEDURE — 97140 MANUAL THERAPY 1/> REGIONS: CPT

## 2025-07-29 PROCEDURE — 97110 THERAPEUTIC EXERCISES: CPT

## 2025-07-30 ENCOUNTER — OFFICE VISIT (OUTPATIENT)
Dept: PHYSICAL THERAPY | Facility: CLINIC | Age: 40
End: 2025-07-30
Payer: COMMERCIAL

## 2025-07-30 DIAGNOSIS — G89.29 CHRONIC LEFT-SIDED THORACIC BACK PAIN: ICD-10-CM

## 2025-07-30 DIAGNOSIS — H83.2X3 VESTIBULAR HYPOFUNCTION OF BOTH EARS: ICD-10-CM

## 2025-07-30 DIAGNOSIS — M54.6 CHRONIC LEFT-SIDED THORACIC BACK PAIN: ICD-10-CM

## 2025-07-30 DIAGNOSIS — M54.2 NECK PAIN, CHRONIC: Primary | ICD-10-CM

## 2025-07-30 DIAGNOSIS — S06.0X0D CONCUSSION WITHOUT LOSS OF CONSCIOUSNESS, SUBSEQUENT ENCOUNTER: ICD-10-CM

## 2025-07-30 DIAGNOSIS — G89.29 NECK PAIN, CHRONIC: Primary | ICD-10-CM

## 2025-07-30 PROCEDURE — 97110 THERAPEUTIC EXERCISES: CPT

## 2025-07-30 PROCEDURE — 97112 NEUROMUSCULAR REEDUCATION: CPT

## 2025-08-01 ENCOUNTER — OFFICE VISIT (OUTPATIENT)
Dept: PHYSICAL THERAPY | Facility: CLINIC | Age: 40
End: 2025-08-01
Payer: COMMERCIAL

## 2025-08-01 DIAGNOSIS — M54.2 NECK PAIN, CHRONIC: Primary | ICD-10-CM

## 2025-08-01 DIAGNOSIS — H83.2X3 VESTIBULAR HYPOFUNCTION OF BOTH EARS: ICD-10-CM

## 2025-08-01 DIAGNOSIS — S06.0X0D CONCUSSION WITHOUT LOSS OF CONSCIOUSNESS, SUBSEQUENT ENCOUNTER: ICD-10-CM

## 2025-08-01 DIAGNOSIS — G89.29 CHRONIC LEFT-SIDED THORACIC BACK PAIN: ICD-10-CM

## 2025-08-01 DIAGNOSIS — G89.29 NECK PAIN, CHRONIC: Primary | ICD-10-CM

## 2025-08-01 DIAGNOSIS — M54.6 CHRONIC LEFT-SIDED THORACIC BACK PAIN: ICD-10-CM

## 2025-08-01 PROCEDURE — 97112 NEUROMUSCULAR REEDUCATION: CPT

## 2025-08-01 PROCEDURE — 97110 THERAPEUTIC EXERCISES: CPT

## 2025-08-05 ENCOUNTER — OFFICE VISIT (OUTPATIENT)
Dept: PHYSICAL THERAPY | Facility: CLINIC | Age: 40
End: 2025-08-05
Payer: COMMERCIAL

## 2025-08-05 DIAGNOSIS — M54.6 CHRONIC LEFT-SIDED THORACIC BACK PAIN: ICD-10-CM

## 2025-08-05 DIAGNOSIS — H83.2X3 VESTIBULAR HYPOFUNCTION OF BOTH EARS: ICD-10-CM

## 2025-08-05 DIAGNOSIS — G89.29 NECK PAIN, CHRONIC: Primary | ICD-10-CM

## 2025-08-05 DIAGNOSIS — M54.2 NECK PAIN, CHRONIC: Primary | ICD-10-CM

## 2025-08-05 DIAGNOSIS — G89.29 CHRONIC LEFT-SIDED THORACIC BACK PAIN: ICD-10-CM

## 2025-08-05 DIAGNOSIS — S06.0X0D CONCUSSION WITHOUT LOSS OF CONSCIOUSNESS, SUBSEQUENT ENCOUNTER: ICD-10-CM

## 2025-08-05 PROCEDURE — 97112 NEUROMUSCULAR REEDUCATION: CPT

## 2025-08-07 ENCOUNTER — OFFICE VISIT (OUTPATIENT)
Dept: PHYSICAL THERAPY | Facility: CLINIC | Age: 40
End: 2025-08-07
Payer: COMMERCIAL

## 2025-08-07 DIAGNOSIS — M54.2 NECK PAIN, CHRONIC: Primary | ICD-10-CM

## 2025-08-07 DIAGNOSIS — G89.29 NECK PAIN, CHRONIC: Primary | ICD-10-CM

## 2025-08-07 DIAGNOSIS — G89.29 CHRONIC LEFT-SIDED THORACIC BACK PAIN: ICD-10-CM

## 2025-08-07 DIAGNOSIS — S06.0X0D CONCUSSION WITHOUT LOSS OF CONSCIOUSNESS, SUBSEQUENT ENCOUNTER: ICD-10-CM

## 2025-08-07 DIAGNOSIS — H83.2X3 VESTIBULAR HYPOFUNCTION OF BOTH EARS: ICD-10-CM

## 2025-08-07 DIAGNOSIS — M54.6 CHRONIC LEFT-SIDED THORACIC BACK PAIN: ICD-10-CM

## 2025-08-07 PROCEDURE — 97110 THERAPEUTIC EXERCISES: CPT

## 2025-08-07 PROCEDURE — 97112 NEUROMUSCULAR REEDUCATION: CPT

## 2025-08-11 ENCOUNTER — OFFICE VISIT (OUTPATIENT)
Dept: PHYSICAL THERAPY | Facility: CLINIC | Age: 40
End: 2025-08-11
Payer: COMMERCIAL

## 2025-08-13 ENCOUNTER — OFFICE VISIT (OUTPATIENT)
Dept: PHYSICAL THERAPY | Facility: CLINIC | Age: 40
End: 2025-08-13
Payer: COMMERCIAL

## 2025-08-15 ENCOUNTER — EVALUATION (OUTPATIENT)
Dept: PHYSICAL THERAPY | Facility: CLINIC | Age: 40
End: 2025-08-15
Payer: COMMERCIAL

## 2025-08-18 ENCOUNTER — OFFICE VISIT (OUTPATIENT)
Dept: PHYSICAL THERAPY | Facility: CLINIC | Age: 40
End: 2025-08-18
Payer: COMMERCIAL

## 2025-08-18 DIAGNOSIS — M54.2 NECK PAIN, CHRONIC: ICD-10-CM

## 2025-08-18 DIAGNOSIS — G89.29 NECK PAIN, CHRONIC: ICD-10-CM

## 2025-08-18 DIAGNOSIS — M54.6 CHRONIC LEFT-SIDED THORACIC BACK PAIN: Primary | ICD-10-CM

## 2025-08-18 DIAGNOSIS — H83.2X3 VESTIBULAR HYPOFUNCTION OF BOTH EARS: ICD-10-CM

## 2025-08-18 DIAGNOSIS — G89.29 CHRONIC LEFT-SIDED THORACIC BACK PAIN: Primary | ICD-10-CM

## 2025-08-18 DIAGNOSIS — S06.0X0D CONCUSSION WITHOUT LOSS OF CONSCIOUSNESS, SUBSEQUENT ENCOUNTER: ICD-10-CM

## 2025-08-18 PROCEDURE — 97110 THERAPEUTIC EXERCISES: CPT | Performed by: PHYSICAL THERAPY ASSISTANT

## 2025-08-18 PROCEDURE — 97112 NEUROMUSCULAR REEDUCATION: CPT | Performed by: PHYSICAL THERAPY ASSISTANT

## 2025-08-20 ENCOUNTER — OFFICE VISIT (OUTPATIENT)
Dept: PHYSICAL THERAPY | Facility: CLINIC | Age: 40
End: 2025-08-20
Payer: COMMERCIAL

## 2025-08-20 DIAGNOSIS — M54.2 NECK PAIN, CHRONIC: ICD-10-CM

## 2025-08-20 DIAGNOSIS — M54.6 CHRONIC LEFT-SIDED THORACIC BACK PAIN: Primary | ICD-10-CM

## 2025-08-20 DIAGNOSIS — S06.0X0D CONCUSSION WITHOUT LOSS OF CONSCIOUSNESS, SUBSEQUENT ENCOUNTER: ICD-10-CM

## 2025-08-20 DIAGNOSIS — H83.2X3 VESTIBULAR HYPOFUNCTION OF BOTH EARS: ICD-10-CM

## 2025-08-20 DIAGNOSIS — G89.29 CHRONIC LEFT-SIDED THORACIC BACK PAIN: Primary | ICD-10-CM

## 2025-08-20 DIAGNOSIS — G89.29 NECK PAIN, CHRONIC: ICD-10-CM

## 2025-08-20 PROCEDURE — 97112 NEUROMUSCULAR REEDUCATION: CPT

## 2025-08-22 ENCOUNTER — OFFICE VISIT (OUTPATIENT)
Dept: PHYSICAL THERAPY | Facility: CLINIC | Age: 40
End: 2025-08-22
Payer: COMMERCIAL

## 2025-08-22 DIAGNOSIS — G89.29 NECK PAIN, CHRONIC: ICD-10-CM

## 2025-08-22 DIAGNOSIS — G89.29 CHRONIC LEFT-SIDED THORACIC BACK PAIN: Primary | ICD-10-CM

## 2025-08-22 DIAGNOSIS — M54.6 CHRONIC LEFT-SIDED THORACIC BACK PAIN: Primary | ICD-10-CM

## 2025-08-22 DIAGNOSIS — H83.2X3 VESTIBULAR HYPOFUNCTION OF BOTH EARS: ICD-10-CM

## 2025-08-22 DIAGNOSIS — S06.0X0D CONCUSSION WITHOUT LOSS OF CONSCIOUSNESS, SUBSEQUENT ENCOUNTER: ICD-10-CM

## 2025-08-22 DIAGNOSIS — M54.2 NECK PAIN, CHRONIC: ICD-10-CM

## 2025-08-22 PROCEDURE — 97140 MANUAL THERAPY 1/> REGIONS: CPT

## 2025-08-22 PROCEDURE — 97112 NEUROMUSCULAR REEDUCATION: CPT
